# Patient Record
Sex: MALE | Race: WHITE | NOT HISPANIC OR LATINO | URBAN - METROPOLITAN AREA
[De-identification: names, ages, dates, MRNs, and addresses within clinical notes are randomized per-mention and may not be internally consistent; named-entity substitution may affect disease eponyms.]

---

## 2017-03-28 ENCOUNTER — OFFICE (AMBULATORY)
Dept: URBAN - METROPOLITAN AREA CLINIC 64 | Facility: CLINIC | Age: 82
End: 2017-03-28

## 2017-03-28 VITALS
WEIGHT: 146 LBS | DIASTOLIC BLOOD PRESSURE: 71 MMHG | HEART RATE: 80 BPM | HEIGHT: 72 IN | SYSTOLIC BLOOD PRESSURE: 138 MMHG

## 2017-03-28 DIAGNOSIS — K21.9 GASTRO-ESOPHAGEAL REFLUX DISEASE WITHOUT ESOPHAGITIS: ICD-10-CM

## 2017-03-28 DIAGNOSIS — R63.4 ABNORMAL WEIGHT LOSS: ICD-10-CM

## 2017-03-28 DIAGNOSIS — R19.7 DIARRHEA, UNSPECIFIED: ICD-10-CM

## 2017-03-28 PROCEDURE — 99214 OFFICE O/P EST MOD 30 MIN: CPT

## 2017-03-31 ENCOUNTER — HOSPITAL ENCOUNTER (OUTPATIENT)
Dept: CT IMAGING | Facility: HOSPITAL | Age: 82
Discharge: HOME | End: 2017-03-31
Attending: INTERNAL MEDICINE | Admitting: INTERNAL MEDICINE

## 2017-03-31 LAB — CREAT BLDA-MCNC: 1.3 MG/DL (ref 0.6–1.3)

## 2017-04-26 ENCOUNTER — ON CAMPUS - OUTPATIENT (AMBULATORY)
Dept: URBAN - METROPOLITAN AREA HOSPITAL 2 | Facility: HOSPITAL | Age: 82
End: 2017-04-26
Payer: MEDICARE

## 2017-04-26 ENCOUNTER — OFFICE (AMBULATORY)
Dept: URBAN - METROPOLITAN AREA CLINIC 64 | Facility: CLINIC | Age: 82
End: 2017-04-26
Payer: MEDICARE

## 2017-04-26 ENCOUNTER — HOSPITAL ENCOUNTER (OUTPATIENT)
Dept: OTHER | Facility: HOSPITAL | Age: 82
Setting detail: SPECIMEN
Discharge: HOME | End: 2017-04-26
Attending: INTERNAL MEDICINE | Admitting: INTERNAL MEDICINE

## 2017-04-26 VITALS
TEMPERATURE: 97.6 F | RESPIRATION RATE: 16 BRPM | SYSTOLIC BLOOD PRESSURE: 81 MMHG | DIASTOLIC BLOOD PRESSURE: 49 MMHG | DIASTOLIC BLOOD PRESSURE: 45 MMHG | HEART RATE: 60 BPM | SYSTOLIC BLOOD PRESSURE: 86 MMHG | DIASTOLIC BLOOD PRESSURE: 70 MMHG | WEIGHT: 139 LBS | DIASTOLIC BLOOD PRESSURE: 103 MMHG | DIASTOLIC BLOOD PRESSURE: 48 MMHG | HEART RATE: 65 BPM | SYSTOLIC BLOOD PRESSURE: 112 MMHG | OXYGEN SATURATION: 100 % | HEART RATE: 62 BPM | HEIGHT: 72 IN | HEART RATE: 61 BPM | OXYGEN SATURATION: 98 % | DIASTOLIC BLOOD PRESSURE: 64 MMHG | HEART RATE: 63 BPM | SYSTOLIC BLOOD PRESSURE: 138 MMHG | OXYGEN SATURATION: 97 % | OXYGEN SATURATION: 99 % | SYSTOLIC BLOOD PRESSURE: 107 MMHG | DIASTOLIC BLOOD PRESSURE: 63 MMHG | SYSTOLIC BLOOD PRESSURE: 121 MMHG

## 2017-04-26 DIAGNOSIS — R19.7 DIARRHEA, UNSPECIFIED: ICD-10-CM

## 2017-04-26 DIAGNOSIS — K63.5 POLYP OF COLON: ICD-10-CM

## 2017-04-26 DIAGNOSIS — D12.5 BENIGN NEOPLASM OF SIGMOID COLON: ICD-10-CM

## 2017-04-26 DIAGNOSIS — K31.819 ANGIODYSPLASIA OF STOMACH AND DUODENUM WITHOUT BLEEDING: ICD-10-CM

## 2017-04-26 DIAGNOSIS — K21.9 GASTRO-ESOPHAGEAL REFLUX DISEASE WITHOUT ESOPHAGITIS: ICD-10-CM

## 2017-04-26 LAB
GI HISTOLOGY: A. UNSPECIFIED: (no result)
GI HISTOLOGY: PDF REPORT: (no result)

## 2017-04-26 PROCEDURE — 43235 EGD DIAGNOSTIC BRUSH WASH: CPT

## 2017-04-26 PROCEDURE — 88305 TISSUE EXAM BY PATHOLOGIST: CPT | Mod: 26

## 2017-04-26 PROCEDURE — 45385 COLONOSCOPY W/LESION REMOVAL: CPT

## 2017-04-26 RX ORDER — CYPROHEPTADINE HYDROCHLORIDE 4 MG/1
4 TABLET ORAL
Qty: 30 | Refills: 1 | Status: COMPLETED
Start: 2017-04-26 | End: 2020-07-22

## 2017-04-26 RX ADMIN — PROPOFOL: 10 INJECTION, EMULSION INTRAVENOUS at 14:44

## 2017-12-15 ENCOUNTER — HOSPITAL ENCOUNTER (OUTPATIENT)
Dept: ORTHOPEDIC SURGERY | Facility: CLINIC | Age: 82
Discharge: HOME | End: 2017-12-15
Attending: ORTHOPAEDIC SURGERY | Admitting: ORTHOPAEDIC SURGERY

## 2018-04-19 ENCOUNTER — HOSPITAL ENCOUNTER (OUTPATIENT)
Dept: ORTHOPEDIC SURGERY | Facility: CLINIC | Age: 83
Discharge: HOME | End: 2018-04-19
Attending: PHYSICIAN ASSISTANT | Admitting: PHYSICIAN ASSISTANT

## 2018-04-20 ENCOUNTER — HOSPITAL ENCOUNTER (OUTPATIENT)
Dept: HOME HEALTH SERVICES | Facility: HOME HEALTHCARE | Age: 83
Setting detail: SPECIMEN
Discharge: HOME | End: 2018-04-20
Attending: INTERNAL MEDICINE | Admitting: INTERNAL MEDICINE

## 2018-04-20 LAB
ALBUMIN SERPL-MCNC: 3.3 G/DL (ref 3.5–4.8)
ALBUMIN/GLOB SERPL: 1.2 {RATIO} (ref 1–1.7)
ALP SERPL-CCNC: 124 IU/L (ref 32–91)
ALT SERPL-CCNC: 31 IU/L (ref 17–63)
ANION GAP SERPL CALC-SCNC: 11.5 MMOL/L (ref 10–20)
AST SERPL-CCNC: 26 IU/L (ref 15–41)
BASOPHILS # BLD AUTO: 0 10*3/UL (ref 0–0.2)
BASOPHILS NFR BLD AUTO: 0 % (ref 0–2)
BILIRUB SERPL-MCNC: 0.5 MG/DL (ref 0.3–1.2)
BUN SERPL-MCNC: 29 MG/DL (ref 8–20)
BUN/CREAT SERPL: 22.3 (ref 6.2–20.3)
CALCIUM SERPL-MCNC: 9.3 MG/DL (ref 8.9–10.3)
CHLORIDE SERPL-SCNC: 103 MMOL/L (ref 101–111)
CONV CO2: 25 MMOL/L (ref 22–32)
CONV TOTAL PROTEIN: 6 G/DL (ref 6.1–7.9)
CREAT UR-MCNC: 1.3 MG/DL (ref 0.7–1.2)
DIFFERENTIAL METHOD BLD: (no result)
EOSINOPHIL # BLD AUTO: 0.2 10*3/UL (ref 0–0.3)
EOSINOPHIL # BLD AUTO: 2 % (ref 0–3)
ERYTHROCYTE [DISTWIDTH] IN BLOOD BY AUTOMATED COUNT: 17.4 % (ref 11.5–14.5)
GLOBULIN UR ELPH-MCNC: 2.7 G/DL (ref 2.5–3.8)
GLUCOSE SERPL-MCNC: 124 MG/DL (ref 65–99)
HCT VFR BLD AUTO: 30.7 % (ref 40–54)
HGB BLD-MCNC: 10.3 G/DL (ref 14–18)
LYMPHOCYTES # BLD AUTO: 1.2 10*3/UL (ref 0.8–4.8)
LYMPHOCYTES NFR BLD AUTO: 11 % (ref 18–42)
MCH RBC QN AUTO: 32.7 PG (ref 26–32)
MCHC RBC AUTO-ENTMCNC: 33.6 G/DL (ref 32–36)
MCV RBC AUTO: 97.4 FL (ref 80–94)
MONOCYTES # BLD AUTO: 0.9 10*3/UL (ref 0.1–1.3)
MONOCYTES NFR BLD AUTO: 9 % (ref 2–11)
NEUTROPHILS # BLD AUTO: 8.5 10*3/UL (ref 2.3–8.6)
NEUTROPHILS NFR BLD AUTO: 78 % (ref 50–75)
NRBC BLD AUTO-RTO: 0 /100{WBCS}
NRBC/RBC NFR BLD MANUAL: 0 10*3/UL
PLATELET # BLD AUTO: 570 10*3/UL (ref 150–450)
PMV BLD AUTO: 7.8 FL (ref 7.4–10.4)
POTASSIUM SERPL-SCNC: 4.5 MMOL/L (ref 3.6–5.1)
RBC # BLD AUTO: 3.15 10*6/UL (ref 4.6–6)
SODIUM SERPL-SCNC: 135 MMOL/L (ref 136–144)
WBC # BLD AUTO: 10.8 10*3/UL (ref 4.5–11.5)

## 2018-07-31 ENCOUNTER — HOSPITAL ENCOUNTER (OUTPATIENT)
Dept: LAB | Facility: HOSPITAL | Age: 83
Discharge: HOME | End: 2018-07-31
Attending: INTERNAL MEDICINE | Admitting: INTERNAL MEDICINE

## 2018-07-31 LAB
ALBUMIN SERPL-MCNC: 3.6 G/DL (ref 3.5–4.8)
ALBUMIN/GLOB SERPL: 1.4 {RATIO} (ref 1–1.7)
ALP SERPL-CCNC: 73 IU/L (ref 32–91)
ALT SERPL-CCNC: 18 IU/L (ref 17–63)
ANION GAP SERPL CALC-SCNC: 10.4 MMOL/L (ref 10–20)
APTT BLD: 28.8 SEC (ref 24–31)
AST SERPL-CCNC: 20 IU/L (ref 15–41)
BASOPHILS # BLD AUTO: 0 10*3/UL (ref 0–0.2)
BASOPHILS NFR BLD AUTO: 0 % (ref 0–2)
BILIRUB SERPL-MCNC: 0.5 MG/DL (ref 0.3–1.2)
BUN SERPL-MCNC: 16 MG/DL (ref 8–20)
BUN/CREAT SERPL: 12.3 (ref 6.2–20.3)
CALCIUM SERPL-MCNC: 9.3 MG/DL (ref 8.9–10.3)
CHLORIDE SERPL-SCNC: 107 MMOL/L (ref 101–111)
CONV CO2: 29 MMOL/L (ref 22–32)
CONV TOTAL PROTEIN: 6.2 G/DL (ref 6.1–7.9)
CREAT UR-MCNC: 1.3 MG/DL (ref 0.7–1.2)
DIFFERENTIAL METHOD BLD: (no result)
EOSINOPHIL # BLD AUTO: 0.2 10*3/UL (ref 0–0.3)
EOSINOPHIL # BLD AUTO: 3 % (ref 0–3)
ERYTHROCYTE [DISTWIDTH] IN BLOOD BY AUTOMATED COUNT: 15.1 % (ref 11.5–14.5)
GLOBULIN UR ELPH-MCNC: 2.6 G/DL (ref 2.5–3.8)
GLUCOSE SERPL-MCNC: 119 MG/DL (ref 65–99)
HCT VFR BLD AUTO: 32.5 % (ref 40–54)
HGB BLD-MCNC: 10.8 G/DL (ref 14–18)
INR PPP: 1.6 (ref 2–3)
LYMPHOCYTES # BLD AUTO: 1.4 10*3/UL (ref 0.8–4.8)
LYMPHOCYTES NFR BLD AUTO: 20 % (ref 18–42)
MAGNESIUM SERPL-MCNC: 1.9 MG/DL (ref 1.8–2.5)
MCH RBC QN AUTO: 34.1 PG (ref 26–32)
MCHC RBC AUTO-ENTMCNC: 33.1 G/DL (ref 32–36)
MCV RBC AUTO: 102.8 FL (ref 80–94)
MONOCYTES # BLD AUTO: 0.7 10*3/UL (ref 0.1–1.3)
MONOCYTES NFR BLD AUTO: 10 % (ref 2–11)
NEUTROPHILS # BLD AUTO: 4.7 10*3/UL (ref 2.3–8.6)
NEUTROPHILS NFR BLD AUTO: 67 % (ref 50–75)
NRBC BLD AUTO-RTO: 0 /100{WBCS}
NRBC/RBC NFR BLD MANUAL: 0 10*3/UL
PLATELET # BLD AUTO: 232 10*3/UL (ref 150–450)
PMV BLD AUTO: 7.8 FL (ref 7.4–10.4)
POTASSIUM SERPL-SCNC: 4.4 MMOL/L (ref 3.6–5.1)
PROTHROMBIN TIME: 16.7 SEC (ref 19.4–28.5)
RBC # BLD AUTO: 3.16 10*6/UL (ref 4.6–6)
SODIUM SERPL-SCNC: 142 MMOL/L (ref 136–144)
WBC # BLD AUTO: 7 10*3/UL (ref 4.5–11.5)

## 2019-07-19 ENCOUNTER — ANTICOAGULATION VISIT (OUTPATIENT)
Dept: CARDIOLOGY | Facility: CLINIC | Age: 84
End: 2019-07-19

## 2019-07-19 DIAGNOSIS — I48.91 ATRIAL FIBRILLATION, UNSPECIFIED TYPE (HCC): Primary | ICD-10-CM

## 2019-07-19 LAB — INR PPP: 2.2 (ref 0.9–1.1)

## 2019-07-19 PROCEDURE — 36416 COLLJ CAPILLARY BLOOD SPEC: CPT | Performed by: INTERNAL MEDICINE

## 2019-07-19 PROCEDURE — 85610 PROTHROMBIN TIME: CPT | Performed by: INTERNAL MEDICINE

## 2019-07-19 RX ORDER — CLOPIDOGREL BISULFATE 75 MG/1
TABLET ORAL
Qty: 90 TABLET | Refills: 3 | Status: SHIPPED | OUTPATIENT
Start: 2019-07-19 | End: 2022-04-20 | Stop reason: SDUPTHER

## 2019-08-16 ENCOUNTER — ANTICOAGULATION VISIT (OUTPATIENT)
Dept: CARDIOLOGY | Facility: CLINIC | Age: 84
End: 2019-08-16

## 2019-08-16 DIAGNOSIS — I48.91 ATRIAL FIBRILLATION, UNSPECIFIED TYPE (HCC): Primary | ICD-10-CM

## 2019-08-16 LAB — INR PPP: 3 (ref 0.9–1.1)

## 2019-08-16 PROCEDURE — 36416 COLLJ CAPILLARY BLOOD SPEC: CPT | Performed by: INTERNAL MEDICINE

## 2019-08-16 PROCEDURE — 85610 PROTHROMBIN TIME: CPT | Performed by: INTERNAL MEDICINE

## 2019-08-27 ENCOUNTER — CLINICAL SUPPORT NO REQUIREMENTS (OUTPATIENT)
Dept: CARDIOLOGY | Facility: CLINIC | Age: 84
End: 2019-08-27

## 2019-08-27 DIAGNOSIS — I49.5 SICK SINUS SYNDROME (HCC): Primary | ICD-10-CM

## 2019-08-27 DIAGNOSIS — Z95.0 PRESENCE OF CARDIAC PACEMAKER: ICD-10-CM

## 2019-08-27 PROBLEM — I48.92 ATRIAL FLUTTER: Status: ACTIVE | Noted: 2019-08-27

## 2019-08-27 PROCEDURE — 93294 REM INTERROG EVL PM/LDLS PM: CPT | Performed by: INTERNAL MEDICINE

## 2019-08-27 PROCEDURE — 93296 REM INTERROG EVL PM/IDS: CPT | Performed by: INTERNAL MEDICINE

## 2019-09-20 ENCOUNTER — ANTICOAGULATION VISIT (OUTPATIENT)
Dept: CARDIOLOGY | Facility: CLINIC | Age: 84
End: 2019-09-20

## 2019-09-20 DIAGNOSIS — I48.91 ATRIAL FIBRILLATION, UNSPECIFIED TYPE (HCC): Primary | ICD-10-CM

## 2019-09-20 LAB — INR PPP: 1.4 (ref 0.9–1.1)

## 2019-09-20 PROCEDURE — 36416 COLLJ CAPILLARY BLOOD SPEC: CPT | Performed by: INTERNAL MEDICINE

## 2019-09-20 PROCEDURE — 85610 PROTHROMBIN TIME: CPT | Performed by: INTERNAL MEDICINE

## 2019-10-11 ENCOUNTER — ANTICOAGULATION VISIT (OUTPATIENT)
Dept: CARDIOLOGY | Facility: CLINIC | Age: 84
End: 2019-10-11

## 2019-10-11 DIAGNOSIS — I48.91 ATRIAL FIBRILLATION, UNSPECIFIED TYPE (HCC): Primary | ICD-10-CM

## 2019-10-11 LAB — INR PPP: 2.9 (ref 0.9–1.1)

## 2019-10-11 PROCEDURE — 85610 PROTHROMBIN TIME: CPT | Performed by: INTERNAL MEDICINE

## 2019-10-11 PROCEDURE — 36416 COLLJ CAPILLARY BLOOD SPEC: CPT | Performed by: INTERNAL MEDICINE

## 2019-11-19 ENCOUNTER — OFFICE VISIT (OUTPATIENT)
Dept: CARDIOLOGY | Facility: CLINIC | Age: 84
End: 2019-11-19

## 2019-11-19 ENCOUNTER — ANTICOAGULATION VISIT (OUTPATIENT)
Dept: CARDIOLOGY | Facility: CLINIC | Age: 84
End: 2019-11-19

## 2019-11-19 VITALS
BODY MASS INDEX: 20.02 KG/M2 | WEIGHT: 147.8 LBS | DIASTOLIC BLOOD PRESSURE: 77 MMHG | HEART RATE: 83 BPM | OXYGEN SATURATION: 98 % | SYSTOLIC BLOOD PRESSURE: 169 MMHG | HEIGHT: 72 IN

## 2019-11-19 DIAGNOSIS — I48.91 ATRIAL FIBRILLATION, UNSPECIFIED TYPE (HCC): Primary | ICD-10-CM

## 2019-11-19 DIAGNOSIS — I25.810 CORONARY ARTERY DISEASE INVOLVING CORONARY BYPASS GRAFT OF NATIVE HEART WITHOUT ANGINA PECTORIS: Primary | ICD-10-CM

## 2019-11-19 DIAGNOSIS — I49.5 SICK SINUS SYNDROME (HCC): ICD-10-CM

## 2019-11-19 DIAGNOSIS — R00.2 PALPITATIONS: ICD-10-CM

## 2019-11-19 DIAGNOSIS — Z95.0 PRESENCE OF CARDIAC PACEMAKER: ICD-10-CM

## 2019-11-19 DIAGNOSIS — I48.0 PAROXYSMAL ATRIAL FIBRILLATION (HCC): ICD-10-CM

## 2019-11-19 DIAGNOSIS — I10 ESSENTIAL HYPERTENSION: ICD-10-CM

## 2019-11-19 LAB — INR PPP: 2.5 (ref 0.9–1.1)

## 2019-11-19 PROCEDURE — 36416 COLLJ CAPILLARY BLOOD SPEC: CPT | Performed by: INTERNAL MEDICINE

## 2019-11-19 PROCEDURE — 93000 ELECTROCARDIOGRAM COMPLETE: CPT | Performed by: INTERNAL MEDICINE

## 2019-11-19 PROCEDURE — 85610 PROTHROMBIN TIME: CPT | Performed by: INTERNAL MEDICINE

## 2019-11-19 RX ORDER — WARFARIN SODIUM 5 MG/1
5 TABLET ORAL
COMMUNITY
End: 2019-12-03 | Stop reason: SDUPTHER

## 2019-11-19 RX ORDER — NITROGLYCERIN 0.4 MG/1
TABLET SUBLINGUAL
Status: ON HOLD | COMMUNITY
Start: 2015-10-01 | End: 2021-11-30

## 2019-11-19 RX ORDER — ROSUVASTATIN CALCIUM 20 MG/1
20 TABLET, COATED ORAL NIGHTLY
COMMUNITY

## 2019-11-19 RX ORDER — ASCORBIC ACID 500 MG
TABLET ORAL
Status: ON HOLD | COMMUNITY
Start: 2012-05-14 | End: 2021-11-30

## 2019-11-19 RX ORDER — FAMOTIDINE 20 MG/1
TABLET, FILM COATED ORAL EVERY 24 HOURS
COMMUNITY
Start: 2018-06-29 | End: 2020-04-20

## 2019-11-19 RX ORDER — FLUTICASONE PROPIONATE 50 MCG
SPRAY, SUSPENSION (ML) NASAL
COMMUNITY
Start: 2018-03-02 | End: 2020-04-20

## 2019-11-19 RX ORDER — PANTOPRAZOLE SODIUM 40 MG/1
40 TABLET, DELAYED RELEASE ORAL NIGHTLY
COMMUNITY
Start: 2013-09-06

## 2019-11-19 NOTE — PROGRESS NOTES
"Visit Type:  Follow-up Visit  Primary Care Provider:  Dr. Cano     Chief Complaint:  Last OV /Dr. Islas Dec. 2017: .     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        Mr. Junior Ganga mccartney is a very pleasant 84 years old anxious gentleman with history of chronic ischemic heart disease status post previous CABG and subsequent PCI stenting.  He has had  history of atrial fibrillation  and  is on long-term anticoagulation   therapy with warfarin.       Patient underwent AF ablation and  has been feeling much better without any significant symptoms-he does have easy bruisability and takes clopidogrel and warfarin.   Patient had prior AV roc reentrant tachycardia ablation.  He is intolerant to numerous medications including beta-blocker therapy.  He is currently on warfarin to prevent cardioembolic episodes.   chads Vasc score--4.  He is status post permanent pacemaker implant and will get his pacemaker checked in a couple of weeks.    He offers no complaints of chest pain dyspnea or palpitations..  /77   Pulse 83 Comment: Sinus  Ht 182.9 cm (72\")   Wt 67 kg (147 lb 12.8 oz)   SpO2 98%   BMI 20.05 kg/m²        Blood pressure recheck showed that it did come down very nicely and was four 142/72.    Indication for EKG: Paroxysmal atrial fibrillation, sick sinus syndrome, history of PSVT    Normal sinus rhythm with a rate of 83 bpm.  Early transition.  LVH.  Poor R wave progression V2 to V3 probably due to lead misplacement.  EKG is essentially unchanged as compared to previous EKG of 3/24/2019.  IL interval 162 ms QRS duration 93 ms  ms and QRS axis 56    Assessment/plan:  1- CAD,   Status post CABG.  Status post PCI stenting.  No anginal symptoms.      2- recurrent " and  paroxysmal atrial fibrillation -- post ablation doing well very rare palpitations short-lived.      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     Thanks very much for allowing us to participate in the care of your patients          Problems: Active problems were reviewed with the patient during this visit.  Medications: Medications were reviewed with the patient during this visit.  Allergies: Allergies were reviewed with the patient during this visit.           Past Medical History:     Reviewed history from 01/26/2018 and no changes required:        Coronary artery disease: S/P CABG and PCI         Hyperlipidemia        Myocardial infarction, anterior        Hypertension        Gastroesophageal reflux disease        Dubose's esophagus        Diverticulitis        Atrial Fibrillation: new onset 12-4-15        SSS, Toby, Tachy : Pacemaker implantation / [employer]         Atrial Fibrillation, paroxysmal         pneumonia        AVNRT ablation--1/2018     Past Surgical History:     Reviewed history from 04/19/2018 and no changes required:        CABG x 4, 8-15-01        PCI/stent, LCX,  2-25-07        Hernia repair        Left rotator cuff repair        9 inches of colon removed        Colonoscopy        Cardiac Cath: 2001:2003;  2007: 2010:         [facility] 12-15-15 / unsuccessful attempt PCI of obtuse marginal branch of circumflex. Represents failed 2nd attempt of PCI of this vessel.         Herniorrhaphy Left Side        [facility] : 8-5-16- [employer] Pacemaker Implantation        SVT ablation 1/2018 [facility]        Left ATHR 4/4/18 Dr. Sarah BOCANEGRA: prostate 3-31-19     Active Medications (reviewed today):  EQ ACID REDUCER MAX ST 20 MG ORAL TABLET  "(FAMOTIDINE) 1 tab daily.  FLOMAX 0.4 MG ORAL CAPSULE (TAMSULOSIN HCL) 1 tablet daily as needed.  FLUTICASONE PROPIONATE 50 MCG/ACT NASAL SUSPENSION (FLUTICASONE PROPIONATE)   WARFARIN SODIUM 5 MG TABLET (WARFARIN SODIUM) Take one tablet daily or as directed  NITROSTAT 0.4 MG SUBLINGUAL TABLET SUBLINGUAL (NITROGLYCERIN) use as directed PRN  VITAMIN D-3 5000 UNIT ORAL TABS (CHOLECALCIFEROL) Take 1 tablet by mouth daily  CLOPIDOGREL 75 MG TABLET (CLOPIDOGREL BISULFATE) TAKE 1 TABLET EVERY DAY  VITAMIN C 500 MG ORAL TABLET (ASCORBIC ACID) Take one by mouth daily  PANTOPRAZOLE SODIUM 40 MG ORAL TABLET DELAYED RELEASE (PANTOPRAZOLE SODIUM) p.o.  qd  CRESTOR 20 MG ORAL TABLET (ROSUVASTATIN CALCIUM) Take 1/2  tablet by mouth daily at bedtime     Current Allergies (reviewed today):  * VALSARTAN (Critical)  CODEINE (Moderate)  LIPITOR (Moderate)     Family History Summary:      Reviewed history Last on 01/26/2018 and no changes required:05/10/2019        General Comments - FH:  Noncontributory for heart disease  Father had \"leaky heart valve\" , but lived to be age 81.   FH Other Cancer: Mother had stomach cancer         Social History:     Reviewed history from 06/17/2014 and no changes required:        Retired                Children 2 boys           Risk Factors:      Smoked Tobacco Use:  Current every day smoker     Pipes:  Yes  Passive smoke exposure:  no  Drug use:  no  HIV high-risk behavior:  no  Caffeine use:  2 drinks per day  Alcohol use:  no  Exercise:  no  Seatbelt use:  100 %  Sun Exposure:  occasionally     Family History Risk Factors:     Family History of MI in females < 65 years old:  no     Family History of MI in males < 55 years old:  no           Review of Systems   General: denies fevers, chills, sweats, anorexia, fatigue, malaise, weight loss  Eyes: denies blurring, diplopia, irritation, discharge, vision loss, eye pain, photophobia  Cardiovascular:  history of coronary artery disease previous " CABG status post PCI stenting.  Hypertension.  Cardiac dysrhythmias atrial fibrillation and supraventricular tachycardia status post ablation for both arrhythmia is.  Permanent pacemaker implant  Respiratory: Denies cough, dyspnea, excessive sputum, hemoptysis, wheezing  Genitourinary:  history of prostate problems  Musculoskeletal: denies back pain, joint pain, joint swelling, muscle cramps, muscle weakness, stiffness, arthritis  Skin: denies rash, itching, dryness, suspicious lesions  Neurologic: denies transient paralysis, weakness, paresthesias, seizures, syncope, tremors, vertigo  Psychiatric: denies depression, anxiety, memory loss, mental disturbance, suicidal ideation, hallucinations, paranoia        Physical Exam     General:      well developed, well nourished, in no acute distress.    Neck:      no masses, thyromegaly, or abnormal cervical nodes.   no JVD. No carotid bruit  Lungs:      clear bilaterally to auscultation.    Heart:      non-displaced PMI, chest non-tender; regular rate and rhythm, S1, S2 without murmurs, rubs, or gallops  Pulses:      pulses normal in all 4 extremities.    Extremities:       no edema              Home

## 2019-12-02 ENCOUNTER — OFFICE VISIT (OUTPATIENT)
Dept: CARDIOLOGY | Facility: CLINIC | Age: 84
End: 2019-12-02

## 2019-12-02 ENCOUNTER — CLINICAL SUPPORT NO REQUIREMENTS (OUTPATIENT)
Dept: CARDIOLOGY | Facility: CLINIC | Age: 84
End: 2019-12-02

## 2019-12-02 VITALS
SYSTOLIC BLOOD PRESSURE: 150 MMHG | BODY MASS INDEX: 20.07 KG/M2 | WEIGHT: 148 LBS | DIASTOLIC BLOOD PRESSURE: 69 MMHG | HEART RATE: 76 BPM

## 2019-12-02 DIAGNOSIS — I49.5 SICK SINUS SYNDROME (HCC): Primary | ICD-10-CM

## 2019-12-02 DIAGNOSIS — I48.0 PAROXYSMAL ATRIAL FIBRILLATION (HCC): ICD-10-CM

## 2019-12-02 DIAGNOSIS — I49.5 SICK SINUS SYNDROME (HCC): ICD-10-CM

## 2019-12-02 DIAGNOSIS — I10 ESSENTIAL HYPERTENSION: ICD-10-CM

## 2019-12-02 DIAGNOSIS — Z95.0 PRESENCE OF CARDIAC PACEMAKER: Primary | ICD-10-CM

## 2019-12-02 PROCEDURE — 99213 OFFICE O/P EST LOW 20 MIN: CPT | Performed by: INTERNAL MEDICINE

## 2019-12-02 NOTE — PROGRESS NOTES
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 Junior Schuler  is a very pleasant 82 years old anxious gentleman with history of chronic ischemic heart disease status post previous CABG and subsequent PCI stenting.  He has had  history of atrial fibrillation .  He is on long-term anticoagulation therapy as well.  He is currently taking warfarin to prevent cardioembolic episodes.   patient continues to have rapid intermittent atrial arrhythmias associated with fatigue and tiredness and patient is intolerant to sotalol and started on Amiodarone which is unable to tolerate--Patient underwent AF ablation few months ago and comes in for follow-up and  is feeling much better without any significant symptoms--Patient has noticed diffuse subcutaneous bleeding with ongoing intake of warfarin    patient had prior AV roc reentrant tachycardia ablation   chads Vasc score--4  HASBLED--3     assessment plan    recurrent and  paroxysmal atrial fibrillation -- post ablation doing well  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-- post ablation with complete resolution of symptoms   dual-chamber pacemaker in situ with normal function--no atrial fibrillation on interrogation of his pacemaker   coronary artery disease    hypertension  Medications reviewed and follow-up after few months and appropriate pacemaker function noted        Vital Signs: Blood pressure 150/69 pulse rate is 76 patient is afebrile respiration 12 times a minute    Current Allergies (reviewed today):  * VALSARTAN (Critical)  CODEINE (Moderate)  LIPITOR (Moderate)      Past Medical History:     Reviewed history from 01/26/2018 and no changes required:        Coronary artery disease: S/P CABG and PCI         Hyperlipidemia        Myocardial infarction, anterior         Hypertension        Gastroesophageal reflux disease        Dubose's esophagus        Diverticulitis        Atrial Fibrillation: new onset 12-4-15        SSS, Toby, Tachy : Pacemaker implantation / [employer]         Atrial Fibrillation, paroxysmal         pneumonia        AVNRT ablation--1/2018    Past Surgical History:     Reviewed history from 04/19/2018 and no changes required:        CABG x 4, 8-15-01        PCI/stent, LCX,  2-25-07        Hernia repair        Left rotator cuff repair        9 inches of colon removed        Colonoscopy        Cardiac Cath: 2001:2003;  2007: 2010:         [facility] 12-15-15 / unsuccessful attempt PCI of obtuse marginal branch of circumflex. Represents failed 2nd attempt of PCI of this vessel.         Herniorrhaphy Left Side        [facility] : 8-5-16- [employer] Pacemaker Implantation        SVT ablation 1/2018 [facility]        Left ATHR 4/4/18 Dr. Smith        Review of Systems   General: No fatigue or tiredness, No change in weight   Eyes: No redness  Cardiovascular: No chest pain, no palpitations  Respiratory: No shortness of breath  Gastrointestinal: No nausea or vomiting, bleeding  Genitourinary: no hematuria or dysuria  Musculoskeletal: No arthralgia or myalgia  Skin: No rash  Neurologic: No numbness, tingling, syncope  Hematologic/Lymphatic:  positive for subcutaneous bleeding      Physical Exam    General:      well developed, well nourished, in no acute distress.    Head:      normocephalic and atraumatic.    Eyes:      PERRL/EOM intact, conjunctiva and sclera clear with out nystagmus.    Neck:      no masses, thyromegaly   trachea central with normal respiratory effort and PMI is nondisplaced  Lungs:      clear bilaterally to auscultation.    Heart:      non-displaced PMI, chest non-tender; regular rate and rhythm, S1, S2 without murmurs, rubs, or gallops  Skin:       positive for diffuse subcutaneous bleeding  Psych:      alert and cooperative; normal mood and affect;  normal attention span and concentration.

## 2019-12-03 PROCEDURE — 93280 PM DEVICE PROGR EVAL DUAL: CPT | Performed by: INTERNAL MEDICINE

## 2019-12-03 RX ORDER — WARFARIN SODIUM 5 MG/1
TABLET ORAL
Qty: 90 TABLET | Refills: 0 | Status: SHIPPED | OUTPATIENT
Start: 2019-12-03 | End: 2020-01-31 | Stop reason: SDUPTHER

## 2019-12-17 ENCOUNTER — ANTICOAGULATION VISIT (OUTPATIENT)
Dept: CARDIOLOGY | Facility: CLINIC | Age: 84
End: 2019-12-17

## 2019-12-17 DIAGNOSIS — I48.91 ATRIAL FIBRILLATION, UNSPECIFIED TYPE (HCC): Primary | ICD-10-CM

## 2019-12-17 LAB — INR PPP: 3.2 (ref 0.9–1.1)

## 2019-12-17 PROCEDURE — 36416 COLLJ CAPILLARY BLOOD SPEC: CPT | Performed by: INTERNAL MEDICINE

## 2019-12-17 PROCEDURE — 85610 PROTHROMBIN TIME: CPT | Performed by: INTERNAL MEDICINE

## 2020-01-07 ENCOUNTER — ANTICOAGULATION VISIT (OUTPATIENT)
Dept: CARDIOLOGY | Facility: CLINIC | Age: 85
End: 2020-01-07

## 2020-01-07 DIAGNOSIS — I48.91 ATRIAL FIBRILLATION, UNSPECIFIED TYPE (HCC): Primary | ICD-10-CM

## 2020-01-07 LAB — INR PPP: 2.9 (ref 0.9–1.1)

## 2020-01-07 PROCEDURE — 36416 COLLJ CAPILLARY BLOOD SPEC: CPT | Performed by: INTERNAL MEDICINE

## 2020-01-07 PROCEDURE — 85610 PROTHROMBIN TIME: CPT | Performed by: INTERNAL MEDICINE

## 2020-01-28 ENCOUNTER — ANTICOAGULATION VISIT (OUTPATIENT)
Dept: CARDIOLOGY | Facility: CLINIC | Age: 85
End: 2020-01-28

## 2020-01-28 DIAGNOSIS — I48.0 PAROXYSMAL ATRIAL FIBRILLATION (HCC): Primary | ICD-10-CM

## 2020-01-28 LAB — INR PPP: 1.6 (ref 0.9–1.1)

## 2020-01-28 PROCEDURE — 36416 COLLJ CAPILLARY BLOOD SPEC: CPT | Performed by: INTERNAL MEDICINE

## 2020-01-28 PROCEDURE — 85610 PROTHROMBIN TIME: CPT | Performed by: INTERNAL MEDICINE

## 2020-01-31 RX ORDER — WARFARIN SODIUM 5 MG/1
5 TABLET ORAL DAILY
Qty: 90 TABLET | Refills: 1 | Status: SHIPPED | OUTPATIENT
Start: 2020-01-31 | End: 2020-02-11 | Stop reason: SDUPTHER

## 2020-02-11 RX ORDER — WARFARIN SODIUM 5 MG/1
TABLET ORAL
Qty: 90 TABLET | Refills: 1 | Status: SHIPPED | OUTPATIENT
Start: 2020-02-11 | End: 2020-10-19

## 2020-02-25 ENCOUNTER — ANTICOAGULATION VISIT (OUTPATIENT)
Dept: CARDIOLOGY | Facility: CLINIC | Age: 85
End: 2020-02-25

## 2020-02-25 DIAGNOSIS — I48.0 PAROXYSMAL ATRIAL FIBRILLATION (HCC): Primary | ICD-10-CM

## 2020-02-25 LAB — INR PPP: 2.1 (ref 0.9–1.1)

## 2020-02-25 PROCEDURE — 36416 COLLJ CAPILLARY BLOOD SPEC: CPT | Performed by: INTERNAL MEDICINE

## 2020-02-25 PROCEDURE — 85610 PROTHROMBIN TIME: CPT | Performed by: INTERNAL MEDICINE

## 2020-03-06 ENCOUNTER — CLINICAL SUPPORT NO REQUIREMENTS (OUTPATIENT)
Dept: CARDIOLOGY | Facility: CLINIC | Age: 85
End: 2020-03-06

## 2020-03-06 DIAGNOSIS — I49.5 SICK SINUS SYNDROME (HCC): Primary | ICD-10-CM

## 2020-03-06 PROCEDURE — 93294 REM INTERROG EVL PM/LDLS PM: CPT | Performed by: INTERNAL MEDICINE

## 2020-03-06 PROCEDURE — 93296 REM INTERROG EVL PM/IDS: CPT | Performed by: INTERNAL MEDICINE

## 2020-03-17 ENCOUNTER — ANTICOAGULATION VISIT (OUTPATIENT)
Dept: CARDIOLOGY | Facility: CLINIC | Age: 85
End: 2020-03-17

## 2020-03-17 DIAGNOSIS — I48.0 PAROXYSMAL ATRIAL FIBRILLATION (HCC): Primary | ICD-10-CM

## 2020-03-17 LAB — INR PPP: 1.8 (ref 0.9–1.1)

## 2020-03-17 PROCEDURE — 36416 COLLJ CAPILLARY BLOOD SPEC: CPT | Performed by: INTERNAL MEDICINE

## 2020-03-17 PROCEDURE — 85610 PROTHROMBIN TIME: CPT | Performed by: INTERNAL MEDICINE

## 2020-04-07 ENCOUNTER — ANTICOAGULATION VISIT (OUTPATIENT)
Dept: CARDIOLOGY | Facility: CLINIC | Age: 85
End: 2020-04-07

## 2020-04-07 DIAGNOSIS — I48.0 PAROXYSMAL ATRIAL FIBRILLATION (HCC): Primary | ICD-10-CM

## 2020-04-07 LAB — INR PPP: 2 (ref 0.9–1.1)

## 2020-04-07 PROCEDURE — 36416 COLLJ CAPILLARY BLOOD SPEC: CPT | Performed by: INTERNAL MEDICINE

## 2020-04-07 PROCEDURE — 85610 PROTHROMBIN TIME: CPT | Performed by: INTERNAL MEDICINE

## 2020-04-07 NOTE — PATIENT INSTRUCTIONS
Increase to schedule listed.  Patient thinks he only increased the first week & then continued 5 mg daily.

## 2020-04-20 ENCOUNTER — OFFICE VISIT (OUTPATIENT)
Dept: CARDIOLOGY | Facility: CLINIC | Age: 85
End: 2020-04-20

## 2020-04-20 VITALS
HEART RATE: 76 BPM | WEIGHT: 148 LBS | SYSTOLIC BLOOD PRESSURE: 146 MMHG | DIASTOLIC BLOOD PRESSURE: 63 MMHG | BODY MASS INDEX: 20.05 KG/M2 | RESPIRATION RATE: 18 BRPM | HEIGHT: 72 IN | OXYGEN SATURATION: 100 %

## 2020-04-20 DIAGNOSIS — I48.92 ATRIAL FLUTTER, UNSPECIFIED TYPE (HCC): ICD-10-CM

## 2020-04-20 DIAGNOSIS — I10 ESSENTIAL HYPERTENSION: ICD-10-CM

## 2020-04-20 DIAGNOSIS — Z95.0 PRESENCE OF CARDIAC PACEMAKER: Primary | ICD-10-CM

## 2020-04-20 DIAGNOSIS — E78.2 MIXED HYPERLIPIDEMIA: ICD-10-CM

## 2020-04-20 DIAGNOSIS — I49.5 SICK SINUS SYNDROME (HCC): ICD-10-CM

## 2020-04-20 PROCEDURE — 99214 OFFICE O/P EST MOD 30 MIN: CPT | Performed by: INTERNAL MEDICINE

## 2020-04-28 ENCOUNTER — ANTICOAGULATION VISIT (OUTPATIENT)
Dept: CARDIOLOGY | Facility: CLINIC | Age: 85
End: 2020-04-28

## 2020-04-28 DIAGNOSIS — I48.0 PAROXYSMAL ATRIAL FIBRILLATION (HCC): Primary | ICD-10-CM

## 2020-04-28 LAB — INR PPP: 2.5 (ref 0.9–1.1)

## 2020-04-28 PROCEDURE — 36416 COLLJ CAPILLARY BLOOD SPEC: CPT | Performed by: INTERNAL MEDICINE

## 2020-04-28 PROCEDURE — 85610 PROTHROMBIN TIME: CPT | Performed by: INTERNAL MEDICINE

## 2020-05-29 ENCOUNTER — ANTICOAGULATION VISIT (OUTPATIENT)
Dept: CARDIOLOGY | Facility: CLINIC | Age: 85
End: 2020-05-29

## 2020-05-29 DIAGNOSIS — I48.0 PAROXYSMAL ATRIAL FIBRILLATION (HCC): Primary | ICD-10-CM

## 2020-05-29 LAB — INR PPP: 1.9 (ref 0.9–1.1)

## 2020-05-29 PROCEDURE — 36416 COLLJ CAPILLARY BLOOD SPEC: CPT | Performed by: INTERNAL MEDICINE

## 2020-05-29 PROCEDURE — 85610 PROTHROMBIN TIME: CPT | Performed by: INTERNAL MEDICINE

## 2020-06-05 ENCOUNTER — CLINICAL SUPPORT NO REQUIREMENTS (OUTPATIENT)
Dept: CARDIOLOGY | Facility: CLINIC | Age: 85
End: 2020-06-05

## 2020-06-05 ENCOUNTER — ANTICOAGULATION VISIT (OUTPATIENT)
Dept: CARDIOLOGY | Facility: CLINIC | Age: 85
End: 2020-06-05

## 2020-06-05 ENCOUNTER — OFFICE VISIT (OUTPATIENT)
Dept: CARDIOLOGY | Facility: CLINIC | Age: 85
End: 2020-06-05

## 2020-06-05 VITALS
SYSTOLIC BLOOD PRESSURE: 141 MMHG | BODY MASS INDEX: 19.96 KG/M2 | HEART RATE: 75 BPM | OXYGEN SATURATION: 93 % | DIASTOLIC BLOOD PRESSURE: 73 MMHG | WEIGHT: 147.2 LBS

## 2020-06-05 DIAGNOSIS — I48.0 PAROXYSMAL ATRIAL FIBRILLATION (HCC): Primary | ICD-10-CM

## 2020-06-05 DIAGNOSIS — I48.92 ATRIAL FLUTTER, UNSPECIFIED TYPE (HCC): ICD-10-CM

## 2020-06-05 DIAGNOSIS — I49.5 SICK SINUS SYNDROME (HCC): Primary | ICD-10-CM

## 2020-06-05 DIAGNOSIS — I49.5 SICK SINUS SYNDROME (HCC): ICD-10-CM

## 2020-06-05 DIAGNOSIS — Z95.0 PRESENCE OF CARDIAC PACEMAKER: ICD-10-CM

## 2020-06-05 DIAGNOSIS — I10 ESSENTIAL HYPERTENSION: ICD-10-CM

## 2020-06-05 LAB — INR PPP: 2.1 (ref 0.9–1.1)

## 2020-06-05 PROCEDURE — 99214 OFFICE O/P EST MOD 30 MIN: CPT | Performed by: INTERNAL MEDICINE

## 2020-06-05 PROCEDURE — 93000 ELECTROCARDIOGRAM COMPLETE: CPT | Performed by: INTERNAL MEDICINE

## 2020-06-05 PROCEDURE — 36416 COLLJ CAPILLARY BLOOD SPEC: CPT | Performed by: INTERNAL MEDICINE

## 2020-06-05 PROCEDURE — 93294 REM INTERROG EVL PM/LDLS PM: CPT | Performed by: INTERNAL MEDICINE

## 2020-06-05 PROCEDURE — 93280 PM DEVICE PROGR EVAL DUAL: CPT | Performed by: INTERNAL MEDICINE

## 2020-06-05 PROCEDURE — 93296 REM INTERROG EVL PM/IDS: CPT | Performed by: INTERNAL MEDICINE

## 2020-06-05 PROCEDURE — 85610 PROTHROMBIN TIME: CPT | Performed by: INTERNAL MEDICINE

## 2020-06-05 NOTE — PROGRESS NOTES
In clinic device interrogation with Dr. Luque. See scanned report. No new episodes. No changes made.

## 2020-06-05 NOTE — PROGRESS NOTES
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 Junior Schuler  is a very pleasant 84 years old anxious gentleman with history of chronic ischemic heart disease status post previous CABG and subsequent PCI stenting.  He has had  history of atrial fibrillation .  He is on long-term anticoagulation therapy as well.  He is currently taking warfarin to prevent cardioembolic episodes.   patient continues to have rapid intermittent atrial arrhythmias associated with fatigue and tiredness and patient is intolerant to sotalol and started on Amiodarone which is unable to tolerate--Patient underwent AF ablation  months ago and comes in for follow-up and  is feeling much better without any significant symptoms--Patient has noticed diffuse subcutaneous bleeding with ongoing intake of warfarin    patient had prior AV roc reentrant tachycardia ablation   chads Vasc score--4  HASBLED--3  Patient is doing remarkably well and denies any chest pain or dyspnea or syncope  Occasionally notices that his blood pressure systolic is elevated in the evening time and most of the time the diastolic pressures are around 60  No syncope or palpitations since AF ablation     assessment plan    recurrent and  paroxysmal atrial fibrillation -- post ablation doing well--- no significant A. 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-- post ablation with complete resolution of symptoms   dual-chamber pacemaker in situ with normal function   coronary artery disease    hypertension  Medications reviewed and follow-up after 6 months and appropriate pacemaker function noted        Vital Signs: Blood pressure 140/73 pulse rate is 75 patient is afebrile respiration  12 times a minute  EKG shows sinus rhythm with PACs and nonspecific ST-T wave changes early transition in V2 heart rate of 77 QRS of 91 QT of 406 and no significant EKG changes compared to prior EKG and EKG indication includes atrial arrhythmias and sick sinus syndrome      Past Medical History:     Reviewed history from 01/26/2018 and no changes required:        Coronary artery disease: S/P CABG and PCI         Hyperlipidemia        Myocardial infarction, anterior        Hypertension        Gastroesophageal reflux disease        Dubose's esophagus        Diverticulitis        Atrial Fibrillation: new onset 12-4-15        SSS, Toby, Tachy : Pacemaker implantation / [employer]         Atrial Fibrillation, paroxysmal         pneumonia        AVNRT ablation--1/2018    Past Surgical History:     Reviewed history from 04/19/2018 and no changes required:        CABG x 4, 8-15-01        PCI/stent, LCX,  2-25-07        Hernia repair        Left rotator cuff repair        9 inches of colon removed        Colonoscopy        Cardiac Cath: 2001:2003;  2007: 2010:         [facility] 12-15-15 / unsuccessful attempt PCI of obtuse marginal branch of circumflex. Represents failed 2nd attempt of PCI of this vessel.         Herniorrhaphy Left Side        [facility] : 8-5-16- [employer] Pacemaker Implantation        SVT ablation 1/2018 [facility]        Left ATHR 4/4/18 Dr. Smith        Review of Systems   General: No fatigue or tiredness, No change in weight   Eyes: No redness  Cardiovascular: No chest pain, no palpitations  Respiratory: No shortness of breath  Gastrointestinal: No nausea or vomiting, bleeding  Genitourinary: no hematuria or dysuria  Musculoskeletal: No arthralgia or myalgia  Skin: No rash  Neurologic: No numbness, tingling, syncope  Hematologic/Lymphatic:  positive for subcutaneous bleeding      Physical Exam    General:      well developed, well nourished, in no acute distress.    Head:      normocephalic and atraumatic.     Eyes:      PERRL/EOM intact, conjunctiva and sclera clear with out nystagmus.    Neck:      no masses, thyromegaly   trachea central with normal respiratory effort and PMI is nondisplaced  Lungs:      clear bilaterally to auscultation.    Heart:      non-displaced PMI, chest non-tender; regular rate and rhythm, S1, S2 without murmurs, rubs, or gallops  Skin:       positive for diffuse subcutaneous bleeding  Psych:      alert and cooperative; normal mood and affect; normal attention span and concentration.      Electronically signed by Froilan Luque MD, 06/05/20, 9:25 AM.

## 2020-06-30 ENCOUNTER — ANTICOAGULATION VISIT (OUTPATIENT)
Dept: CARDIOLOGY | Facility: CLINIC | Age: 85
End: 2020-06-30

## 2020-06-30 DIAGNOSIS — I48.0 PAROXYSMAL ATRIAL FIBRILLATION (HCC): Primary | ICD-10-CM

## 2020-06-30 LAB — INR PPP: 2.5 (ref 0.9–1.1)

## 2020-06-30 PROCEDURE — 36416 COLLJ CAPILLARY BLOOD SPEC: CPT | Performed by: INTERNAL MEDICINE

## 2020-06-30 PROCEDURE — 85610 PROTHROMBIN TIME: CPT | Performed by: INTERNAL MEDICINE

## 2020-07-22 ENCOUNTER — OFFICE (AMBULATORY)
Dept: URBAN - METROPOLITAN AREA CLINIC 64 | Facility: CLINIC | Age: 85
End: 2020-07-22

## 2020-07-22 VITALS
HEIGHT: 72 IN | WEIGHT: 144 LBS | DIASTOLIC BLOOD PRESSURE: 57 MMHG | SYSTOLIC BLOOD PRESSURE: 122 MMHG | HEART RATE: 74 BPM

## 2020-07-22 DIAGNOSIS — K30 FUNCTIONAL DYSPEPSIA: ICD-10-CM

## 2020-07-22 DIAGNOSIS — R11.0 NAUSEA: ICD-10-CM

## 2020-07-22 DIAGNOSIS — R63.0 ANOREXIA: ICD-10-CM

## 2020-07-22 DIAGNOSIS — K21.9 GASTRO-ESOPHAGEAL REFLUX DISEASE WITHOUT ESOPHAGITIS: ICD-10-CM

## 2020-07-22 DIAGNOSIS — R63.4 ABNORMAL WEIGHT LOSS: ICD-10-CM

## 2020-07-22 PROCEDURE — 99204 OFFICE O/P NEW MOD 45 MIN: CPT | Performed by: INTERNAL MEDICINE

## 2020-07-22 RX ORDER — FAMOTIDINE 40 MG/1
40 TABLET, FILM COATED ORAL
Qty: 30 | Refills: 11 | Status: ACTIVE
Start: 2020-07-22

## 2020-07-28 ENCOUNTER — ANTICOAGULATION VISIT (OUTPATIENT)
Dept: CARDIOLOGY | Facility: CLINIC | Age: 85
End: 2020-07-28

## 2020-07-28 DIAGNOSIS — I48.0 PAROXYSMAL ATRIAL FIBRILLATION (HCC): Primary | ICD-10-CM

## 2020-07-28 LAB — INR PPP: 2 (ref 0.9–1.1)

## 2020-07-28 PROCEDURE — 85610 PROTHROMBIN TIME: CPT | Performed by: INTERNAL MEDICINE

## 2020-07-28 PROCEDURE — 36416 COLLJ CAPILLARY BLOOD SPEC: CPT | Performed by: INTERNAL MEDICINE

## 2020-08-13 ENCOUNTER — OFFICE (AMBULATORY)
Dept: URBAN - METROPOLITAN AREA PATHOLOGY 4 | Facility: PATHOLOGY | Age: 85
End: 2020-08-13
Payer: COMMERCIAL

## 2020-08-13 ENCOUNTER — ON CAMPUS - OUTPATIENT (AMBULATORY)
Dept: URBAN - METROPOLITAN AREA HOSPITAL 2 | Facility: HOSPITAL | Age: 85
End: 2020-08-13
Payer: MEDICARE

## 2020-08-13 ENCOUNTER — OFFICE (AMBULATORY)
Dept: URBAN - METROPOLITAN AREA PATHOLOGY 4 | Facility: PATHOLOGY | Age: 85
End: 2020-08-13
Payer: MEDICARE

## 2020-08-13 VITALS
RESPIRATION RATE: 19 BRPM | OXYGEN SATURATION: 100 % | DIASTOLIC BLOOD PRESSURE: 81 MMHG | TEMPERATURE: 97.8 F | DIASTOLIC BLOOD PRESSURE: 71 MMHG | HEIGHT: 72 IN | OXYGEN SATURATION: 98 % | RESPIRATION RATE: 20 BRPM | SYSTOLIC BLOOD PRESSURE: 156 MMHG | HEART RATE: 82 BPM | WEIGHT: 144 LBS | OXYGEN SATURATION: 99 % | DIASTOLIC BLOOD PRESSURE: 54 MMHG | SYSTOLIC BLOOD PRESSURE: 136 MMHG | RESPIRATION RATE: 16 BRPM | DIASTOLIC BLOOD PRESSURE: 64 MMHG | HEART RATE: 65 BPM | HEART RATE: 62 BPM | HEART RATE: 61 BPM | DIASTOLIC BLOOD PRESSURE: 62 MMHG | RESPIRATION RATE: 17 BRPM | HEART RATE: 67 BPM | DIASTOLIC BLOOD PRESSURE: 77 MMHG | SYSTOLIC BLOOD PRESSURE: 154 MMHG | SYSTOLIC BLOOD PRESSURE: 110 MMHG | SYSTOLIC BLOOD PRESSURE: 119 MMHG

## 2020-08-13 DIAGNOSIS — K29.60 OTHER GASTRITIS WITHOUT BLEEDING: ICD-10-CM

## 2020-08-13 DIAGNOSIS — K29.70 GASTRITIS, UNSPECIFIED, WITHOUT BLEEDING: ICD-10-CM

## 2020-08-13 DIAGNOSIS — K21.9 GASTRO-ESOPHAGEAL REFLUX DISEASE WITHOUT ESOPHAGITIS: ICD-10-CM

## 2020-08-13 DIAGNOSIS — R19.7 DIARRHEA, UNSPECIFIED: ICD-10-CM

## 2020-08-13 DIAGNOSIS — R63.4 ABNORMAL WEIGHT LOSS: ICD-10-CM

## 2020-08-13 DIAGNOSIS — K44.9 DIAPHRAGMATIC HERNIA WITHOUT OBSTRUCTION OR GANGRENE: ICD-10-CM

## 2020-08-13 PROBLEM — K31.89 OTHER DISEASES OF STOMACH AND DUODENUM: Status: ACTIVE | Noted: 2020-08-13

## 2020-08-13 PROBLEM — K22.8 OTHER SPECIFIED DISEASES OF ESOPHAGUS: Status: ACTIVE | Noted: 2020-08-13

## 2020-08-13 LAB
GI HISTOLOGY: A. UNSPECIFIED: (no result)
GI HISTOLOGY: B. SELECT: (no result)
GI HISTOLOGY: C. UNSPECIFIED: (no result)
GI HISTOLOGY: PDF REPORT: (no result)

## 2020-08-13 PROCEDURE — 88305 TISSUE EXAM BY PATHOLOGIST: CPT | Mod: 26 | Performed by: INTERNAL MEDICINE

## 2020-08-13 PROCEDURE — 43239 EGD BIOPSY SINGLE/MULTIPLE: CPT | Performed by: INTERNAL MEDICINE

## 2020-08-20 ENCOUNTER — OFFICE (AMBULATORY)
Dept: URBAN - METROPOLITAN AREA CLINIC 64 | Facility: CLINIC | Age: 85
End: 2020-08-20

## 2020-08-20 VITALS
HEIGHT: 72 IN | SYSTOLIC BLOOD PRESSURE: 134 MMHG | DIASTOLIC BLOOD PRESSURE: 53 MMHG | HEART RATE: 87 BPM | WEIGHT: 139 LBS

## 2020-08-20 DIAGNOSIS — R14.0 ABDOMINAL DISTENSION (GASEOUS): ICD-10-CM

## 2020-08-20 DIAGNOSIS — K59.00 CONSTIPATION, UNSPECIFIED: ICD-10-CM

## 2020-08-20 DIAGNOSIS — K30 FUNCTIONAL DYSPEPSIA: ICD-10-CM

## 2020-08-20 DIAGNOSIS — G47.00 INSOMNIA, UNSPECIFIED: ICD-10-CM

## 2020-08-20 PROCEDURE — 99213 OFFICE O/P EST LOW 20 MIN: CPT | Performed by: NURSE PRACTITIONER

## 2020-08-21 ENCOUNTER — ANTICOAGULATION VISIT (OUTPATIENT)
Dept: CARDIOLOGY | Facility: CLINIC | Age: 85
End: 2020-08-21

## 2020-08-21 DIAGNOSIS — I48.0 PAROXYSMAL ATRIAL FIBRILLATION (HCC): Primary | ICD-10-CM

## 2020-08-21 LAB
INR PPP: 1.8 (ref 0.9–1.1)
INR PPP: 1.8 (ref 0.9–1.1)

## 2020-08-21 PROCEDURE — 85610 PROTHROMBIN TIME: CPT | Performed by: INTERNAL MEDICINE

## 2020-08-21 PROCEDURE — 36416 COLLJ CAPILLARY BLOOD SPEC: CPT | Performed by: INTERNAL MEDICINE

## 2020-09-11 ENCOUNTER — ANTICOAGULATION VISIT (OUTPATIENT)
Dept: CARDIOLOGY | Facility: CLINIC | Age: 85
End: 2020-09-11

## 2020-09-11 DIAGNOSIS — I48.0 PAROXYSMAL ATRIAL FIBRILLATION (HCC): Primary | ICD-10-CM

## 2020-09-11 LAB — INR PPP: 2.4 (ref 0.9–1.1)

## 2020-09-11 PROCEDURE — 85610 PROTHROMBIN TIME: CPT | Performed by: INTERNAL MEDICINE

## 2020-10-09 ENCOUNTER — ANTICOAGULATION VISIT (OUTPATIENT)
Dept: CARDIOLOGY | Facility: CLINIC | Age: 85
End: 2020-10-09

## 2020-10-09 DIAGNOSIS — I48.0 PAROXYSMAL ATRIAL FIBRILLATION (HCC): Primary | ICD-10-CM

## 2020-10-09 LAB — INR PPP: 2.2 (ref 0.9–1.1)

## 2020-10-09 PROCEDURE — 85610 PROTHROMBIN TIME: CPT | Performed by: INTERNAL MEDICINE

## 2020-10-09 PROCEDURE — 36416 COLLJ CAPILLARY BLOOD SPEC: CPT | Performed by: INTERNAL MEDICINE

## 2020-10-19 RX ORDER — WARFARIN SODIUM 5 MG/1
TABLET ORAL
Qty: 90 TABLET | Refills: 1 | Status: SHIPPED | OUTPATIENT
Start: 2020-10-19 | End: 2020-12-28

## 2020-10-20 ENCOUNTER — OFFICE VISIT (OUTPATIENT)
Dept: CARDIOLOGY | Facility: CLINIC | Age: 85
End: 2020-10-20

## 2020-10-20 VITALS
BODY MASS INDEX: 19.37 KG/M2 | HEART RATE: 73 BPM | WEIGHT: 143 LBS | SYSTOLIC BLOOD PRESSURE: 153 MMHG | DIASTOLIC BLOOD PRESSURE: 73 MMHG | OXYGEN SATURATION: 99 % | RESPIRATION RATE: 18 BRPM | HEIGHT: 72 IN

## 2020-10-20 DIAGNOSIS — Z95.0 PRESENCE OF CARDIAC PACEMAKER: ICD-10-CM

## 2020-10-20 DIAGNOSIS — I49.5 SICK SINUS SYNDROME (HCC): Primary | ICD-10-CM

## 2020-10-20 DIAGNOSIS — I48.0 PAROXYSMAL ATRIAL FIBRILLATION (HCC): ICD-10-CM

## 2020-10-20 DIAGNOSIS — I25.10 CORONARY ARTERY DISEASE INVOLVING NATIVE CORONARY ARTERY OF NATIVE HEART WITHOUT ANGINA PECTORIS: ICD-10-CM

## 2020-10-20 DIAGNOSIS — I48.92 ATRIAL FLUTTER, UNSPECIFIED TYPE (HCC): ICD-10-CM

## 2020-10-20 DIAGNOSIS — I21.9 MYOCARDIAL INFARCTION, UNSPECIFIED MI TYPE, UNSPECIFIED ARTERY (HCC): ICD-10-CM

## 2020-10-20 PROCEDURE — 93000 ELECTROCARDIOGRAM COMPLETE: CPT | Performed by: INTERNAL MEDICINE

## 2020-10-20 PROCEDURE — 99214 OFFICE O/P EST MOD 30 MIN: CPT | Performed by: INTERNAL MEDICINE

## 2020-10-20 RX ORDER — AMLODIPINE BESYLATE 2.5 MG/1
2.5 TABLET ORAL DAILY
COMMUNITY

## 2020-10-20 NOTE — PROGRESS NOTES
Cardiology Office Visit      Encounter Date:  10/20/2020    Patient ID:   Junior MONI Robles is a 84 y.o. male.    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 As you are well aware, this is a 84 y.o. male with history of chronic ischemic heart disease status post previous CABG and subsequent PCI stenting.  He has had  history of atrial fibrillation  and  is on long-term anticoagulation   therapy with warfarin.       Patient underwent AF ablation and  has been feeling much better without any significant symptoms-he does have easy bruisability and takes clopidogrel and warfarin.   Patient had prior AV roc reentrant tachycardia ablation.  He is intolerant to numerous medications including beta-blocker therapy.  He is currently on warfarin to prevent cardioembolic episodes. chads Vasc score--4.  He is status post permanent pacemaker implant       Interval History:  Patient denies any new complaints denies any symptoms of chest pain shortness of breath dizziness or syncope    Assessment & Plan    Impressions:  1- CAD,   Status post CABG.  Status post PCI stenting.  No anginal symptoms.      2- recurrent and  paroxysmal atrial fibrillation -- post ablation doing well very rare palpitations short-lived.    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-- post ablation with complete resolution of symptoms     4- dual-chamber pacemaker in situ with normal function      5-  hypertension  Under excellent control      Recommendations:  Continue current medical therapy  Continued aggressive risk factor modification  Follow-up on blood pressure and heart rate at home  PT/INR check in  "the office once a month  Recent device interrogation with normal function  Patient is tolerating anticoagulation therapy without any GI bleeding issues  Last with primary care physician office patient is advised to send a copy  we will see him in the office in 6 months for follow-up.  Thanks very much for allowing us to participate in the care of your patients    Objective:    Vitals:  Vitals:    10/20/20 0850   BP: 153/73   BP Location: Left arm   Pulse: 73   Resp: 18   SpO2: 99%   Weight: 64.9 kg (143 lb)   Height: 182.9 cm (72\")       Physical Exam:    General: Alert, cooperative, no distress, appears stated age  Head:  Normocephalic, atraumatic, mucous membranes moist  Eyes:  Conjunctiva/corneas clear, EOM's intact     Neck:  Supple,  no adenopathy;      Lungs: Clear to auscultation bilaterally, no wheezes rhonchi rales are noted  Chest wall: No tenderness  Heart::  Regular rate and rhythm, S1 and S2 normal, no murmur, rub or gallop  Abdomen: Soft, non-tender, nondistended bowel sounds active  Extremities: No cyanosis, clubbing, or edema  Pulses: 2+ and symmetric all extremities  Skin:  No rashes or lesions  Neuro/psych: A&O x3. CN II through XII are grossly intact with appropriate affect      Allergies:  Allergies   Allergen Reactions   • Valsartan Other (See Comments)     Weakness, loss of appetite    • Atorvastatin Calcium Myalgia   • Codeine Other (See Comments)     Lightheaded        Medication Review:     Current Outpatient Medications:   •  amLODIPine (NORVASC) 2.5 MG tablet, Take 2.5 mg by mouth Daily., Disp: , Rfl:   •  Cholecalciferol (VITAMIN D-1000 MAX ST) 25 MCG (1000 UT) tablet, Take 1,000 Units by mouth Daily., Disp: , Rfl:   •  clopidogrel (PLAVIX) 75 MG tablet, TAKE 1 TABLET EVERY DAY, Disp: 90 tablet, Rfl: 3  •  nitroglycerin (NITROSTAT) 0.4 MG SL tablet, NITROSTAT 0.4 MG SUBL, Disp: , Rfl:   •  pantoprazole (PROTONIX) 40 MG EC tablet, Daily., Disp: , Rfl:   •  rosuvastatin (CRESTOR) 20 MG " tablet, Take one half tablet daily, Disp: , Rfl:   •  vitamin C (ASCORBIC ACID) 500 MG tablet, VITAMIN C 500 MG TABS, Disp: , Rfl:   •  warfarin (COUMADIN) 5 MG tablet, TAKE 1 TABLET EVERY DAY, Disp: 90 tablet, Rfl: 1    Family History:  Family History   Problem Relation Age of Onset   • Stomach cancer Mother    • Heart disease Father         leaky valve       Past Medical History:  Past Medical History:   Diagnosis Date   • Atrial fibrillation (CMS/HCC) 12/04/2015    new onset   • AVNRT (AV roc re-entry tachycardia) (CMS/[facility]) 01/2018    AVNRT Ablation   • Dubose esophagus    • CAD (coronary artery disease)     S/P CABG and PCI   • Diverticulitis    • Gastroesophageal reflux disease    • Hyperlipidemia    • Hypertension    • Myocardial infarction (CMS/[facility])     anterior   • Paroxysmal atrial fibrillation (CMS/[facility])    • Pneumonia    • SSS (sick sinus syndrome) (CMS/[facility])     SSS, Toby, tachy; pacemaker implantation/ [employer]       Past surgical History:  Past Surgical History:   Procedure Laterality Date   • CARDIAC CATHETERIZATION      2001:2003; 2007:2010:[facility] 12-15-15/ unsuccessful attempt PCI of obtuse marginal branch of circumflex. represents failed 2nd attempt of PCI of this vessel.   • COLON SURGERY      9 inches on colon removed   • COLONOSCOPY     • CORONARY ANGIOPLASTY     • CORONARY ARTERY BYPASS GRAFT  08/15/2001    x4   • CORONARY STENT PLACEMENT  02/25/2007    LCX    • HERNIA REPAIR     • HERNIA REPAIR Left    • INSERT / REPLACE / REMOVE PACEMAKER  08/05/2016    [facility] [employer] pacemaker implantation   • OTHER SURGICAL HISTORY  02/25/2007    PCI/stent, LCX   • PROSTATE SURGERY  03/31/2019    REZUM: prostate   • ROTATOR CUFF REPAIR Left    • TOTAL HIP ARTHROPLASTY Left 04/04/2018    [facility] Dr. Smith   • VENTRICULAR ABLATION SURGERY  01/2018    SVT ablation [facility]       Social History:  Social History     Socioeconomic History   • Marital status:      Spouse name: Not on file   • Number  of children: Not on file   • Years of education: Not on file   • Highest education level: Not on file   Tobacco Use   • Smoking status: Current Every Day Smoker     Types: Pipe   • Smokeless tobacco: Never Used   Substance and Sexual Activity   • Alcohol use: No     Frequency: Never   • Drug use: No       Review of Systems:  The following systems were reviewed as they relate to the cardiovascular system: Constitutional, Eyes, ENT, Cardiovascular, Respiratory, Gastrointestinal, Integumentary, Neurological, Psychiatric, Hematologic, Endocrine, Musculoskeletal, and Genitourinary. The pertinent cardiovascular findings are reported above with all other cardiovascular points within those systems being negative.    Diagnostic Study Review:     Current Electrocardiogram:    ECG 12 Lead    Date/Time: 10/21/2020 12:33 PM  Performed by: Holly Spivey MD  Authorized by: Holly Spivey MD   Comparison: compared with previous ECG   Similar to previous ECG  Rhythm: sinus rhythm  Rate: normal  BPM: 74  Conduction: conduction normal  QRS axis: normal  Other findings: non-specific ST-T wave changes and poor R wave progression    Clinical impression: abnormal EKG              NOTE: The following portions of the patient's history were reviewed and updated this visit as appropriate: allergies, current medications, past family history, past medical history, past social history, past surgical history and problem list.

## 2020-11-13 ENCOUNTER — ANTICOAGULATION VISIT (OUTPATIENT)
Dept: CARDIOLOGY | Facility: CLINIC | Age: 85
End: 2020-11-13

## 2020-11-13 DIAGNOSIS — I48.0 PAROXYSMAL ATRIAL FIBRILLATION (HCC): Primary | ICD-10-CM

## 2020-11-13 LAB — INR PPP: 2.1 (ref 0.9–1.1)

## 2020-11-13 PROCEDURE — 36416 COLLJ CAPILLARY BLOOD SPEC: CPT | Performed by: INTERNAL MEDICINE

## 2020-11-13 PROCEDURE — 85610 PROTHROMBIN TIME: CPT | Performed by: INTERNAL MEDICINE

## 2020-12-11 ENCOUNTER — ANTICOAGULATION VISIT (OUTPATIENT)
Dept: CARDIOLOGY | Facility: CLINIC | Age: 85
End: 2020-12-11

## 2020-12-11 DIAGNOSIS — I48.0 PAROXYSMAL ATRIAL FIBRILLATION (HCC): Primary | ICD-10-CM

## 2020-12-11 LAB — INR PPP: 1.9 (ref 0.9–1.1)

## 2020-12-11 PROCEDURE — 36416 COLLJ CAPILLARY BLOOD SPEC: CPT | Performed by: INTERNAL MEDICINE

## 2020-12-11 PROCEDURE — 85610 PROTHROMBIN TIME: CPT | Performed by: INTERNAL MEDICINE

## 2020-12-14 ENCOUNTER — OFFICE VISIT (OUTPATIENT)
Dept: CARDIOLOGY | Facility: CLINIC | Age: 85
End: 2020-12-14

## 2020-12-14 VITALS
HEART RATE: 80 BPM | BODY MASS INDEX: 19.67 KG/M2 | WEIGHT: 145 LBS | DIASTOLIC BLOOD PRESSURE: 71 MMHG | OXYGEN SATURATION: 96 % | SYSTOLIC BLOOD PRESSURE: 144 MMHG

## 2020-12-14 DIAGNOSIS — I49.5 SICK SINUS SYNDROME (HCC): ICD-10-CM

## 2020-12-14 DIAGNOSIS — I10 ESSENTIAL HYPERTENSION: ICD-10-CM

## 2020-12-14 DIAGNOSIS — I48.0 PAROXYSMAL ATRIAL FIBRILLATION (HCC): Primary | ICD-10-CM

## 2020-12-14 DIAGNOSIS — E78.2 MIXED HYPERLIPIDEMIA: ICD-10-CM

## 2020-12-14 DIAGNOSIS — Z95.0 PRESENCE OF CARDIAC PACEMAKER: ICD-10-CM

## 2020-12-14 PROCEDURE — 99214 OFFICE O/P EST MOD 30 MIN: CPT | Performed by: INTERNAL MEDICINE

## 2020-12-14 PROCEDURE — 93000 ELECTROCARDIOGRAM COMPLETE: CPT | Performed by: INTERNAL MEDICINE

## 2020-12-14 PROCEDURE — 93280 PM DEVICE PROGR EVAL DUAL: CPT | Performed by: INTERNAL MEDICINE

## 2020-12-14 NOTE — PROGRESS NOTES
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 Junior Schuler  is a very pleasant 85 years old anxious gentleman with history of chronic ischemic heart disease status post previous CABG and subsequent PCI stenting.  He has had  history of atrial fibrillation .  He is on long-term anticoagulation therapy as well.  He is currently taking warfarin to prevent cardioembolic episodes.   patient continues to have rapid intermittent atrial arrhythmias associated with fatigue and tiredness and patient is intolerant to sotalol and started on Amiodarone which is unable to tolerate--Patient underwent AF ablation  months ago and comes in for follow-up and  is feeling much better without any significant symptoms--Patient has noticed diffuse subcutaneous bleeding with ongoing intake of warfarin    patient had prior AV roc reentrant tachycardia ablation   chads Vasc score--4  HASBLED--3  Patient is doing remarkably well and denies any chest pain or dyspnea or syncope  No syncope or palpitations since AF ablation  Remains in functional class I for his age     assessment plan    recurrent and  paroxysmal atrial fibrillation -- post ablation doing well--- no significant A. 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-- post ablation with complete resolution of symptoms   dual-chamber pacemaker in situ with normal function   coronary artery disease    hypertension  Medications reviewed and follow-up after 6 months and appropriate pacemaker function noted        Vital Signs: Blood pressure 144/71 pulse rate is 80 patient is afebrile respiration 12 times a minute    Past Medical History:     Reviewed history from 01/26/2018 and no changes  required:        Coronary artery disease: S/P CABG and PCI         Hyperlipidemia        Myocardial infarction, anterior        Hypertension        Gastroesophageal reflux disease        Dubose's esophagus        Diverticulitis        Atrial Fibrillation: new onset 12-4-15        SSS, Toby, Tachy : Pacemaker implantation / [employer]         Atrial Fibrillation, paroxysmal         pneumonia        AVNRT ablation--1/2018    Past Surgical History:     Reviewed history from 04/19/2018 and no changes required:        CABG x 4, 8-15-01        PCI/stent, LCX,  2-25-07        Hernia repair        Left rotator cuff repair        9 inches of colon removed        Colonoscopy        Cardiac Cath: 2001:2003;  2007: 2010:         [facility] 12-15-15 / unsuccessful attempt PCI of obtuse marginal branch of circumflex. Represents failed 2nd attempt of PCI of this vessel.         Herniorrhaphy Left Side        [facility] : 8-5-16- [employer] Pacemaker Implantation        SVT ablation 1/2018 [facility]        Left ATHR 4/4/18 Dr. Smith        Review of Systems   General: No fatigue or tiredness, No change in weight   Eyes: No redness  Cardiovascular: No chest pain, no palpitations  Respiratory: No shortness of breath  Gastrointestinal: No nausea or vomiting, bleeding  Genitourinary: no hematuria or dysuria  Musculoskeletal: No arthralgia or myalgia  Skin: No rash  Neurologic: No numbness, tingling, syncope  Hematologic/Lymphatic:  positive for subcutaneous bleeding      Physical Exam    General:      well developed, well nourished, in no acute distress.    Head:      normocephalic and atraumatic.    Eyes:      PERRL/EOM intact, conjunctiva and sclera clear with out nystagmus.    Neck:      no masses, thyromegaly   trachea central with normal respiratory effort and PMI is nondisplaced  Lungs:      clear bilaterally to auscultation.    Heart:       regular rate and rhythm, S1, S2 without murmurs, rubs, or gallops  Skin:       positive for  diffuse subcutaneous bleeding  Psych:      alert and cooperative; normal mood and affect; normal attention span and concentration.      Neurological exam shows no focal deficits and patient is alert and oriented x3 with normal gait with mild kyphosis          ECG 12 Lead    Date/Time: 12/14/2020 8:50 AM  Performed by: Froilan Luque MD  Authorized by: Froilan Luque MD   Comparison: compared with previous ECG   Similar to previous ECG  Rhythm: sinus rhythm  Ectopy: infrequent PVCs  Rate: normal  Conduction: conduction normal  QRS axis: normal  Other findings: non-specific ST-T wave changes and left atrial abnormality          Electronically signed by Froilan Luque MD, 12/14/20, 8:50 AM EST.

## 2020-12-28 RX ORDER — WARFARIN SODIUM 5 MG/1
TABLET ORAL
Qty: 90 TABLET | Refills: 1 | Status: SHIPPED | OUTPATIENT
Start: 2020-12-28 | End: 2021-07-12

## 2021-01-08 ENCOUNTER — ANTICOAGULATION VISIT (OUTPATIENT)
Dept: CARDIOLOGY | Facility: CLINIC | Age: 86
End: 2021-01-08

## 2021-01-08 DIAGNOSIS — I48.0 PAROXYSMAL ATRIAL FIBRILLATION (HCC): Primary | ICD-10-CM

## 2021-01-08 LAB — INR PPP: 2.1 (ref 0.9–1.1)

## 2021-01-08 PROCEDURE — 36416 COLLJ CAPILLARY BLOOD SPEC: CPT | Performed by: INTERNAL MEDICINE

## 2021-01-08 PROCEDURE — 85610 PROTHROMBIN TIME: CPT | Performed by: INTERNAL MEDICINE

## 2021-02-05 ENCOUNTER — ANTICOAGULATION VISIT (OUTPATIENT)
Dept: CARDIOLOGY | Facility: CLINIC | Age: 86
End: 2021-02-05

## 2021-02-05 DIAGNOSIS — I48.0 PAROXYSMAL ATRIAL FIBRILLATION (HCC): Primary | ICD-10-CM

## 2021-02-05 LAB — INR PPP: 1.6 (ref 0.9–1.1)

## 2021-02-05 PROCEDURE — 36416 COLLJ CAPILLARY BLOOD SPEC: CPT | Performed by: INTERNAL MEDICINE

## 2021-02-05 PROCEDURE — 85610 PROTHROMBIN TIME: CPT | Performed by: INTERNAL MEDICINE

## 2021-03-04 PROCEDURE — 93296 REM INTERROG EVL PM/IDS: CPT | Performed by: INTERNAL MEDICINE

## 2021-03-04 PROCEDURE — 93294 REM INTERROG EVL PM/LDLS PM: CPT | Performed by: INTERNAL MEDICINE

## 2021-03-05 ENCOUNTER — ANTICOAGULATION VISIT (OUTPATIENT)
Dept: CARDIOLOGY | Facility: CLINIC | Age: 86
End: 2021-03-05

## 2021-03-05 DIAGNOSIS — I48.0 PAROXYSMAL ATRIAL FIBRILLATION (HCC): Primary | ICD-10-CM

## 2021-03-05 LAB — INR PPP: 2.7 (ref 0.9–1.1)

## 2021-03-05 PROCEDURE — 36416 COLLJ CAPILLARY BLOOD SPEC: CPT | Performed by: INTERNAL MEDICINE

## 2021-03-05 PROCEDURE — 85610 PROTHROMBIN TIME: CPT | Performed by: INTERNAL MEDICINE

## 2021-04-09 ENCOUNTER — ANTICOAGULATION VISIT (OUTPATIENT)
Dept: CARDIOLOGY | Facility: CLINIC | Age: 86
End: 2021-04-09

## 2021-04-09 DIAGNOSIS — I48.0 PAROXYSMAL ATRIAL FIBRILLATION (HCC): Primary | ICD-10-CM

## 2021-04-09 LAB — INR PPP: 1.4 (ref 0.9–1.1)

## 2021-04-09 PROCEDURE — 36416 COLLJ CAPILLARY BLOOD SPEC: CPT | Performed by: INTERNAL MEDICINE

## 2021-04-09 PROCEDURE — 85610 PROTHROMBIN TIME: CPT | Performed by: INTERNAL MEDICINE

## 2021-04-20 ENCOUNTER — OFFICE VISIT (OUTPATIENT)
Dept: CARDIOLOGY | Facility: CLINIC | Age: 86
End: 2021-04-20

## 2021-04-20 VITALS
OXYGEN SATURATION: 98 % | HEART RATE: 76 BPM | RESPIRATION RATE: 18 BRPM | SYSTOLIC BLOOD PRESSURE: 130 MMHG | BODY MASS INDEX: 20.18 KG/M2 | WEIGHT: 149 LBS | DIASTOLIC BLOOD PRESSURE: 66 MMHG | HEIGHT: 72 IN

## 2021-04-20 DIAGNOSIS — I48.92 ATRIAL FLUTTER, UNSPECIFIED TYPE (HCC): ICD-10-CM

## 2021-04-20 DIAGNOSIS — I25.810 CORONARY ARTERY DISEASE INVOLVING CORONARY BYPASS GRAFT OF NATIVE HEART WITHOUT ANGINA PECTORIS: ICD-10-CM

## 2021-04-20 DIAGNOSIS — R00.2 PALPITATIONS: ICD-10-CM

## 2021-04-20 DIAGNOSIS — I48.0 PAROXYSMAL ATRIAL FIBRILLATION (HCC): Primary | ICD-10-CM

## 2021-04-20 DIAGNOSIS — I21.9 MYOCARDIAL INFARCTION, UNSPECIFIED MI TYPE, UNSPECIFIED ARTERY (HCC): ICD-10-CM

## 2021-04-20 DIAGNOSIS — I10 ESSENTIAL HYPERTENSION: ICD-10-CM

## 2021-04-20 DIAGNOSIS — I49.5 SICK SINUS SYNDROME (HCC): ICD-10-CM

## 2021-04-20 DIAGNOSIS — R94.31 ABNORMAL ELECTROCARDIOGRAM (ECG) (EKG): ICD-10-CM

## 2021-04-20 DIAGNOSIS — Z95.0 PRESENCE OF CARDIAC PACEMAKER: ICD-10-CM

## 2021-04-20 PROCEDURE — 99214 OFFICE O/P EST MOD 30 MIN: CPT | Performed by: INTERNAL MEDICINE

## 2021-04-20 PROCEDURE — 93000 ELECTROCARDIOGRAM COMPLETE: CPT | Performed by: INTERNAL MEDICINE

## 2021-04-20 NOTE — PROGRESS NOTES
Cardiology Office Visit      Encounter Date:  04/20/2021    Patient ID:   Junior MONI Robles is a 85 y.o. male.    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 As you are well aware, this is a 85 y.o. male with history of chronic ischemic heart disease status post previous CABG and subsequent PCI stenting.  He has had  history of atrial fibrillation  and  is on long-term anticoagulation   therapy with warfarin.       Patient underwent AF ablation and  has been feeling much better without any significant symptoms-he does have easy bruisability and takes clopidogrel and warfarin.   Patient had prior AV roc reentrant tachycardia ablation.  He is intolerant to numerous medications including beta-blocker therapy.  He is currently on warfarin to prevent cardioembolic episodes. chads Vasc score--4.  He is status post permanent pacemaker implant       Interval History:  Patient denies any new complaints denies any symptoms of chest pain  dizziness or syncope  Denies any new complaints except for some shortness of breath with exertion and activity  Dizziness or syncope  Assessment & Plan    Impressions:  1- CAD,   Status post CABG.  Status post PCI stenting.  No anginal symptoms.      2- recurrent and  paroxysmal atrial fibrillation -- post ablation doing well very rare palpitations short-lived.    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-- post ablation with complete resolution of symptoms     4- dual-chamber pacemaker in situ with normal function      5-  hypertension  Under excellent control  Exertional shortness of breath and dyspnea    Recommendations:  Continue current  "medical therapy  Continued aggressive risk factor modification  Schedule patient for an echocardiogram to assess the LV systolic function secondary to patient's symptoms of shortness of breath and systolic heart murmur  Schedule patient for stress Cardiolite for further stratification with a prior history of known coronary artery disease with symptoms of shortness of breath and dyspnea on exertion  Follow-up on blood pressure and heart rate at home  PT/INR check in the office once a month  Recent device interrogation with normal function  Patient is tolerating anticoagulation therapy without any GI bleeding issues  Last with primary care physician office patient is advised to send a copy  we will see him in the office in 6 months for follow-up.  Check labs including CBC CMP and lipids  Thanks very much for allowing us to participate in the care of your patients    Objective:    Vitals:  Vitals:    04/20/21 0919   BP: 130/66   BP Location: Left arm   Pulse: 76   Resp: 18   SpO2: 98%   Weight: 67.6 kg (149 lb)   Height: 182.9 cm (72\")       Physical Exam:    General: Alert, cooperative, no distress, appears stated age  Head:  Normocephalic, atraumatic, mucous membranes moist  Eyes:  Conjunctiva/corneas clear, EOM's intact     Neck:  Supple,  no adenopathy;      Lungs: Clear to auscultation bilaterally, no wheezes rhonchi rales are noted  Chest wall: No tenderness  Heart::  Regular rate and rhythm, S1 and S2 normal, 2 x 6 is assistant murmur right sternal border  Abdomen: Soft, non-tender, nondistended bowel sounds active  Extremities: No cyanosis, clubbing, or edema  Pulses: 2+ and symmetric all extremities  Skin:  No rashes or lesions  Neuro/psych: A&O x3. CN II through XII are grossly intact with appropriate affect      Allergies:  Allergies   Allergen Reactions   • Valsartan Other (See Comments)     Weakness, loss of appetite    • Atorvastatin Calcium Myalgia   • Codeine Other (See Comments)     Lightheaded  "       Medication Review:     Current Outpatient Medications:   •  amLODIPine (NORVASC) 2.5 MG tablet, Take 2.5 mg by mouth Daily., Disp: , Rfl:   •  Cholecalciferol (VITAMIN D-1000 MAX ST) 25 MCG (1000 UT) tablet, Take 1,000 Units by mouth Daily., Disp: , Rfl:   •  clopidogrel (PLAVIX) 75 MG tablet, TAKE 1 TABLET EVERY DAY, Disp: 90 tablet, Rfl: 3  •  nitroglycerin (NITROSTAT) 0.4 MG SL tablet, NITROSTAT 0.4 MG SUBL, Disp: , Rfl:   •  pantoprazole (PROTONIX) 40 MG EC tablet, Daily., Disp: , Rfl:   •  rosuvastatin (CRESTOR) 20 MG tablet, Take one half tablet daily, Disp: , Rfl:   •  vitamin C (ASCORBIC ACID) 500 MG tablet, VITAMIN C 500 MG TABS, Disp: , Rfl:   •  warfarin (COUMADIN) 5 MG tablet, TAKE ONE TABLET DAILY OR AS DIRECTED., Disp: 90 tablet, Rfl: 1    Family History:  Family History   Problem Relation Age of Onset   • Stomach cancer Mother    • Heart disease Father         leaky valve       Past Medical History:  Past Medical History:   Diagnosis Date   • Atrial fibrillation (CMS/HCC) 12/04/2015    new onset   • AVNRT (AV roc re-entry tachycardia) (CMS/HCC) 01/2018    AVNRT Ablation   • Dubose esophagus    • CAD (coronary artery disease)     S/P CABG and PCI   • Diverticulitis    • Gastroesophageal reflux disease    • Hyperlipidemia    • Hypertension    • Myocardial infarction (CMS/HCC)     anterior   • Paroxysmal atrial fibrillation (CMS/HCC)    • Pneumonia    • SSS (sick sinus syndrome) (CMS/[facility])     SSS, Toby, tachy; pacemaker implantation/ [employer]       Past surgical History:  Past Surgical History:   Procedure Laterality Date   • CARDIAC CATHETERIZATION      2001:2003; 2007:2010:[facility] 12-15-15/ unsuccessful attempt PCI of obtuse marginal branch of circumflex. represents failed 2nd attempt of PCI of this vessel.   • COLON SURGERY      9 inches on colon removed   • COLONOSCOPY     • CORONARY ANGIOPLASTY     • CORONARY ARTERY BYPASS GRAFT  08/15/2001    x4   • CORONARY STENT PLACEMENT   02/25/2007    LCX    • HERNIA REPAIR     • HERNIA REPAIR Left    • INSERT / REPLACE / REMOVE PACEMAKER  08/05/2016    [facility] [employer] pacemaker implantation   • OTHER SURGICAL HISTORY  02/25/2007    PCI/stent, LCX   • PROSTATE SURGERY  03/31/2019    REZUM: prostate   • ROTATOR CUFF REPAIR Left    • TOTAL HIP ARTHROPLASTY Left 04/04/2018    [facility] Dr. Smith   • VENTRICULAR ABLATION SURGERY  01/2018    SVT ablation F       Social History:  Social History     Socioeconomic History   • Marital status:      Spouse name: Not on file   • Number of children: Not on file   • Years of education: Not on file   • Highest education level: Not on file   Tobacco Use   • Smoking status: Current Every Day Smoker     Types: Pipe   • Smokeless tobacco: Never Used   Vaping Use   • Vaping Use: Never used   Substance and Sexual Activity   • Alcohol use: No   • Drug use: No   • Sexual activity: Defer       Review of Systems:  The following systems were reviewed as they relate to the cardiovascular system: Constitutional, Eyes, ENT, Cardiovascular, Respiratory, Gastrointestinal, Integumentary, Neurological, Psychiatric, Hematologic, Endocrine, Musculoskeletal, and Genitourinary. The pertinent cardiovascular findings are reported above with all other cardiovascular points within those systems being negative.    Diagnostic Study Review:     Current Electrocardiogram:    ECG 12 Lead    Date/Time: 4/20/2021 9:52 AM  Performed by: Holly Spivey MD  Authorized by: Holly Spivey MD   Comparison: compared with previous ECG   Similar to previous ECG  Rhythm: sinus rhythm  Rate: normal  BPM: 76  Conduction: conduction normal  QRS axis: normal  Other findings: non-specific ST-T wave changes, left ventricular hypertrophy and poor R wave progression    Clinical impression: abnormal EKG              NOTE: The following portions of the patient's history were reviewed and updated this visit as appropriate: allergies,  current medications, past family history, past medical history, past social history, past surgical history and problem list.

## 2021-05-07 ENCOUNTER — ANTICOAGULATION VISIT (OUTPATIENT)
Dept: CARDIOLOGY | Facility: CLINIC | Age: 86
End: 2021-05-07

## 2021-05-07 DIAGNOSIS — I48.0 PAROXYSMAL ATRIAL FIBRILLATION (HCC): Primary | ICD-10-CM

## 2021-05-07 LAB — INR PPP: 2.4 (ref 0.9–1.1)

## 2021-05-07 PROCEDURE — 85610 PROTHROMBIN TIME: CPT | Performed by: INTERNAL MEDICINE

## 2021-05-07 PROCEDURE — 36416 COLLJ CAPILLARY BLOOD SPEC: CPT | Performed by: INTERNAL MEDICINE

## 2021-05-26 ENCOUNTER — HOSPITAL ENCOUNTER (OUTPATIENT)
Dept: CARDIOLOGY | Facility: HOSPITAL | Age: 86
Discharge: HOME | End: 2021-05-26

## 2021-05-26 ENCOUNTER — HOSPITAL ENCOUNTER (OUTPATIENT)
Dept: CARDIOLOGY | Facility: HOSPITAL | Age: 86
End: 2021-05-26

## 2021-05-26 ENCOUNTER — HOSPITAL ENCOUNTER (OUTPATIENT)
Dept: CARDIOLOGY | Facility: HOSPITAL | Age: 86
Discharge: HOME | End: 2021-05-26
Admitting: INTERNAL MEDICINE

## 2021-05-26 VITALS
DIASTOLIC BLOOD PRESSURE: 69 MMHG | WEIGHT: 149 LBS | SYSTOLIC BLOOD PRESSURE: 155 MMHG | HEART RATE: 66 BPM | BODY MASS INDEX: 20.18 KG/M2 | HEIGHT: 72 IN

## 2021-05-26 DIAGNOSIS — R94.31 ABNORMAL ELECTROCARDIOGRAM (ECG) (EKG): ICD-10-CM

## 2021-05-26 DIAGNOSIS — I49.5 SICK SINUS SYNDROME (HCC): ICD-10-CM

## 2021-05-26 DIAGNOSIS — I10 ESSENTIAL HYPERTENSION: ICD-10-CM

## 2021-05-26 DIAGNOSIS — Z95.0 PRESENCE OF CARDIAC PACEMAKER: ICD-10-CM

## 2021-05-26 DIAGNOSIS — R00.2 PALPITATIONS: ICD-10-CM

## 2021-05-26 DIAGNOSIS — I48.0 PAROXYSMAL ATRIAL FIBRILLATION (HCC): ICD-10-CM

## 2021-05-26 DIAGNOSIS — I25.810 CORONARY ARTERY DISEASE INVOLVING CORONARY BYPASS GRAFT OF NATIVE HEART WITHOUT ANGINA PECTORIS: ICD-10-CM

## 2021-05-26 PROCEDURE — 93306 TTE W/DOPPLER COMPLETE: CPT

## 2021-05-26 PROCEDURE — 93306 TTE W/DOPPLER COMPLETE: CPT | Performed by: INTERNAL MEDICINE

## 2021-05-28 ENCOUNTER — HOSPITAL ENCOUNTER (OUTPATIENT)
Dept: CARDIOLOGY | Facility: HOSPITAL | Age: 86
Discharge: HOME | End: 2021-05-28

## 2021-05-28 PROCEDURE — 93017 CV STRESS TEST TRACING ONLY: CPT

## 2021-05-28 PROCEDURE — A9500 TC99M SESTAMIBI: HCPCS | Performed by: INTERNAL MEDICINE

## 2021-05-28 PROCEDURE — 0 TECHNETIUM SESTAMIBI: Performed by: INTERNAL MEDICINE

## 2021-05-28 PROCEDURE — 25010000002 REGADENOSON 0.4 MG/5ML SOLUTION: Performed by: INTERNAL MEDICINE

## 2021-05-28 PROCEDURE — 93016 CV STRESS TEST SUPVJ ONLY: CPT | Performed by: INTERNAL MEDICINE

## 2021-05-28 PROCEDURE — 78452 HT MUSCLE IMAGE SPECT MULT: CPT

## 2021-05-28 PROCEDURE — 93018 CV STRESS TEST I&R ONLY: CPT | Performed by: INTERNAL MEDICINE

## 2021-05-28 PROCEDURE — 78452 HT MUSCLE IMAGE SPECT MULT: CPT | Performed by: INTERNAL MEDICINE

## 2021-05-28 RX ADMIN — TECHNETIUM TC 99M SESTAMIBI 1 DOSE: 1 INJECTION INTRAVENOUS at 09:02

## 2021-05-28 RX ADMIN — TECHNETIUM TC 99M SESTAMIBI 1 DOSE: 1 INJECTION INTRAVENOUS at 09:55

## 2021-05-28 RX ADMIN — REGADENOSON 0.4 MG: 0.08 INJECTION, SOLUTION INTRAVENOUS at 09:55

## 2021-06-02 LAB
ASCENDING AORTA: 3.3 CM
BH CV ECHO MEAS - ACS: 2.4 CM
BH CV ECHO MEAS - AI DEC SLOPE: 238.6 CM/SEC^2
BH CV ECHO MEAS - AI DEC TIME: 1.7 SEC
BH CV ECHO MEAS - AI MAX PG: 63.1 MMHG
BH CV ECHO MEAS - AI MAX VEL: 397.1 CM/SEC
BH CV ECHO MEAS - AI P1/2T: 487.4 MSEC
BH CV ECHO MEAS - AO MAX PG (FULL): 1.7 MMHG
BH CV ECHO MEAS - AO MAX PG: 8.5 MMHG
BH CV ECHO MEAS - AO MEAN PG (FULL): 1.1 MMHG
BH CV ECHO MEAS - AO MEAN PG: 4 MMHG
BH CV ECHO MEAS - AO ROOT AREA (BSA CORRECTED): 2
BH CV ECHO MEAS - AO ROOT AREA: 11.1 CM^2
BH CV ECHO MEAS - AO ROOT DIAM: 3.8 CM
BH CV ECHO MEAS - AO V2 MAX: 145.7 CM/SEC
BH CV ECHO MEAS - AO V2 MEAN: 92.7 CM/SEC
BH CV ECHO MEAS - AO V2 VTI: 31.7 CM
BH CV ECHO MEAS - ASC AORTA: 3.3 CM
BH CV ECHO MEAS - AVA(I,A): 3.5 CM^2
BH CV ECHO MEAS - AVA(I,D): 3.5 CM^2
BH CV ECHO MEAS - AVA(V,A): 3.5 CM^2
BH CV ECHO MEAS - AVA(V,D): 3.5 CM^2
BH CV ECHO MEAS - BSA(HAYCOCK): 1.8 M^2
BH CV ECHO MEAS - BSA: 1.9 M^2
BH CV ECHO MEAS - BZI_BMI: 20.2 KILOGRAMS/M^2
BH CV ECHO MEAS - BZI_METRIC_HEIGHT: 182.9 CM
BH CV ECHO MEAS - BZI_METRIC_WEIGHT: 67.6 KG
BH CV ECHO MEAS - EDV(CUBED): 125.9 ML
BH CV ECHO MEAS - EDV(MOD-SP2): 137.1 ML
BH CV ECHO MEAS - EDV(MOD-SP4): 117.6 ML
BH CV ECHO MEAS - EDV(TEICH): 118.9 ML
BH CV ECHO MEAS - EF(CUBED): 57.4 %
BH CV ECHO MEAS - EF(MOD-BP): 48 %
BH CV ECHO MEAS - EF(MOD-SP2): 46.9 %
BH CV ECHO MEAS - EF(MOD-SP4): 47.7 %
BH CV ECHO MEAS - EF(TEICH): 48.8 %
BH CV ECHO MEAS - ESV(CUBED): 53.7 ML
BH CV ECHO MEAS - ESV(MOD-SP2): 72.8 ML
BH CV ECHO MEAS - ESV(MOD-SP4): 61.5 ML
BH CV ECHO MEAS - ESV(TEICH): 60.9 ML
BH CV ECHO MEAS - FS: 24.7 %
BH CV ECHO MEAS - IVS/LVPW: 1.3
BH CV ECHO MEAS - IVSD: 1.1 CM
BH CV ECHO MEAS - LA DIMENSION: 3.6 CM
BH CV ECHO MEAS - LA/AO: 0.96
BH CV ECHO MEAS - LAT PEAK E' VEL: 12 CM/SEC
BH CV ECHO MEAS - LV DIASTOLIC VOL/BSA (35-75): 62.5 ML/M^2
BH CV ECHO MEAS - LV IVRT: 0.07 SEC
BH CV ECHO MEAS - LV MASS(C)D: 177 GRAMS
BH CV ECHO MEAS - LV MASS(C)DI: 94.1 GRAMS/M^2
BH CV ECHO MEAS - LV MAX PG: 6.8 MMHG
BH CV ECHO MEAS - LV MEAN PG: 2.8 MMHG
BH CV ECHO MEAS - LV SYSTOLIC VOL/BSA (12-30): 32.7 ML/M^2
BH CV ECHO MEAS - LV V1 MAX: 130.7 CM/SEC
BH CV ECHO MEAS - LV V1 MEAN: 77.7 CM/SEC
BH CV ECHO MEAS - LV V1 VTI: 28 CM
BH CV ECHO MEAS - LVIDD: 5 CM
BH CV ECHO MEAS - LVIDS: 3.8 CM
BH CV ECHO MEAS - LVOT AREA: 3.9 CM^2
BH CV ECHO MEAS - LVOT DIAM: 2.2 CM
BH CV ECHO MEAS - LVPWD: 0.84 CM
BH CV ECHO MEAS - MED PEAK E' VEL: 7 CM/SEC
BH CV ECHO MEAS - MV A MAX VEL: 55.3 CM/SEC
BH CV ECHO MEAS - MV DEC SLOPE: 287.5 CM/SEC^2
BH CV ECHO MEAS - MV DEC TIME: 0.27 SEC
BH CV ECHO MEAS - MV E MAX VEL: 77.5 CM/SEC
BH CV ECHO MEAS - MV E/A: 1.4
BH CV ECHO MEAS - MV MAX PG: 4.5 MMHG
BH CV ECHO MEAS - MV MEAN PG: 1.1 MMHG
BH CV ECHO MEAS - MV P1/2T: 65 MSEC
BH CV ECHO MEAS - MV V2 MAX: 105.7 CM/SEC
BH CV ECHO MEAS - MV V2 MEAN: 47.4 CM/SEC
BH CV ECHO MEAS - MV V2 VTI: 31.2 CM
BH CV ECHO MEAS - MVA(P1/2T): 3.4 CM2
BH CV ECHO MEAS - MVA(VTI): 3.5 CM^2
BH CV ECHO MEAS - PA ACC SLOPE: 604 CM/SEC2
BH CV ECHO MEAS - PA ACC TIME: 0.13 SEC
BH CV ECHO MEAS - PA MAX PG (FULL): 6.7 MMHG
BH CV ECHO MEAS - PA MAX PG: 9.5 MMHG
BH CV ECHO MEAS - PA MEAN PG (FULL): 2.8 MMHG
BH CV ECHO MEAS - PA MEAN PG: 4.2 MMHG
BH CV ECHO MEAS - PA PR(ACCEL): 21.9 MMHG
BH CV ECHO MEAS - PA V2 MAX: 154.3 CM/SEC
BH CV ECHO MEAS - PA V2 MEAN: 93.3 CM/SEC
BH CV ECHO MEAS - PA V2 VTI: 28.9 CM
BH CV ECHO MEAS - PAPD(PI EDV): 5 MMHG
BH CV ECHO MEAS - PI END-D VEL: 70.5 CM/SEC
BH CV ECHO MEAS - PULM A REVS DUR: 0.11 SEC
BH CV ECHO MEAS - PULM A REVS VEL: 18.7 CM/SEC
BH CV ECHO MEAS - PULM DIAS VEL: 77.3 CM/SEC
BH CV ECHO MEAS - PULM S/D: 0.64
BH CV ECHO MEAS - PULM SYS VEL: 49.6 CM/SEC
BH CV ECHO MEAS - RAP SYSTOLE: 3 MMHG
BH CV ECHO MEAS - RV MAX PG: 2.9 MMHG
BH CV ECHO MEAS - RV MEAN PG: 1.5 MMHG
BH CV ECHO MEAS - RV V1 MAX: 84.7 CM/SEC
BH CV ECHO MEAS - RV V1 MEAN: 56.7 CM/SEC
BH CV ECHO MEAS - RV V1 VTI: 18.4 CM
BH CV ECHO MEAS - RVSP: 41.4 MMHG
BH CV ECHO MEAS - SI(AO): 187.1 ML/M^2
BH CV ECHO MEAS - SI(CUBED): 38.4 ML/M^2
BH CV ECHO MEAS - SI(LVOT): 58.5 ML/M^2
BH CV ECHO MEAS - SI(MOD-SP2): 34.2 ML/M^2
BH CV ECHO MEAS - SI(MOD-SP4): 29.8 ML/M^2
BH CV ECHO MEAS - SI(TEICH): 30.9 ML/M^2
BH CV ECHO MEAS - SUP REN AO DIAM: 2.2 CM
BH CV ECHO MEAS - SV(AO): 351.7 ML
BH CV ECHO MEAS - SV(CUBED): 72.2 ML
BH CV ECHO MEAS - SV(LVOT): 109.9 ML
BH CV ECHO MEAS - SV(MOD-SP2): 64.3 ML
BH CV ECHO MEAS - SV(MOD-SP4): 56.1 ML
BH CV ECHO MEAS - SV(TEICH): 58 ML
BH CV ECHO MEAS - TAPSE (>1.6): 1.6 CM
BH CV ECHO MEAS - TR MAX PG: 38 MMHG
BH CV ECHO MEAS - TR MAX VEL: 309.6 CM/SEC
BH CV ECHO MEASUREMENTS AVERAGE E/E' RATIO: 8.16
BH CV REST NUCLEAR ISOTOPE DOSE: 8.6 MCI
BH CV STRESS BP STAGE 1: NORMAL
BH CV STRESS COMMENTS STAGE 1: NORMAL
BH CV STRESS DOSE REGADENOSON STAGE 1: 0.4
BH CV STRESS DURATION MIN STAGE 1: 0
BH CV STRESS DURATION SEC STAGE 1: 10
BH CV STRESS HR STAGE 1: 99
BH CV STRESS NUCLEAR ISOTOPE DOSE: 34.3 MCI
BH CV STRESS PROTOCOL 1: NORMAL
BH CV STRESS RECOVERY BP: NORMAL MMHG
BH CV STRESS RECOVERY HR: 91 BPM
BH CV STRESS STAGE 1: 1
BH CV VAS BP LEFT ARM: NORMAL MMHG
BH CV XLRA - RV BASE: 4 CM
BH CV XLRA - RV MID: 3.1 CM
BH CV XLRA - TDI S': 9 CM/SEC
IVRT: 71 MSEC
LEFT ATRIUM VOLUME INDEX: 32 ML/M2
LEFT ATRIUM VOLUME: 60 CM3
LV EF 2D ECHO EST: 45 %
LV EF NUC BP: 50 %
MAXIMAL PREDICTED HEART RATE: 135 BPM
PERCENT MAX PREDICTED HR: 73.33 %
STJ: 2.8 CM
STRESS BASELINE BP: NORMAL MMHG
STRESS BASELINE HR: 60 BPM
STRESS PERCENT HR: 86 %
STRESS POST PEAK BP: NORMAL MMHG
STRESS POST PEAK HR: 99 BPM
STRESS TARGET HR: 115 BPM

## 2021-06-03 PROCEDURE — 93294 REM INTERROG EVL PM/LDLS PM: CPT | Performed by: INTERNAL MEDICINE

## 2021-06-03 PROCEDURE — 93296 REM INTERROG EVL PM/IDS: CPT | Performed by: INTERNAL MEDICINE

## 2021-06-04 ENCOUNTER — ANTICOAGULATION VISIT (OUTPATIENT)
Dept: CARDIOLOGY | Facility: CLINIC | Age: 86
End: 2021-06-04

## 2021-06-04 DIAGNOSIS — I48.0 PAROXYSMAL ATRIAL FIBRILLATION (HCC): Primary | ICD-10-CM

## 2021-06-04 LAB — INR PPP: 3.3 (ref 0.9–1.1)

## 2021-06-04 PROCEDURE — 85610 PROTHROMBIN TIME: CPT | Performed by: INTERNAL MEDICINE

## 2021-06-04 PROCEDURE — 36416 COLLJ CAPILLARY BLOOD SPEC: CPT | Performed by: INTERNAL MEDICINE

## 2021-06-25 ENCOUNTER — ANTICOAGULATION VISIT (OUTPATIENT)
Dept: CARDIOLOGY | Facility: CLINIC | Age: 86
End: 2021-06-25

## 2021-06-25 DIAGNOSIS — I48.0 PAROXYSMAL ATRIAL FIBRILLATION (HCC): Primary | ICD-10-CM

## 2021-06-25 LAB — INR PPP: 2.5 (ref 0.9–1.1)

## 2021-06-25 PROCEDURE — 85610 PROTHROMBIN TIME: CPT | Performed by: INTERNAL MEDICINE

## 2021-06-25 PROCEDURE — 36416 COLLJ CAPILLARY BLOOD SPEC: CPT | Performed by: INTERNAL MEDICINE

## 2021-07-02 ENCOUNTER — OFFICE VISIT (OUTPATIENT)
Dept: CARDIOLOGY | Facility: CLINIC | Age: 86
End: 2021-07-02

## 2021-07-02 VITALS
WEIGHT: 139 LBS | SYSTOLIC BLOOD PRESSURE: 121 MMHG | OXYGEN SATURATION: 96 % | HEART RATE: 80 BPM | DIASTOLIC BLOOD PRESSURE: 57 MMHG | BODY MASS INDEX: 18.85 KG/M2

## 2021-07-02 DIAGNOSIS — I25.810 CORONARY ARTERY DISEASE INVOLVING CORONARY BYPASS GRAFT OF NATIVE HEART WITHOUT ANGINA PECTORIS: ICD-10-CM

## 2021-07-02 DIAGNOSIS — I49.5 SICK SINUS SYNDROME (HCC): ICD-10-CM

## 2021-07-02 DIAGNOSIS — I48.0 PAROXYSMAL ATRIAL FIBRILLATION (HCC): Primary | ICD-10-CM

## 2021-07-02 DIAGNOSIS — Z95.0 PRESENCE OF CARDIAC PACEMAKER: ICD-10-CM

## 2021-07-02 DIAGNOSIS — I10 ESSENTIAL HYPERTENSION: ICD-10-CM

## 2021-07-02 PROCEDURE — 99213 OFFICE O/P EST LOW 20 MIN: CPT | Performed by: INTERNAL MEDICINE

## 2021-07-02 PROCEDURE — 93000 ELECTROCARDIOGRAM COMPLETE: CPT | Performed by: INTERNAL MEDICINE

## 2021-07-02 RX ORDER — WARFARIN SODIUM 7.5 MG/1
7.5 TABLET ORAL WEEKLY
COMMUNITY

## 2021-07-02 NOTE — PROGRESS NOTES
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 Junior Schuler  is a very pleasant 85 years old anxious gentleman with history of chronic ischemic heart disease status post previous CABG and subsequent PCI stenting.  He has had  history of atrial fibrillation .  He is on long-term anticoagulation therapy as well.  He is currently taking warfarin to prevent cardioembolic episodes.   patient continues to have rapid intermittent atrial arrhythmias associated with fatigue and tiredness and patient is intolerant to sotalol and started on Amiodarone which is unable to tolerate--Patient underwent AF ablation  months ago and comes in for follow-up and  is feeling much better without any significant symptoms--Patient has noticed diffuse subcutaneous bleeding with ongoing intake of warfarin    patient had prior AV roc reentrant tachycardia ablation   chads Vasc score--4  HASBLED--3  Patient is doing remarkably well and denies any chest pain or dyspnea or syncope  No syncope or palpitations since AF ablation  Remains in functional class I for his age  Recent noninvasive testing was done by interventional cardiology which was reviewed with the patient  No angina       Past Medical History:     Reviewed history from 01/26/2018 and no changes required:        Coronary artery disease: S/P CABG and PCI         Hyperlipidemia        Myocardial infarction, anterior        Hypertension        Gastroesophageal reflux disease        Dubose's esophagus        Diverticulitis        Atrial Fibrillation: new onset 12-4-15        SSS, Toby, Tachy : Pacemaker implantation / [employer]         Atrial Fibrillation, paroxysmal         pneumonia        AVNRT ablation--1/2018    Past Surgical History:     Reviewed history from 04/19/2018 and no changes required:        CABG x 4,  8-15-01        PCI/stent, LCX,  2-25-07        Hernia repair        Left rotator cuff repair        9 inches of colon removed        Colonoscopy        Cardiac Cath: 2001:2003;  2007: 2010:         [facility] 12-15-15 / unsuccessful attempt PCI of obtuse marginal branch of circumflex. Represents failed 2nd attempt of PCI of this vessel.         Herniorrhaphy Left Side        [facility] : 8-5-16- [employer] Pacemaker Implantation        SVT ablation 1/2018 [facility]        Left ATHR 4/4/18 Dr. Smith        Review of Systems   General: No fatigue or tiredness, No change in weight   Eyes: No redness  Cardiovascular: No chest pain, no palpitations  Respiratory: No shortness of breath          Physical Exam    General:      well developed, well nourished, in no acute distress.    Head:      normocephalic and atraumatic.    Eyes:      PERRL/EOM intact, conjunctiva and sclera clear with out nystagmus.    Neck:      no masses, thyromegaly   trachea central with normal respiratory effort and PMI is nondisplaced  Lungs:      clear bilaterally to auscultation.    Heart:       regular rate and rhythm, S1, S2 without murmurs, rubs, or gallops      ssessment plan    recurrent and  paroxysmal atrial fibrillation -- post ablation doing well--- no significant A. 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-- post ablation with complete resolution of symptoms   dual-chamber pacemaker in situ with normal function on home monitoring   coronary artery disease--stable without angina and recent stress test done by interventional cardiology reviewed with the patient    Hypertension--- well-controlled  Medications reviewed and follow-up after 6 months and appropriate pacemaker function noted        ECG 12 Lead    Date/Time: 7/2/2021 9:22 AM  Performed by: Froilan Luque MD  Authorized by: Froilan Luque MD    Comparison: compared with previous ECG   Similar to previous ECG  Rhythm: sinus rhythm  Rate: normal  Conduction: conduction normal  QRS axis: normal            Electronically signed by Froilan Luque MD, 07/02/21, 9:22 AM EDT.

## 2021-07-12 RX ORDER — WARFARIN SODIUM 5 MG/1
5 TABLET ORAL NIGHTLY
Qty: 90 TABLET | Refills: 1 | Status: SHIPPED | OUTPATIENT
Start: 2021-07-12 | End: 2021-11-11

## 2021-07-23 ENCOUNTER — ANTICOAGULATION VISIT (OUTPATIENT)
Dept: CARDIOLOGY | Facility: CLINIC | Age: 86
End: 2021-07-23

## 2021-07-23 DIAGNOSIS — I48.0 PAROXYSMAL ATRIAL FIBRILLATION (HCC): Primary | ICD-10-CM

## 2021-07-23 LAB — INR PPP: 2.8 (ref 0.9–1.1)

## 2021-07-23 PROCEDURE — 36416 COLLJ CAPILLARY BLOOD SPEC: CPT | Performed by: INTERNAL MEDICINE

## 2021-07-23 PROCEDURE — 85610 PROTHROMBIN TIME: CPT | Performed by: INTERNAL MEDICINE

## 2021-08-20 ENCOUNTER — ANTICOAGULATION VISIT (OUTPATIENT)
Dept: CARDIOLOGY | Facility: CLINIC | Age: 86
End: 2021-08-20

## 2021-08-20 DIAGNOSIS — I48.0 PAROXYSMAL ATRIAL FIBRILLATION (HCC): Primary | ICD-10-CM

## 2021-08-20 LAB — INR PPP: 4.7 (ref 0.9–1.1)

## 2021-08-20 PROCEDURE — 85610 PROTHROMBIN TIME: CPT | Performed by: INTERNAL MEDICINE

## 2021-08-20 PROCEDURE — 36416 COLLJ CAPILLARY BLOOD SPEC: CPT | Performed by: INTERNAL MEDICINE

## 2021-09-02 PROCEDURE — 93294 REM INTERROG EVL PM/LDLS PM: CPT | Performed by: INTERNAL MEDICINE

## 2021-09-02 PROCEDURE — 93296 REM INTERROG EVL PM/IDS: CPT | Performed by: INTERNAL MEDICINE

## 2021-09-03 ENCOUNTER — ANTICOAGULATION VISIT (OUTPATIENT)
Dept: CARDIOLOGY | Facility: CLINIC | Age: 86
End: 2021-09-03

## 2021-09-03 DIAGNOSIS — I48.0 PAROXYSMAL ATRIAL FIBRILLATION (HCC): Primary | ICD-10-CM

## 2021-09-03 LAB — INR PPP: 2 (ref 0.9–1.1)

## 2021-09-03 PROCEDURE — 85610 PROTHROMBIN TIME: CPT | Performed by: INTERNAL MEDICINE

## 2021-09-03 PROCEDURE — 36416 COLLJ CAPILLARY BLOOD SPEC: CPT | Performed by: INTERNAL MEDICINE

## 2021-09-20 ENCOUNTER — OFFICE VISIT (OUTPATIENT)
Dept: ORTHOPEDIC SURGERY | Facility: CLINIC | Age: 86
End: 2021-09-20

## 2021-09-20 VITALS
BODY MASS INDEX: 18.96 KG/M2 | WEIGHT: 140 LBS | SYSTOLIC BLOOD PRESSURE: 138 MMHG | DIASTOLIC BLOOD PRESSURE: 65 MMHG | HEART RATE: 76 BPM | HEIGHT: 72 IN

## 2021-09-20 DIAGNOSIS — M16.11 PRIMARY OSTEOARTHRITIS OF RIGHT HIP: Primary | ICD-10-CM

## 2021-09-20 PROCEDURE — 99203 OFFICE O/P NEW LOW 30 MIN: CPT | Performed by: FAMILY MEDICINE

## 2021-09-20 NOTE — PROGRESS NOTES
Primary Care Sports Medicine Office Visit Note     Patient ID: Junior MONI Robles is a 85 y.o. male.    Chief Complaint:  Chief Complaint   Patient presents with   • Right Hip - Pain     HPI:    Mr. Junior MONI Robles is a 85 y.o. male who presents to the clinic today for evaluation of chronic right hip pain.  The patient states he has had issues with the right hip for many years, but unfortunately over the last 6 months it has continually and severely worsened.  He has pain with every step, near every day.  He rings XR interpretation from previous outside hospital evaluation.  Please see below.  Otherwise, he has had contralateral FRANKLIN in 2018, and did very well postoperatively.  He is interested in having arthroplasty of this currently affected right hip as well.    Past Medical History:   Diagnosis Date   • Atrial fibrillation (CMS/[facility]) 12/04/2015    new onset   • AVNRT (AV roc re-entry tachycardia) (CMS/[facility]) 01/2018    AVNRT Ablation   • Dubose esophagus    • CAD (coronary artery disease)     S/P CABG and PCI   • Diverticulitis    • Gastroesophageal reflux disease    • Hyperlipidemia    • Hypertension    • Myocardial infarction (CMS/[facility])     anterior   • Paroxysmal atrial fibrillation (CMS/[facility])    • Pneumonia    • SSS (sick sinus syndrome) (CMS/[facility])     SSS, Toby, tachy; pacemaker implantation/ [employer]       Past Surgical History:   Procedure Laterality Date   • CARDIAC CATHETERIZATION      2001:2003; 2007:2010:[facility] 12-15-15/ unsuccessful attempt PCI of obtuse marginal branch of circumflex. represents failed 2nd attempt of PCI of this vessel.   • COLON SURGERY      9 inches on colon removed   • COLONOSCOPY     • CORONARY ANGIOPLASTY     • CORONARY ARTERY BYPASS GRAFT  08/15/2001    x4   • CORONARY STENT PLACEMENT  02/25/2007    LCX    • HERNIA REPAIR     • HERNIA REPAIR Left    • INSERT / REPLACE / REMOVE PACEMAKER  08/05/2016    [facility] [employer] pacemaker implantation   • OTHER SURGICAL HISTORY   "02/25/2007    PCI/stent, LCX   • PROSTATE SURGERY  03/31/2019    REZUM: prostate   • ROTATOR CUFF REPAIR Left    • TOTAL HIP ARTHROPLASTY Left 04/04/2018    ATHR Dr. Smith   • VENTRICULAR ABLATION SURGERY  01/2018    SVT ablation [facility]       Family History   Problem Relation Age of Onset   • Stomach cancer Mother    • Heart disease Father         leaky valve     Social History     Occupational History   • Not on file   Tobacco Use   • Smoking status: Current Every Day Smoker     Types: Cigars   • Smokeless tobacco: Never Used   Vaping Use   • Vaping Use: Never used   Substance and Sexual Activity   • Alcohol use: No   • Drug use: No   • Sexual activity: Defer      Review of Systems   Constitutional: Negative for activity change and fever.   Respiratory: Negative for cough and shortness of breath.    Cardiovascular: Negative for chest pain.   Gastrointestinal: Negative for constipation, diarrhea, nausea and vomiting.   Musculoskeletal: Positive for arthralgias.   Skin: Negative for color change and rash.   Neurological: Negative for weakness.   Hematological: Does not bruise/bleed easily.     Objective:    /65   Pulse 76   Ht 182.9 cm (72\")   Wt 63.5 kg (140 lb)   BMI 18.99 kg/m²     Physical Examination:  Physical Exam  Vitals and nursing note reviewed.   Constitutional:       General: He is not in acute distress.     Appearance: He is well-developed. He is not diaphoretic.   HENT:      Head: Normocephalic and atraumatic.   Eyes:      Conjunctiva/sclera: Conjunctivae normal.   Pulmonary:      Effort: Pulmonary effort is normal. No respiratory distress.   Skin:     General: Skin is warm.      Capillary Refill: Capillary refill takes less than 2 seconds.   Neurological:      Mental Status: He is alert.       Right Hip Exam     Tenderness   The patient is experiencing tenderness in the anterior and posterior.    Muscle Strength   Abduction: 5/5   Adduction: 5/5   Flexion: 5/5     Other   Erythema: " "absent  Sensation: normal  Pulse: present    Comments:  Mildly decreased global range of motion to the right hip, patient defers the rest of examination to surgical evaluation.      Back Exam     Tenderness   The patient is experiencing tenderness in the sacroiliac.        Imaging and other tests:  2-3 view XR of the right hip dated 8/16/2021 from outside facility yields the following IMPRESSION:  1.  Severe degenerative joint disease of the right hip with bone-on-bone articulation, sclerosis, and osteophytes.  Chronic appearing flattening of the lateral aspect of the femoral head.  2.  Prior left total hip arthroplasty, incompletely evaluated.  The visualized proximal prosthesis as expected position and alignment.  3.  Degenerative spondylosis at L4-L5 and L5-S1, incompletely evaluated.  4.  Calcific atherosclerosis.    Most recent imaging from our facility is out of date, dated 4/19/2018.  Per my read, there is severe osteoarthritic change, that seems to have worsened per the above OSH report.    Assessment and Plan:    1. Primary osteoarthritis of right hip    I discussed pathology and treatment options with the patient today.  The patient was very pleasant, but was no longer interested in my evaluation when he learned I was not a surgeon.  He is here for surgical options today.  With the severity of his image findings (I do not have actual images, impression only today), he likely is worthy of total hip arthroplasty. He does not wish to pursue corticosteroid or other with [employer] injections.  We will get him scheduled with our joint replacement surgeon Dr. Hickman, for further evaluation and treatment.    Lino GLASS \"Chance\" Luis GILL DO, CAQSM  09/20/21  13:14 EDT    Disclaimer: Please note that areas of this note were completed with computer voice recognition software.  Quite often unanticipated grammatical, syntax, homophones, and other interpretive errors are inadvertently transcribed by the computer " software. Please excuse any errors that have escaped final proofreading.

## 2021-10-01 ENCOUNTER — ANTICOAGULATION VISIT (OUTPATIENT)
Dept: CARDIOLOGY | Facility: CLINIC | Age: 86
End: 2021-10-01

## 2021-10-01 DIAGNOSIS — I48.0 PAROXYSMAL ATRIAL FIBRILLATION (HCC): Primary | ICD-10-CM

## 2021-10-01 LAB — INR PPP: 4.8 (ref 0.9–1.1)

## 2021-10-01 PROCEDURE — 85610 PROTHROMBIN TIME: CPT | Performed by: INTERNAL MEDICINE

## 2021-10-01 PROCEDURE — 36416 COLLJ CAPILLARY BLOOD SPEC: CPT | Performed by: INTERNAL MEDICINE

## 2021-10-05 ENCOUNTER — ANTICOAGULATION VISIT (OUTPATIENT)
Dept: CARDIOLOGY | Facility: CLINIC | Age: 86
End: 2021-10-05

## 2021-10-05 DIAGNOSIS — I48.0 PAROXYSMAL ATRIAL FIBRILLATION (HCC): Primary | ICD-10-CM

## 2021-10-05 LAB — INR PPP: 2.5 (ref 0.9–1.1)

## 2021-10-05 PROCEDURE — 85610 PROTHROMBIN TIME: CPT | Performed by: INTERNAL MEDICINE

## 2021-10-05 PROCEDURE — 36416 COLLJ CAPILLARY BLOOD SPEC: CPT | Performed by: INTERNAL MEDICINE

## 2021-10-06 ENCOUNTER — OFFICE VISIT (OUTPATIENT)
Dept: ORTHOPEDIC SURGERY | Facility: CLINIC | Age: 86
End: 2021-10-06

## 2021-10-06 VITALS
HEIGHT: 72 IN | BODY MASS INDEX: 18.96 KG/M2 | HEART RATE: 83 BPM | WEIGHT: 140 LBS | SYSTOLIC BLOOD PRESSURE: 157 MMHG | DIASTOLIC BLOOD PRESSURE: 65 MMHG

## 2021-10-06 DIAGNOSIS — M16.11 PRIMARY OSTEOARTHRITIS OF RIGHT HIP: ICD-10-CM

## 2021-10-06 DIAGNOSIS — Z96.642 STATUS POST TOTAL HIP REPLACEMENT, LEFT: Primary | ICD-10-CM

## 2021-10-06 PROCEDURE — 99204 OFFICE O/P NEW MOD 45 MIN: CPT | Performed by: ORTHOPAEDIC SURGERY

## 2021-10-06 NOTE — PROGRESS NOTES
"Chief Complaint  Initial Evaluation of the Right Hip    Subjective    History of Present Illness      Junior MONI Robles is a 85 y.o. male who presents to [facility] for follow-up on left total hip arthroplasty from 5 years ago and persistent right hip pain and discomfort.  History of Present Illness     The patient is a very pleasant 85-year-old gentleman who is quite active. He states that he loves to walk and continues to want to do so. He has had left hip arthroplasty performed through this office approximately 5 years ago; however, does not remember the exact date. He states that he did very well with his left hip arthroplasty surgery. He also states that his right hip was bothering him at that particular time, but he developed some cardiac issues. He had to defer the right hip surgery. Now his right hip \"is killing me.\" The patient states that he has difficulty with his day-to-day tasks. He does not want to end up in a wheelchair. He states that he has to use a cane on occasion due to feeling as if he might stumble and fall. He has been treated by Dr. Froilan Luque for his atrial fibrillation. He is on a combination of Plavix and Coumadin at this point. He states that he was out using a  and feels that he might have twisted his right lower extremity at that point and that seems to have caused increased pain and discomfort and a significant limp on the right side. He states that he would like to proceed with hip arthroplasty as soon as possible.    Pain Location: Right groin and hip  Radiation: from the hip to the proximal femur  Quality: sharp, stabbing  Intensity/Severity: moderate to severe  Duration:  5 years, worse over the past 3 months  Progression of symptoms: yes, progressive worsening  Onset quality: gradual   Timing: intermittent  Aggravating Factors: deep flexion of the hip  Alleviating Factors: anti-inflammatory medication and use a cane  Previous " "Episodes: yes  Associated Symptoms: pain, catching, clicking, and locking  ADLs Affected: Yes  Previous Treatment: left total hip arthroplasty performed 5 years ago       Objective   Vital Signs:   /65 (BP Location: Left arm, Patient Position: Sitting, Cuff Size: Adult)   Pulse 83   Ht 182.9 cm (72\")   Wt 63.5 kg (140 lb)   BMI 18.99 kg/m²     Physical Exam  Physical Exam  Vitals signs and nursing note reviewed.   Constitutional:       Appearance: Normal appearance.   Pulmonary:      Effort: Pulmonary effort is normal.   Skin:     General: Skin is warm and dry.      Capillary Refill: Capillary refill takes less than 2 seconds.   Neurological:      General: No focal deficit present.      Mental Status: He is alert and oriented to person, place, and time. Mental status is at baseline.   Psychiatric:         Mood and Affect: Mood normal.         Behavior: Behavior normal.         Thought Content: Thought content normal.         Judgment: Judgment normal.     Ortho Exam   Right hip (djd): Neurovascular status is intact. Patient does have a limp on the affected side. Internal and external rotations are associated with pain and discomfort. Anterior joint line pain and tenderness is significant. Stinchfield sign is positive. Figure of 4 sign is positive. Patient is unable to perform an active straight leg raise exam. Greater trochanter is tender. Crossover adduction test is positive. Cross body adduction is limited and painful for the patient. Patient has very significant limp and joint line tenderness anteriorly, posteriorly and medially. Dorsalis pedis and posterior tibial artery pulses are palpable. Common peroneal nerve function is well preserved.       Left hip. Patient is post op 5 year(s) from total hip arthoplasty, direct anterior. Incision is clean and healing well. Calf is soft and nontender. Homans sign is negative. Skin and soft tissues are appropriate for postoperative status of the patient. Hip " flexion is 0-80 degrees, hip abduction is 0-30 degrees degrees. No limb lenth discrepancy. Neurovascular status is intact. Patients gait is significantly improved. The pain relief is extremely dramatic for the patient. Dorsalis pedis and posterior tibial artery pulses palpable. Common peroneal function is well preserved. There is no evidence of cellulitis or erythema or deep seated joint infection.          Result Review :   The following data was reviewed by: Frantz Hickman MD on 10/06/2021:                Procedures           Assessment   Assessment and Plan    Diagnoses and all orders for this visit:    1. Status post total hip replacement, left (Primary)    2. Primary osteoarthritis of right hip          Follow Up     · Rest, ice, compression, and elevation (RICE) therapy  · Stretching and strengthening exercises  · OTC Tylenol 500-1000mg by mouth every 6 hours as needed for pain   · Follow up in 3 month(s)  The patient was seen today for preoperative discussion.  The patient has been tried on over-the-counter and prescription NSAID's despite the risks of anti-inflammatory bleeding, peptic ulcers and erosive gastritis with short term benefit only.  Braces have been prescribed for mechanical support.  Patient has been participating in an exercise program specifically targeting joint pain relief with limited benefit. Intraarticular injections have been used periodically with some but not complete relief of pain.  Ambulation aids have also been utilized.    · The details of the surgical procedure were explained including the location of probable incisions and a description of the likely hardware/grafts to be used. The patient understands the likely convalescence after surgery as well as the rehabilitation required.  Also, we have thoroughly discussed with the patient the risks, benefits and alternatives to surgery.  Risks include but are not limited to the risk of infection, joint stiffness, limited range of motion,  wound healing problems, scar tissue build up, myocardial infarction, stroke, blood clots (including DVT and/or pulmonary embolus along with the risk of death) neurologic and/or vascular injury, limb length discrepancy, fracture, dislocation, nonunion, malunion, continued pain and need for further surgery including hardware failure requiring revision.   • Patient was given instructions and counseling regarding his condition or for health maintenance advice. Please see specific information pulled into the AVS if appropriate.   • I had an extensive discussion with the patient about different treatment options and he would like to proceed with a right hip arthroplasty surgery as soon as possible.  • The risks are high at his age and especially with his cardiac function. The patient understands that and will get the clearance from his primary care physician as well as his cardiologist.  • The time spent in the office with the patient today is 45 minutes going over treatment options, including coordinating his care with his primary care provider and his cardiologist. All risks and benefits of hip replacement surgery discussed with the patient at length. We will go ahead and schedule the surgery for the patient as soon as possible.      Frantz Hickman MD   Date of Encounter: 10/6/2021   Electronically signed by Frantz Hickman MD, 10/06/21, 11:28 AM EDT.     EMR Dragon/Transcription disclaimer:  Much of this encounter note is an electronic transcription/translation of spoken language to printed text. The electronic translation of spoken language may permit erroneous, or at times, nonsensical words or phrases to be inadvertently transcribed; Although I have reviewed the note for such errors, some may still exist.     Transcribed from ambient dictation for Frantz Hickman MD by Ras Zaldivar.  10/06/21   13:10 EDT    I have personally performed the services described in this document as transcribed by the above individual, and it is  both accurate and complete.  Frantz Hickman MD  10/6/2021  13:59 EDT

## 2021-10-15 ENCOUNTER — ANTICOAGULATION VISIT (OUTPATIENT)
Dept: CARDIOLOGY | Facility: CLINIC | Age: 86
End: 2021-10-15

## 2021-10-15 DIAGNOSIS — I48.0 PAROXYSMAL ATRIAL FIBRILLATION (HCC): Primary | ICD-10-CM

## 2021-10-15 LAB — INR PPP: 2.3 (ref 0.9–1.1)

## 2021-10-15 PROCEDURE — 85610 PROTHROMBIN TIME: CPT | Performed by: INTERNAL MEDICINE

## 2021-10-15 PROCEDURE — 36416 COLLJ CAPILLARY BLOOD SPEC: CPT | Performed by: INTERNAL MEDICINE

## 2021-10-20 ENCOUNTER — OFFICE VISIT (OUTPATIENT)
Dept: CARDIOLOGY | Facility: CLINIC | Age: 86
End: 2021-10-20

## 2021-10-20 VITALS
SYSTOLIC BLOOD PRESSURE: 153 MMHG | RESPIRATION RATE: 18 BRPM | DIASTOLIC BLOOD PRESSURE: 65 MMHG | HEART RATE: 68 BPM | WEIGHT: 141 LBS | BODY MASS INDEX: 19.1 KG/M2 | HEIGHT: 72 IN | OXYGEN SATURATION: 100 %

## 2021-10-20 DIAGNOSIS — I48.0 PAROXYSMAL ATRIAL FIBRILLATION (HCC): ICD-10-CM

## 2021-10-20 DIAGNOSIS — I25.10 CORONARY ARTERY DISEASE INVOLVING NATIVE CORONARY ARTERY OF NATIVE HEART WITHOUT ANGINA PECTORIS: ICD-10-CM

## 2021-10-20 DIAGNOSIS — Z95.0 PRESENCE OF CARDIAC PACEMAKER: ICD-10-CM

## 2021-10-20 DIAGNOSIS — I49.5 SICK SINUS SYNDROME (HCC): Primary | ICD-10-CM

## 2021-10-20 DIAGNOSIS — I48.92 ATRIAL FLUTTER, UNSPECIFIED TYPE (HCC): ICD-10-CM

## 2021-10-20 DIAGNOSIS — I21.9 MYOCARDIAL INFARCTION, UNSPECIFIED MI TYPE, UNSPECIFIED ARTERY (HCC): ICD-10-CM

## 2021-10-20 PROCEDURE — 93000 ELECTROCARDIOGRAM COMPLETE: CPT | Performed by: INTERNAL MEDICINE

## 2021-10-20 PROCEDURE — 99214 OFFICE O/P EST MOD 30 MIN: CPT | Performed by: INTERNAL MEDICINE

## 2021-10-20 NOTE — PROGRESS NOTES
Cardiology Office Visit      Encounter Date:  10/20/2021    Patient ID:   Junior MONI Robles is a 85 y.o. male.      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 As you are well aware, this is a 85 y.o. male with history of chronic ischemic heart disease status post previous CABG and subsequent PCI stenting.  He has had  history of atrial fibrillation  and  is on long-term anticoagulation   therapy with warfarin.       Patient underwent AF ablation and  has been feeling much better without any significant symptoms-he does have easy bruisability and takes clopidogrel and warfarin.   Patient had prior AV roc reentrant tachycardia ablation.  He is intolerant to numerous medications including beta-blocker therapy.  He is currently on warfarin to prevent cardioembolic episodes. chads Vasc score--4.  He is status post permanent pacemaker implant       Interval History:  Patient denies any new complaints denies any symptoms of chest pain  dizziness or syncope  Denies any new complaints except for some shortness of breath with exertion and activity  Dizziness or syncope    Interpretation Summary    · Abnormal myocardial perfusion study  · Large severe intensity myocardial perfusion defect involving the anterior wall anteroseptal wall and anteroapical wall suggestive of prior myocardial infarction in this territory with small brigette-infarct ischemia  · Impressions are consistent with an intermediate risk study.  · Left ventricular ejection fraction is normal. (Calculated EF = 50%).  · Clinical correlation is recommended    Interpretation Summary    · The left  ventricular cavity is mildly dilated.  · Left ventricular wall thickness is consistent with mild concentric hypertrophy.  · Estimated left ventricular EF = 45% Left ventricular systolic function is mildly decreased.  · The following left ventricular wall segments are hypokinetic: mid anterior, apical anterior, apical septal, mid inferoseptal, apex hypokinetic and mid anteroseptal.  · Estimated right ventricular systolic pressure from tricuspid regurgitation is mildly elevated (35-45 mmHg).  · Mild pulmonary hypertension is present.  · Left ventricular diastolic function is consistent with (grade II w/high LAP) pseudonormalization.        Assessment & Plan    Impressions:  1- CAD,   Status post CABG.  Status post PCI stenting.  No anginal symptoms.      2- recurrent and  paroxysmal atrial fibrillation -- post ablation doing well very rare palpitations short-lived.    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-- post ablation with complete resolution of symptoms     4- dual-chamber pacemaker in situ with normal function      5-  hypertension  Under excellent control  Exertional shortness of breath and dyspnea  Ischemic cardiomyopathy with LV ejection fraction of 45%  Abnormal stress test with prior myocardial infarction with no significant reversible ischemia    Recommendations:  Continue current medical therapy  Continued aggressive risk factor modification  Patient is currently being considered for possible hip surgery for arthritis  Patient is tolerating anticoagulation therapy without any GI bleeding issues  Patient denies any exertional chest pain does not see any reason to proceed with any further invasive work-up after discussion the results of the stress test and echocardiogram  Continue current conservative medical management  Patient needs a hip replacement surgery from cardiac standpoint patient is most likely moderate risk but is not prohibitive secondary to underlying known  "coronary artery disease cardiomyopathy and atrial arrhythmias  Patient is currently on statin anticoagulation therapy with Coumadin and Plavix  Follow-up in office in 6 months  Thanks very much for allowing us to participate in the care of your patients    Objective:    Vitals:  Vitals:    10/20/21 0822   BP: 153/65   BP Location: Left arm   Patient Position: Sitting   Cuff Size: Large Adult   Pulse: 68   Resp: 18   SpO2: 100%   Weight: 64 kg (141 lb)   Height: 182.9 cm (72\")       Physical Exam:    General: Alert, cooperative, no distress, appears stated age  Head:  Normocephalic, atraumatic, mucous membranes moist  Eyes:  Conjunctiva/corneas clear, EOM's intact     Neck:  Supple,  no adenopathy;      Lungs: Clear to auscultation bilaterally, no wheezes rhonchi rales are noted  Chest wall: No tenderness  Heart::  Regular rate and rhythm, S1 and S2 normal, no murmur, rub or gallop  Abdomen: Soft, non-tender, nondistended bowel sounds active  Extremities: No cyanosis, clubbing, or edema  Pulses: 2+ and symmetric all extremities  Skin:  No rashes or lesions  Neuro/psych: A&O x3. CN II through XII are grossly intact with appropriate affect      Allergies:  Allergies   Allergen Reactions   • Valsartan Other (See Comments)     Weakness, loss of appetite    • Atorvastatin Calcium Myalgia   • Codeine Other (See Comments)     Lightheaded        Medication Review:     Current Outpatient Medications:   •  amLODIPine (NORVASC) 2.5 MG tablet, Take 2.5 mg by mouth Daily., Disp: , Rfl:   •  Cholecalciferol (VITAMIN D-1000 MAX ST) 25 MCG (1000 UT) tablet, Take 1,000 Units by mouth Daily., Disp: , Rfl:   •  clopidogrel (PLAVIX) 75 MG tablet, TAKE 1 TABLET EVERY DAY, Disp: 90 tablet, Rfl: 3  •  mupirocin (BACTROBAN) 2 % ointment, Apply a pea-sized amount to each nostril twice daily for 5 days prior to surgery., Disp: 22 g, Rfl: 0  •  nitroglycerin (NITROSTAT) 0.4 MG SL tablet, NITROSTAT 0.4 MG SUBL, Disp: , Rfl:   •  pantoprazole " (PROTONIX) 40 MG EC tablet, Daily., Disp: , Rfl:   •  rosuvastatin (CRESTOR) 20 MG tablet, Take one half tablet daily, Disp: , Rfl:   •  vitamin C (ASCORBIC ACID) 500 MG tablet, VITAMIN C 500 MG TABS, Disp: , Rfl:   •  warfarin (COUMADIN) 5 MG tablet, Take 1 tablet by mouth Every Night. Everyday except Tuesday and Friday or as directed., Disp: 90 tablet, Rfl: 1  •  warfarin (COUMADIN) 7.5 MG tablet, Take 7.5 mg by mouth Daily. Tuesday and friday, Disp: , Rfl:     Family History:  Family History   Problem Relation Age of Onset   • Stomach cancer Mother    • Heart disease Father         leaky valve       Past Medical History:  Past Medical History:   Diagnosis Date   • Atrial fibrillation (HCC) 12/04/2015    new onset   • AVNRT (AV roc re-entry tachycardia) (Formerly [facility]) 01/2018    AVNRT Ablation   • Dubose esophagus    • CAD (coronary artery disease)     S/P CABG and PCI   • Diverticulitis    • Gastroesophageal reflux disease    • Hyperlipidemia    • Hypertension    • Myocardial infarction ([facility])     anterior   • Paroxysmal atrial fibrillation ([facility])    • Pneumonia    • SSS (sick sinus syndrome) (Former[facility])     SSS, Toby, tachy; pacemaker implantation/ [employer]       Past surgical History:  Past Surgical History:   Procedure Laterality Date   • CARDIAC CATHETERIZATION      2001:2003; 2007:2010:[facility] 12-15-15/ unsuccessful attempt PCI of obtuse marginal branch of circumflex. represents failed 2nd attempt of PCI of this vessel.   • COLON SURGERY      9 inches on colon removed   • COLONOSCOPY     • CORONARY ANGIOPLASTY     • CORONARY ARTERY BYPASS GRAFT  08/15/2001    x4   • CORONARY STENT PLACEMENT  02/25/2007    LCX    • HERNIA REPAIR     • HERNIA REPAIR Left    • INSERT / REPLACE / REMOVE PACEMAKER  08/05/2016    [facility] [employer] pacemaker implantation   • OTHER SURGICAL HISTORY  02/25/2007    PCI/stent, LCX   • PROSTATE SURGERY  03/31/2019    REZUM: prostate   • ROTATOR CUFF REPAIR Left    • TOTAL HIP ARTHROPLASTY  Left 04/04/2018    [facility] Dr. Smith   • VENTRICULAR ABLATION SURGERY  01/2018    SVT ablation F       Social History:  Social History     Socioeconomic History   • Marital status:    Tobacco Use   • Smoking status: Current Every Day Smoker     Types: Cigars   • Smokeless tobacco: Never Used   Vaping Use   • Vaping Use: Never used   Substance and Sexual Activity   • Alcohol use: No   • Drug use: No   • Sexual activity: Defer       Review of Systems:  The following systems were reviewed as they relate to the cardiovascular system: Constitutional, Eyes, ENT, Cardiovascular, Respiratory, Gastrointestinal, Integumentary, Neurological, Psychiatric, Hematologic, Endocrine, Musculoskeletal, and Genitourinary. The pertinent cardiovascular findings are reported above with all other cardiovascular points within those systems being negative.    Diagnostic Study Review:     Current Electrocardiogram:    ECG 12 Lead    Date/Time: 10/20/2021 8:47 AM  Performed by: Holly Spivey MD  Authorized by: Holly Spivey MD   Comparison: compared with previous ECG   Similar to previous ECG  Rhythm: sinus rhythm  Rate: normal  BPM: 68  Conduction: conduction normal  QRS axis: normal  Other findings: non-specific ST-T wave changes    Clinical impression: abnormal EKG              NOTE: The following portions of the patient's history were reviewed and updated this visit as appropriate: allergies, current medications, past family history, past medical history, past social history, past surgical history and problem list.

## 2021-11-11 RX ORDER — WARFARIN SODIUM 5 MG/1
TABLET ORAL
Qty: 90 TABLET | Refills: 1 | Status: ON HOLD | OUTPATIENT
Start: 2021-11-11 | End: 2021-12-01

## 2021-11-12 ENCOUNTER — ANTICOAGULATION VISIT (OUTPATIENT)
Dept: CARDIOLOGY | Facility: CLINIC | Age: 86
End: 2021-11-12

## 2021-11-12 DIAGNOSIS — I48.0 PAROXYSMAL ATRIAL FIBRILLATION (HCC): Primary | ICD-10-CM

## 2021-11-12 LAB — INR PPP: 2.9 (ref 0.9–1.1)

## 2021-11-12 PROCEDURE — 36416 COLLJ CAPILLARY BLOOD SPEC: CPT | Performed by: INTERNAL MEDICINE

## 2021-11-12 PROCEDURE — 85610 PROTHROMBIN TIME: CPT | Performed by: INTERNAL MEDICINE

## 2021-11-16 DIAGNOSIS — M16.11 PRIMARY OSTEOARTHRITIS OF RIGHT HIP: Primary | ICD-10-CM

## 2021-11-17 ENCOUNTER — LAB (OUTPATIENT)
Dept: LAB | Facility: HOSPITAL | Age: 86
End: 2021-11-17

## 2021-11-17 ENCOUNTER — HOSPITAL ENCOUNTER (OUTPATIENT)
Dept: GENERAL RADIOLOGY | Facility: HOSPITAL | Age: 86
Discharge: HOME | End: 2021-11-17

## 2021-11-17 DIAGNOSIS — M16.11 PRIMARY OSTEOARTHRITIS OF RIGHT HIP: ICD-10-CM

## 2021-11-17 LAB
ABO GROUP BLD: NORMAL
ANION GAP SERPL CALCULATED.3IONS-SCNC: 8.5 MMOL/L (ref 5–15)
BLD GP AB SCN SERPL QL: NEGATIVE
BUN SERPL-MCNC: 21 MG/DL (ref 8–23)
BUN/CREAT SERPL: 17.4 (ref 7–25)
CALCIUM SPEC-SCNC: 9.3 MG/DL (ref 8.6–10.5)
CHLORIDE SERPL-SCNC: 106 MMOL/L (ref 98–107)
CO2 SERPL-SCNC: 26.5 MMOL/L (ref 22–29)
CREAT SERPL-MCNC: 1.21 MG/DL (ref 0.76–1.27)
DEPRECATED RDW RBC AUTO: 49.8 FL (ref 37–54)
ERYTHROCYTE [DISTWIDTH] IN BLOOD BY AUTOMATED COUNT: 12.7 % (ref 12.3–15.4)
GFR SERPL CREATININE-BSD FRML MDRD: 57 ML/MIN/1.73
GLUCOSE SERPL-MCNC: 103 MG/DL (ref 65–99)
HCT VFR BLD AUTO: 32.9 % (ref 37.5–51)
HGB BLD-MCNC: 10.2 G/DL (ref 13–17.7)
MCH RBC QN AUTO: 32.8 PG (ref 26.6–33)
MCHC RBC AUTO-ENTMCNC: 31 G/DL (ref 31.5–35.7)
MCV RBC AUTO: 105.8 FL (ref 79–97)
MRSA DNA SPEC QL NAA+PROBE: NORMAL
PLATELET # BLD AUTO: 240 10*3/MM3 (ref 140–450)
PMV BLD AUTO: 9.8 FL (ref 6–12)
POTASSIUM SERPL-SCNC: 4.1 MMOL/L (ref 3.5–5.2)
RBC # BLD AUTO: 3.11 10*6/MM3 (ref 4.14–5.8)
RH BLD: POSITIVE
SODIUM SERPL-SCNC: 141 MMOL/L (ref 136–145)
T&S EXPIRATION DATE: NORMAL
WBC NRBC COR # BLD: 5.64 10*3/MM3 (ref 3.4–10.8)

## 2021-11-17 PROCEDURE — 86901 BLOOD TYPING SEROLOGIC RH(D): CPT

## 2021-11-17 PROCEDURE — 36415 COLL VENOUS BLD VENIPUNCTURE: CPT

## 2021-11-17 PROCEDURE — 86900 BLOOD TYPING SEROLOGIC ABO: CPT

## 2021-11-17 PROCEDURE — 86923 COMPATIBILITY TEST ELECTRIC: CPT

## 2021-11-17 PROCEDURE — 86850 RBC ANTIBODY SCREEN: CPT

## 2021-11-17 PROCEDURE — 80048 BASIC METABOLIC PNL TOTAL CA: CPT

## 2021-11-17 PROCEDURE — 85027 COMPLETE CBC AUTOMATED: CPT

## 2021-11-17 PROCEDURE — 71046 X-RAY EXAM CHEST 2 VIEWS: CPT

## 2021-11-17 PROCEDURE — 87641 MR-STAPH DNA AMP PROBE: CPT

## 2021-11-18 ENCOUNTER — TELEPHONE (OUTPATIENT)
Dept: ORTHOPEDIC SURGERY | Facility: CLINIC | Age: 86
End: 2021-11-18

## 2021-11-18 NOTE — TELEPHONE ENCOUNTER
Cardiac surgery clearance for Dr. Hickman surgery on 11/30 right [place]    ----- Message from Froilan PHILLIP MD sent at 11/16/2021  4:48 PM EST -----  Patient cleared for surgery and needs warfarin to be stopped 3 days before surgery    Dr Luque  ----- Message -----  From: Cathy Youssef MA  Sent: 11/16/2021   9:29 AM EST  To: Froilan PHILLIP MD

## 2021-11-22 RX ORDER — CALCIUM POLYCARBOPHIL 625 MG
1250 TABLET ORAL DAILY
COMMUNITY
End: 2022-12-30

## 2021-11-22 RX ORDER — DOCUSATE SODIUM 100 MG/1
200 CAPSULE, LIQUID FILLED ORAL DAILY
COMMUNITY
End: 2022-12-30

## 2021-11-27 ENCOUNTER — LAB (OUTPATIENT)
Dept: LAB | Facility: HOSPITAL | Age: 86
End: 2021-11-27

## 2021-11-27 LAB — SARS-COV-2 ORF1AB RESP QL NAA+PROBE: NOT DETECTED

## 2021-11-27 PROCEDURE — C9803 HOPD COVID-19 SPEC COLLECT: HCPCS

## 2021-11-27 PROCEDURE — U0004 COV-19 TEST NON-CDC HGH THRU: HCPCS

## 2021-11-29 ENCOUNTER — ANESTHESIA EVENT (OUTPATIENT)
Dept: PERIOP | Facility: HOSPITAL | Age: 86
End: 2021-11-29

## 2021-11-30 ENCOUNTER — APPOINTMENT (OUTPATIENT)
Dept: GENERAL RADIOLOGY | Facility: HOSPITAL | Age: 86
End: 2021-11-30

## 2021-11-30 ENCOUNTER — ANESTHESIA (OUTPATIENT)
Dept: PERIOP | Facility: HOSPITAL | Age: 86
End: 2021-11-30

## 2021-11-30 ENCOUNTER — HOSPITAL ENCOUNTER (INPATIENT)
Facility: HOSPITAL | Age: 86
LOS: 4 days | Discharge: NURSING, REHAB OR LONG-TERM CARE | End: 2021-12-04
Attending: ORTHOPAEDIC SURGERY | Admitting: ORTHOPAEDIC SURGERY

## 2021-11-30 DIAGNOSIS — M16.11 PRIMARY OSTEOARTHRITIS OF RIGHT HIP: ICD-10-CM

## 2021-11-30 LAB
APTT PPP: 28.4 SECONDS (ref 24–31)
BACTERIA UR QL AUTO: NORMAL /HPF
HBA1C MFR BLD: 5.5 % (ref 3.5–5.6)
HYALINE CASTS UR QL AUTO: NORMAL /LPF
INR PPP: 1.08 (ref 2–3)
INR PPP: 1.13 (ref 2–3)
PROTHROMBIN TIME: 11.9 SECONDS (ref 19.4–28.5)
PROTHROMBIN TIME: 12.4 SECONDS (ref 19.4–28.5)
RBC # UR STRIP: NORMAL /HPF
REF LAB TEST METHOD: NORMAL
SQUAMOUS #/AREA URNS HPF: NORMAL /HPF
WBC # UR STRIP: NORMAL /HPF

## 2021-11-30 PROCEDURE — 0SR90JZ REPLACEMENT OF RIGHT HIP JOINT WITH SYNTHETIC SUBSTITUTE, OPEN APPROACH: ICD-10-PCS | Performed by: ORTHOPAEDIC SURGERY

## 2021-11-30 PROCEDURE — C1713 ANCHOR/SCREW BN/BN,TIS/BN: HCPCS | Performed by: ORTHOPAEDIC SURGERY

## 2021-11-30 PROCEDURE — 99222 1ST HOSP IP/OBS MODERATE 55: CPT | Performed by: INTERNAL MEDICINE

## 2021-11-30 PROCEDURE — 97162 PT EVAL MOD COMPLEX 30 MIN: CPT

## 2021-11-30 PROCEDURE — 73501 X-RAY EXAM HIP UNI 1 VIEW: CPT

## 2021-11-30 PROCEDURE — 85610 PROTHROMBIN TIME: CPT | Performed by: PHYSICIAN ASSISTANT

## 2021-11-30 PROCEDURE — 25010000002 ONDANSETRON PER 1 MG

## 2021-11-30 PROCEDURE — G0378 HOSPITAL OBSERVATION PER HR: HCPCS

## 2021-11-30 PROCEDURE — 81015 MICROSCOPIC EXAM OF URINE: CPT | Performed by: ORTHOPAEDIC SURGERY

## 2021-11-30 PROCEDURE — C1889 IMPLANT/INSERT DEVICE, NOC: HCPCS | Performed by: ORTHOPAEDIC SURGERY

## 2021-11-30 PROCEDURE — C1776 JOINT DEVICE (IMPLANTABLE): HCPCS | Performed by: ORTHOPAEDIC SURGERY

## 2021-11-30 PROCEDURE — 25010000002 CEFAZOLIN PER 500 MG: Performed by: PHYSICIAN ASSISTANT

## 2021-11-30 PROCEDURE — 27130 TOTAL HIP ARTHROPLASTY: CPT | Performed by: ORTHOPAEDIC SURGERY

## 2021-11-30 PROCEDURE — 25010000002 HYDROMORPHONE PER 4 MG

## 2021-11-30 PROCEDURE — 76000 FLUOROSCOPY <1 HR PHYS/QHP: CPT

## 2021-11-30 PROCEDURE — 83036 HEMOGLOBIN GLYCOSYLATED A1C: CPT | Performed by: ORTHOPAEDIC SURGERY

## 2021-11-30 PROCEDURE — 25010000002 PROPOFOL 10 MG/ML EMULSION

## 2021-11-30 PROCEDURE — 85730 THROMBOPLASTIN TIME PARTIAL: CPT | Performed by: ORTHOPAEDIC SURGERY

## 2021-11-30 PROCEDURE — 0 BUPIVACAINE LIPOSOME 1.3 % SUSPENSION 10 ML VIAL: Performed by: ORTHOPAEDIC SURGERY

## 2021-11-30 PROCEDURE — 25010000002 ROPIVACAINE PER 1 MG: Performed by: ORTHOPAEDIC SURGERY

## 2021-11-30 PROCEDURE — 25010000002 FENTANYL CITRATE (PF) 100 MCG/2ML SOLUTION

## 2021-11-30 PROCEDURE — 25010000002 NEOSTIGMINE 5 MG/5ML SOLUTION

## 2021-11-30 PROCEDURE — 0 CEFAZOLIN PER 500 MG: Performed by: ORTHOPAEDIC SURGERY

## 2021-11-30 PROCEDURE — 72170 X-RAY EXAM OF PELVIS: CPT

## 2021-11-30 PROCEDURE — C9290 INJ, BUPIVACAINE LIPOSOME: HCPCS | Performed by: ORTHOPAEDIC SURGERY

## 2021-11-30 PROCEDURE — S0260 H&P FOR SURGERY: HCPCS | Performed by: ORTHOPAEDIC SURGERY

## 2021-11-30 PROCEDURE — 85610 PROTHROMBIN TIME: CPT | Performed by: ORTHOPAEDIC SURGERY

## 2021-11-30 PROCEDURE — 25010000002 PROPOFOL 200 MG/20ML EMULSION

## 2021-11-30 DEVICE — DEV CONTRL TISS STRATAFIX SPIRAL MNCRYL UD 3/0 PLS 30CM: Type: IMPLANTABLE DEVICE | Site: HIP | Status: FUNCTIONAL

## 2021-11-30 DEVICE — SCRW ACET CORT TRILOGY S/TAP 6.5X20: Type: IMPLANTABLE DEVICE | Site: HIP | Status: FUNCTIONAL

## 2021-11-30 DEVICE — SEAL HEMO SURG ARISTA/AH ABS/PWDR 3GM: Type: IMPLANTABLE DEVICE | Site: HIP | Status: FUNCTIONAL

## 2021-11-30 DEVICE — STEM FEM/HIP AVENIR/COMPLETE HI/OFFST HA SZ7.5: Type: IMPLANTABLE DEVICE | Site: HIP | Status: FUNCTIONAL

## 2021-11-30 DEVICE — CP HIP UPCHRG OSSEOTI LTD HL CUPS: Type: IMPLANTABLE DEVICE | Site: HIP | Status: FUNCTIONAL

## 2021-11-30 DEVICE — SUT NONABS MAXBRAID/PE NMBR2 C7 38IN BLU 900335: Type: IMPLANTABLE DEVICE | Site: HIP | Status: FUNCTIONAL

## 2021-11-30 DEVICE — LINER ACET G7 VIVACIT/E NTRL HXPE SZF 40MM: Type: IMPLANTABLE DEVICE | Site: HIP | Status: FUNCTIONAL

## 2021-11-30 DEVICE — BIOLOX® DELTA, CERAMIC FEMORAL HEAD, S, Ø 40/-3.5, TAPER 12/14
Type: IMPLANTABLE DEVICE | Site: HIP | Status: FUNCTIONAL
Brand: BIOLOX® DELTA

## 2021-11-30 DEVICE — TOTAL HIP PRIMARY: Type: IMPLANTABLE DEVICE | Site: HIP | Status: FUNCTIONAL

## 2021-11-30 DEVICE — SHLL ACET OSSEOTI G7 4H SZF 56MM: Type: IMPLANTABLE DEVICE | Site: HIP | Status: FUNCTIONAL

## 2021-11-30 RX ORDER — AMLODIPINE BESYLATE 2.5 MG/1
2.5 TABLET ORAL DAILY
Status: DISCONTINUED | OUTPATIENT
Start: 2021-12-01 | End: 2021-12-04 | Stop reason: HOSPADM

## 2021-11-30 RX ORDER — LIDOCAINE HYDROCHLORIDE 20 MG/ML
INJECTION, SOLUTION EPIDURAL; INFILTRATION; INTRACAUDAL; PERINEURAL AS NEEDED
Status: DISCONTINUED | OUTPATIENT
Start: 2021-11-30 | End: 2021-11-30 | Stop reason: SURG

## 2021-11-30 RX ORDER — ZOLPIDEM TARTRATE 5 MG/1
5 TABLET ORAL NIGHTLY PRN
Status: DISCONTINUED | OUTPATIENT
Start: 2021-11-30 | End: 2021-12-04 | Stop reason: HOSPADM

## 2021-11-30 RX ORDER — GUAIFENESIN 200 MG/1
400 TABLET ORAL DAILY
Status: DISCONTINUED | OUTPATIENT
Start: 2021-11-30 | End: 2021-11-30

## 2021-11-30 RX ORDER — OXYCODONE HYDROCHLORIDE 5 MG/1
5 TABLET ORAL EVERY 4 HOURS PRN
Status: DISCONTINUED | OUTPATIENT
Start: 2021-11-30 | End: 2021-12-04 | Stop reason: HOSPADM

## 2021-11-30 RX ORDER — WARFARIN SODIUM 5 MG/1
5 TABLET ORAL DAILY
Status: DISCONTINUED | OUTPATIENT
Start: 2021-11-30 | End: 2021-11-30

## 2021-11-30 RX ORDER — SODIUM CHLORIDE 0.9 % (FLUSH) 0.9 %
3 SYRINGE (ML) INJECTION EVERY 12 HOURS SCHEDULED
Status: DISCONTINUED | OUTPATIENT
Start: 2021-11-30 | End: 2021-11-30 | Stop reason: HOSPADM

## 2021-11-30 RX ORDER — ONDANSETRON 2 MG/ML
4 INJECTION INTRAMUSCULAR; INTRAVENOUS EVERY 6 HOURS PRN
Status: DISCONTINUED | OUTPATIENT
Start: 2021-11-30 | End: 2021-12-04 | Stop reason: HOSPADM

## 2021-11-30 RX ORDER — EPHEDRINE SULFATE 5 MG/ML
5 INJECTION INTRAVENOUS ONCE AS NEEDED
Status: DISCONTINUED | OUTPATIENT
Start: 2021-11-30 | End: 2021-11-30 | Stop reason: HOSPADM

## 2021-11-30 RX ORDER — ROSUVASTATIN CALCIUM 10 MG/1
20 TABLET, COATED ORAL NIGHTLY
Status: DISCONTINUED | OUTPATIENT
Start: 2021-11-30 | End: 2021-12-04 | Stop reason: HOSPADM

## 2021-11-30 RX ORDER — SODIUM CHLORIDE 9 MG/ML
100 INJECTION, SOLUTION INTRAVENOUS CONTINUOUS
Status: DISCONTINUED | OUTPATIENT
Start: 2021-11-30 | End: 2021-12-04 | Stop reason: HOSPADM

## 2021-11-30 RX ORDER — ONDANSETRON 2 MG/ML
INJECTION INTRAMUSCULAR; INTRAVENOUS AS NEEDED
Status: DISCONTINUED | OUTPATIENT
Start: 2021-11-30 | End: 2021-11-30 | Stop reason: SURG

## 2021-11-30 RX ORDER — ACETAMINOPHEN 650 MG
TABLET, EXTENDED RELEASE ORAL AS NEEDED
Status: DISCONTINUED | OUTPATIENT
Start: 2021-11-30 | End: 2021-11-30 | Stop reason: HOSPADM

## 2021-11-30 RX ORDER — GABAPENTIN 100 MG/1
100 CAPSULE ORAL NIGHTLY
Status: DISCONTINUED | OUTPATIENT
Start: 2021-11-30 | End: 2021-12-04 | Stop reason: HOSPADM

## 2021-11-30 RX ORDER — DIPHENHYDRAMINE HYDROCHLORIDE 50 MG/ML
25 INJECTION INTRAMUSCULAR; INTRAVENOUS EVERY 6 HOURS PRN
Status: DISCONTINUED | OUTPATIENT
Start: 2021-11-30 | End: 2021-12-04 | Stop reason: HOSPADM

## 2021-11-30 RX ORDER — MORPHINE SULFATE 4 MG/ML
4 INJECTION, SOLUTION INTRAMUSCULAR; INTRAVENOUS
Status: DISCONTINUED | OUTPATIENT
Start: 2021-11-30 | End: 2021-12-04 | Stop reason: HOSPADM

## 2021-11-30 RX ORDER — DOCUSATE SODIUM 100 MG/1
200 CAPSULE, LIQUID FILLED ORAL DAILY
Status: DISCONTINUED | OUTPATIENT
Start: 2021-11-30 | End: 2021-12-04 | Stop reason: HOSPADM

## 2021-11-30 RX ORDER — POLYETHYLENE GLYCOL 3350 17 G/17G
17 POWDER, FOR SOLUTION ORAL DAILY
Status: DISCONTINUED | OUTPATIENT
Start: 2021-11-30 | End: 2021-12-02

## 2021-11-30 RX ORDER — CLOPIDOGREL BISULFATE 75 MG/1
75 TABLET ORAL DAILY
Status: DISCONTINUED | OUTPATIENT
Start: 2021-11-30 | End: 2021-12-04 | Stop reason: HOSPADM

## 2021-11-30 RX ORDER — FERROUS SULFATE TAB EC 324 MG (65 MG FE EQUIVALENT) 324 (65 FE) MG
324 TABLET DELAYED RESPONSE ORAL
Status: DISCONTINUED | OUTPATIENT
Start: 2021-12-01 | End: 2021-12-04 | Stop reason: HOSPADM

## 2021-11-30 RX ORDER — WARFARIN SODIUM 5 MG/1
5 TABLET ORAL
Status: DISCONTINUED | OUTPATIENT
Start: 2021-11-30 | End: 2021-12-04 | Stop reason: HOSPADM

## 2021-11-30 RX ORDER — MIDAZOLAM HYDROCHLORIDE 1 MG/ML
0.5 INJECTION INTRAMUSCULAR; INTRAVENOUS
Status: DISCONTINUED | OUTPATIENT
Start: 2021-11-30 | End: 2021-11-30 | Stop reason: HOSPADM

## 2021-11-30 RX ORDER — DIPHENHYDRAMINE HCL 25 MG
25 CAPSULE ORAL EVERY 6 HOURS PRN
Status: DISCONTINUED | OUTPATIENT
Start: 2021-11-30 | End: 2021-12-04 | Stop reason: HOSPADM

## 2021-11-30 RX ORDER — PROMETHAZINE HYDROCHLORIDE 25 MG/1
25 TABLET ORAL ONCE AS NEEDED
Status: DISCONTINUED | OUTPATIENT
Start: 2021-11-30 | End: 2021-11-30 | Stop reason: HOSPADM

## 2021-11-30 RX ORDER — GUAR GUM
1 PACKET (EA) ORAL DAILY
Status: DISCONTINUED | OUTPATIENT
Start: 2021-11-30 | End: 2021-12-04 | Stop reason: HOSPADM

## 2021-11-30 RX ORDER — ACETAMINOPHEN 500 MG
1000 TABLET ORAL EVERY 8 HOURS
Status: DISCONTINUED | OUTPATIENT
Start: 2021-11-30 | End: 2021-12-04 | Stop reason: HOSPADM

## 2021-11-30 RX ORDER — HYDROMORPHONE HCL 110MG/55ML
0.25 PATIENT CONTROLLED ANALGESIA SYRINGE INTRAVENOUS
Status: DISCONTINUED | OUTPATIENT
Start: 2021-11-30 | End: 2021-11-30 | Stop reason: HOSPADM

## 2021-11-30 RX ORDER — SODIUM CHLORIDE, SODIUM LACTATE, POTASSIUM CHLORIDE, CALCIUM CHLORIDE 600; 310; 30; 20 MG/100ML; MG/100ML; MG/100ML; MG/100ML
9 INJECTION, SOLUTION INTRAVENOUS CONTINUOUS PRN
Status: DISCONTINUED | OUTPATIENT
Start: 2021-11-30 | End: 2021-11-30 | Stop reason: HOSPADM

## 2021-11-30 RX ORDER — FENTANYL CITRATE 50 UG/ML
25 INJECTION, SOLUTION INTRAMUSCULAR; INTRAVENOUS
Status: DISCONTINUED | OUTPATIENT
Start: 2021-11-30 | End: 2021-11-30 | Stop reason: HOSPADM

## 2021-11-30 RX ORDER — SODIUM CHLORIDE 0.9 % (FLUSH) 0.9 %
3-10 SYRINGE (ML) INJECTION AS NEEDED
Status: DISCONTINUED | OUTPATIENT
Start: 2021-11-30 | End: 2021-11-30 | Stop reason: HOSPADM

## 2021-11-30 RX ORDER — HYDRALAZINE HYDROCHLORIDE 20 MG/ML
5 INJECTION INTRAMUSCULAR; INTRAVENOUS
Status: DISCONTINUED | OUTPATIENT
Start: 2021-11-30 | End: 2021-11-30 | Stop reason: HOSPADM

## 2021-11-30 RX ORDER — ONDANSETRON 4 MG/1
4 TABLET, FILM COATED ORAL EVERY 6 HOURS PRN
Status: DISCONTINUED | OUTPATIENT
Start: 2021-11-30 | End: 2021-12-04 | Stop reason: HOSPADM

## 2021-11-30 RX ORDER — PANTOPRAZOLE SODIUM 40 MG/1
40 TABLET, DELAYED RELEASE ORAL NIGHTLY
Status: DISCONTINUED | OUTPATIENT
Start: 2021-11-30 | End: 2021-12-04 | Stop reason: HOSPADM

## 2021-11-30 RX ORDER — NALOXONE HCL 0.4 MG/ML
0.4 VIAL (ML) INJECTION
Status: DISCONTINUED | OUTPATIENT
Start: 2021-11-30 | End: 2021-12-04 | Stop reason: HOSPADM

## 2021-11-30 RX ORDER — NEOSTIGMINE METHYLSULFATE 5 MG/5 ML
SYRINGE (ML) INTRAVENOUS AS NEEDED
Status: DISCONTINUED | OUTPATIENT
Start: 2021-11-30 | End: 2021-11-30 | Stop reason: SURG

## 2021-11-30 RX ORDER — LABETALOL HYDROCHLORIDE 5 MG/ML
5 INJECTION, SOLUTION INTRAVENOUS
Status: DISCONTINUED | OUTPATIENT
Start: 2021-11-30 | End: 2021-11-30 | Stop reason: HOSPADM

## 2021-11-30 RX ORDER — NALOXONE HCL 0.4 MG/ML
0.4 VIAL (ML) INJECTION AS NEEDED
Status: DISCONTINUED | OUTPATIENT
Start: 2021-11-30 | End: 2021-11-30 | Stop reason: HOSPADM

## 2021-11-30 RX ORDER — ONDANSETRON 2 MG/ML
4 INJECTION INTRAMUSCULAR; INTRAVENOUS ONCE AS NEEDED
Status: DISCONTINUED | OUTPATIENT
Start: 2021-11-30 | End: 2021-11-30 | Stop reason: HOSPADM

## 2021-11-30 RX ORDER — PROMETHAZINE HYDROCHLORIDE 12.5 MG/1
12.5 TABLET ORAL EVERY 6 HOURS PRN
Status: DISCONTINUED | OUTPATIENT
Start: 2021-11-30 | End: 2021-12-04 | Stop reason: HOSPADM

## 2021-11-30 RX ORDER — ROCURONIUM BROMIDE 10 MG/ML
INJECTION, SOLUTION INTRAVENOUS AS NEEDED
Status: DISCONTINUED | OUTPATIENT
Start: 2021-11-30 | End: 2021-11-30 | Stop reason: SURG

## 2021-11-30 RX ORDER — TRAMADOL HYDROCHLORIDE 50 MG/1
50 TABLET ORAL EVERY 6 HOURS PRN
Status: DISCONTINUED | OUTPATIENT
Start: 2021-11-30 | End: 2021-12-04 | Stop reason: HOSPADM

## 2021-11-30 RX ORDER — ACETAMINOPHEN 500 MG
1000 TABLET ORAL ONCE
Status: DISCONTINUED | OUTPATIENT
Start: 2021-11-30 | End: 2021-11-30 | Stop reason: HOSPADM

## 2021-11-30 RX ORDER — OXYCODONE HCL 10 MG/1
10 TABLET, FILM COATED, EXTENDED RELEASE ORAL ONCE
Status: DISCONTINUED | OUTPATIENT
Start: 2021-11-30 | End: 2021-11-30 | Stop reason: HOSPADM

## 2021-11-30 RX ORDER — MELOXICAM 15 MG/1
15 TABLET ORAL DAILY
Status: DISCONTINUED | OUTPATIENT
Start: 2021-12-01 | End: 2021-12-02

## 2021-11-30 RX ORDER — MAGNESIUM HYDROXIDE 1200 MG/15ML
LIQUID ORAL AS NEEDED
Status: DISCONTINUED | OUTPATIENT
Start: 2021-11-30 | End: 2021-11-30 | Stop reason: HOSPADM

## 2021-11-30 RX ORDER — PHENYLEPHRINE HCL IN 0.9% NACL 1 MG/10 ML
SYRINGE (ML) INTRAVENOUS AS NEEDED
Status: DISCONTINUED | OUTPATIENT
Start: 2021-11-30 | End: 2021-11-30 | Stop reason: SURG

## 2021-11-30 RX ORDER — GUAIFENESIN 200 MG/1
400 TABLET ORAL DAILY
Status: DISCONTINUED | OUTPATIENT
Start: 2021-12-01 | End: 2021-12-04 | Stop reason: HOSPADM

## 2021-11-30 RX ORDER — SODIUM CHLORIDE 0.9 % (FLUSH) 0.9 %
10 SYRINGE (ML) INJECTION AS NEEDED
Status: DISCONTINUED | OUTPATIENT
Start: 2021-11-30 | End: 2021-11-30 | Stop reason: HOSPADM

## 2021-11-30 RX ORDER — SODIUM CHLORIDE 0.9 % (FLUSH) 0.9 %
10 SYRINGE (ML) INJECTION EVERY 12 HOURS SCHEDULED
Status: DISCONTINUED | OUTPATIENT
Start: 2021-11-30 | End: 2021-11-30 | Stop reason: HOSPADM

## 2021-11-30 RX ORDER — GLYCOPYRROLATE 1 MG/5 ML
SYRINGE (ML) INTRAVENOUS AS NEEDED
Status: DISCONTINUED | OUTPATIENT
Start: 2021-11-30 | End: 2021-11-30 | Stop reason: SURG

## 2021-11-30 RX ORDER — FENTANYL CITRATE 50 UG/ML
INJECTION, SOLUTION INTRAMUSCULAR; INTRAVENOUS AS NEEDED
Status: DISCONTINUED | OUTPATIENT
Start: 2021-11-30 | End: 2021-11-30 | Stop reason: SURG

## 2021-11-30 RX ORDER — GABAPENTIN 300 MG/1
300 CAPSULE ORAL ONCE
Status: DISCONTINUED | OUTPATIENT
Start: 2021-11-30 | End: 2021-11-30 | Stop reason: HOSPADM

## 2021-11-30 RX ORDER — PROPOFOL 10 MG/ML
INJECTION, EMULSION INTRAVENOUS AS NEEDED
Status: DISCONTINUED | OUTPATIENT
Start: 2021-11-30 | End: 2021-11-30 | Stop reason: SURG

## 2021-11-30 RX ORDER — EPHEDRINE SULFATE 5 MG/ML
INJECTION INTRAVENOUS AS NEEDED
Status: DISCONTINUED | OUTPATIENT
Start: 2021-11-30 | End: 2021-11-30 | Stop reason: SURG

## 2021-11-30 RX ADMIN — TRAMADOL HYDROCHLORIDE 50 MG: 50 TABLET, COATED ORAL at 17:08

## 2021-11-30 RX ADMIN — Medication 150 MCG: at 11:55

## 2021-11-30 RX ADMIN — PANTOPRAZOLE SODIUM 40 MG: 40 TABLET, DELAYED RELEASE ORAL at 20:57

## 2021-11-30 RX ADMIN — ROCURONIUM BROMIDE 40 MG: 10 SOLUTION INTRAVENOUS at 10:24

## 2021-11-30 RX ADMIN — Medication 1 PACKET: at 17:22

## 2021-11-30 RX ADMIN — Medication 150 MCG: at 11:43

## 2021-11-30 RX ADMIN — EPHEDRINE SULFATE 5 MG: 5 INJECTION INTRAVENOUS at 11:18

## 2021-11-30 RX ADMIN — POLYETHYLENE GLYCOL 3350 17 G: 17 POWDER, FOR SOLUTION ORAL at 17:08

## 2021-11-30 RX ADMIN — ROCURONIUM BROMIDE 10 MG: 10 SOLUTION INTRAVENOUS at 10:58

## 2021-11-30 RX ADMIN — HYDROMORPHONE HYDROCHLORIDE 0.5 MG: 2 INJECTION, SOLUTION INTRAMUSCULAR; INTRAVENOUS; SUBCUTANEOUS at 10:45

## 2021-11-30 RX ADMIN — FENTANYL CITRATE 50 MCG: 0.05 INJECTION, SOLUTION INTRAMUSCULAR; INTRAVENOUS at 10:24

## 2021-11-30 RX ADMIN — Medication 150 MCG: at 12:02

## 2021-11-30 RX ADMIN — PROPOFOL 100 MCG/KG/MIN: 10 INJECTION, EMULSION INTRAVENOUS at 10:26

## 2021-11-30 RX ADMIN — CEFAZOLIN SODIUM 2 G: 1 INJECTION, POWDER, FOR SOLUTION INTRAMUSCULAR; INTRAVENOUS at 10:28

## 2021-11-30 RX ADMIN — HYDROMORPHONE HYDROCHLORIDE 0.25 MG: 2 INJECTION, SOLUTION INTRAMUSCULAR; INTRAVENOUS; SUBCUTANEOUS at 13:05

## 2021-11-30 RX ADMIN — CEFAZOLIN SODIUM 2 G: 10 INJECTION, POWDER, FOR SOLUTION INTRAVENOUS at 17:09

## 2021-11-30 RX ADMIN — WARFARIN 5 MG: 5 TABLET ORAL at 17:21

## 2021-11-30 RX ADMIN — ROCURONIUM BROMIDE 10 MG: 10 SOLUTION INTRAVENOUS at 11:38

## 2021-11-30 RX ADMIN — FENTANYL CITRATE 25 MCG: 0.05 INJECTION, SOLUTION INTRAMUSCULAR; INTRAVENOUS at 12:14

## 2021-11-30 RX ADMIN — Medication 100 MCG: at 11:18

## 2021-11-30 RX ADMIN — PROPOFOL 150 MG: 10 INJECTION, EMULSION INTRAVENOUS at 10:24

## 2021-11-30 RX ADMIN — Medication 150 MCG: at 11:47

## 2021-11-30 RX ADMIN — SODIUM CHLORIDE 100 ML/HR: 9 INJECTION, SOLUTION INTRAVENOUS at 16:13

## 2021-11-30 RX ADMIN — EPHEDRINE SULFATE 5 MG: 5 INJECTION INTRAVENOUS at 11:43

## 2021-11-30 RX ADMIN — Medication 100 MCG: at 10:40

## 2021-11-30 RX ADMIN — SODIUM CHLORIDE, POTASSIUM CHLORIDE, SODIUM LACTATE AND CALCIUM CHLORIDE: 600; 310; 30; 20 INJECTION, SOLUTION INTRAVENOUS at 11:32

## 2021-11-30 RX ADMIN — EPHEDRINE SULFATE 5 MG: 5 INJECTION INTRAVENOUS at 10:42

## 2021-11-30 RX ADMIN — Medication 0.6 MG: at 12:06

## 2021-11-30 RX ADMIN — Medication 100 MCG: at 10:55

## 2021-11-30 RX ADMIN — ACETAMINOPHEN 1000 MG: 500 TABLET, FILM COATED ORAL at 17:00

## 2021-11-30 RX ADMIN — Medication 3 MG: at 12:06

## 2021-11-30 RX ADMIN — GABAPENTIN 100 MG: 100 CAPSULE ORAL at 20:59

## 2021-11-30 RX ADMIN — SODIUM CHLORIDE, POTASSIUM CHLORIDE, SODIUM LACTATE AND CALCIUM CHLORIDE 9 ML/HR: 600; 310; 30; 20 INJECTION, SOLUTION INTRAVENOUS at 08:44

## 2021-11-30 RX ADMIN — CLOPIDOGREL BISULFATE 75 MG: 75 TABLET ORAL at 20:59

## 2021-11-30 RX ADMIN — EPHEDRINE SULFATE 5 MG: 5 INJECTION INTRAVENOUS at 10:45

## 2021-11-30 RX ADMIN — OXYCODONE 5 MG: 5 TABLET ORAL at 20:58

## 2021-11-30 RX ADMIN — HYDROMORPHONE HYDROCHLORIDE 0.25 MG: 2 INJECTION, SOLUTION INTRAMUSCULAR; INTRAVENOUS; SUBCUTANEOUS at 13:23

## 2021-11-30 RX ADMIN — LIDOCAINE HYDROCHLORIDE 80 MG: 20 INJECTION, SOLUTION EPIDURAL; INFILTRATION; INTRACAUDAL; PERINEURAL at 10:24

## 2021-11-30 RX ADMIN — FENTANYL CITRATE 25 MCG: 0.05 INJECTION, SOLUTION INTRAMUSCULAR; INTRAVENOUS at 10:51

## 2021-11-30 RX ADMIN — DOCUSATE SODIUM 200 MG: 100 CAPSULE, LIQUID FILLED ORAL at 17:08

## 2021-11-30 RX ADMIN — ROSUVASTATIN CALCIUM 20 MG: 10 TABLET, FILM COATED ORAL at 20:57

## 2021-11-30 RX ADMIN — Medication 100 MCG: at 10:45

## 2021-11-30 RX ADMIN — EPHEDRINE SULFATE 5 MG: 5 INJECTION INTRAVENOUS at 10:55

## 2021-11-30 RX ADMIN — EPHEDRINE SULFATE 5 MG: 5 INJECTION INTRAVENOUS at 11:47

## 2021-11-30 RX ADMIN — ONDANSETRON 4 MG: 2 INJECTION INTRAMUSCULAR; INTRAVENOUS at 12:00

## 2021-11-30 NOTE — ANESTHESIA PREPROCEDURE EVALUATION
Anesthesia Evaluation     Patient summary reviewed and Nursing notes reviewed   no history of anesthetic complications:  NPO Solid Status: > 8 hours  NPO Liquid Status: > 8 hours           Airway   Mallampati: II  TM distance: >3 FB  Neck ROM: full  No difficulty expected  Dental    (+) edentulous    Pulmonary - normal exam   (+) a smoker Current Abstained day of surgery, COPD,   Cardiovascular     Rhythm: irregular  Rate: normal    (+) pacemaker pacemaker interrogated unknown, hypertension, past MI  >12 months, CAD, CABG >6 Months, dysrhythmias Atrial Fib, Atrial Flutter, hyperlipidemia,     ROS comment: Clearance on chart    Neuro/Psych- negative ROS  GI/Hepatic/Renal/Endo    (+)  GERD,      Musculoskeletal     Abdominal  - normal exam   Substance History - negative use     OB/GYN negative ob/gyn ROS         Other   arthritis,                    Anesthesia Plan    ASA 3     general   total IV anesthesia  intravenous induction     Anesthetic plan, all risks, benefits, and alternatives have been provided, discussed and informed consent has been obtained with: patient.    Plan discussed with CAA.

## 2021-11-30 NOTE — ANESTHESIA POSTPROCEDURE EVALUATION
Patient: Junior MONI Robles    Procedure Summary     Date: 11/30/21 Room / Location: [facility] OR  / [facility] MAIN OR    Anesthesia Start: 1018 Anesthesia Stop: 1222    Procedure: TOTAL HIP ARTHROPLASTY ANTERIOR WITH HANA TABLE (Right Hip) Diagnosis:       Primary osteoarthritis of right hip      (Primary osteoarthritis of right hip [M16.11])    Surgeons: Frantz Hickman MD Provider: Josh Porras MD    Anesthesia Type: general ASA Status: 3          Anesthesia Type: general    Vitals  Vitals Value Taken Time   /47 11/30/21 1331   Temp 97.1 °F (36.2 °C) 11/30/21 1222   Pulse 64 11/30/21 1333   Resp 13 11/30/21 1307   SpO2 92 % 11/30/21 1333   Vitals shown include unvalidated device data.        Post Anesthesia Care and Evaluation    Patient location during evaluation: PACU  Patient participation: complete - patient participated  Level of consciousness: awake  Pain scale: See nurse's notes for pain score.  Pain management: adequate  Airway patency: patent  Anesthetic complications: No anesthetic complications  PONV Status: none  Cardiovascular status: acceptable  Respiratory status: acceptable  Hydration status: acceptable    Comments: Patient seen and examined postoperatively; vital signs stable; SpO2 greater than or equal to 90%; cardiopulmonary status stable; nausea/vomiting adequately controlled; pain adequately controlled; no apparent anesthesia complications; patient discharged from anesthesia care when discharge criteria were met

## 2021-11-30 NOTE — ANESTHESIA PROCEDURE NOTES
Airway  Urgency: elective    Date/Time: 11/30/2021 10:26 AM    General Information and Staff    Patient location during procedure: OR  Anesthesiologist: Josh Porras MD  CRNA: Dhara Villa CAA    Indications and Patient Condition  Indications for airway management: airway protection    Preoxygenated: yes  Mask difficulty assessment: 3 - difficult mask (inadequate, unstable or two providers) +/- NMBA    Final Airway Details  Final airway type: endotracheal airway      Successful airway: ETT  Cuffed: yes   Successful intubation technique: direct laryngoscopy  Facilitating devices/methods: intubating stylet  Endotracheal tube insertion site: oral  Blade: Yue  Blade size: 4  ETT size (mm): 7.5  Cormack-Lehane Classification: grade I - full view of glottis  Placement verified by: chest auscultation and capnometry   Measured from: lips  ETT/EBT  to lips (cm): 23  Number of attempts at approach: 1  Assessment: lips, teeth, and gum same as pre-op and atraumatic intubation

## 2021-12-01 PROBLEM — E44.0 MODERATE MALNUTRITION: Status: ACTIVE | Noted: 2021-12-01

## 2021-12-01 LAB
ANION GAP SERPL CALCULATED.3IONS-SCNC: 9 MMOL/L (ref 5–15)
BUN SERPL-MCNC: 21 MG/DL (ref 8–23)
BUN/CREAT SERPL: 17.4 (ref 7–25)
CALCIUM SPEC-SCNC: 7.5 MG/DL (ref 8.6–10.5)
CHLORIDE SERPL-SCNC: 101 MMOL/L (ref 98–107)
CO2 SERPL-SCNC: 25 MMOL/L (ref 22–29)
CREAT SERPL-MCNC: 1.21 MG/DL (ref 0.76–1.27)
GFR SERPL CREATININE-BSD FRML MDRD: 57 ML/MIN/1.73
GLUCOSE SERPL-MCNC: 130 MG/DL (ref 65–99)
HCT VFR BLD AUTO: 21.1 % (ref 37.5–51)
HCT VFR BLD AUTO: 21.9 % (ref 37.5–51)
HGB BLD-MCNC: 7.4 G/DL (ref 13–17.7)
HGB BLD-MCNC: 7.4 G/DL (ref 13–17.7)
INR PPP: 1.11 (ref 2–3)
POTASSIUM SERPL-SCNC: 4.4 MMOL/L (ref 3.5–5.2)
PROTHROMBIN TIME: 12.2 SECONDS (ref 19.4–28.5)
SODIUM SERPL-SCNC: 135 MMOL/L (ref 136–145)

## 2021-12-01 PROCEDURE — 97116 GAIT TRAINING THERAPY: CPT

## 2021-12-01 PROCEDURE — 85018 HEMOGLOBIN: CPT | Performed by: PHYSICIAN ASSISTANT

## 2021-12-01 PROCEDURE — 36430 TRANSFUSION BLD/BLD COMPNT: CPT

## 2021-12-01 PROCEDURE — 97110 THERAPEUTIC EXERCISES: CPT

## 2021-12-01 PROCEDURE — 97165 OT EVAL LOW COMPLEX 30 MIN: CPT

## 2021-12-01 PROCEDURE — 85014 HEMATOCRIT: CPT | Performed by: PHYSICIAN ASSISTANT

## 2021-12-01 PROCEDURE — 80048 BASIC METABOLIC PNL TOTAL CA: CPT | Performed by: PHYSICIAN ASSISTANT

## 2021-12-01 PROCEDURE — 25010000002 CEFAZOLIN PER 500 MG: Performed by: PHYSICIAN ASSISTANT

## 2021-12-01 PROCEDURE — 85610 PROTHROMBIN TIME: CPT | Performed by: PHYSICIAN ASSISTANT

## 2021-12-01 PROCEDURE — 99024 POSTOP FOLLOW-UP VISIT: CPT | Performed by: PHYSICIAN ASSISTANT

## 2021-12-01 PROCEDURE — 99232 SBSQ HOSP IP/OBS MODERATE 35: CPT | Performed by: INTERNAL MEDICINE

## 2021-12-01 PROCEDURE — P9016 RBC LEUKOCYTES REDUCED: HCPCS

## 2021-12-01 PROCEDURE — 63710000001 ONDANSETRON PER 8 MG: Performed by: PHYSICIAN ASSISTANT

## 2021-12-01 PROCEDURE — 86900 BLOOD TYPING SEROLOGIC ABO: CPT

## 2021-12-01 RX ORDER — DIPHENHYDRAMINE HCL 25 MG
12.5 TABLET ORAL ONCE
Status: DISCONTINUED | OUTPATIENT
Start: 2021-12-01 | End: 2021-12-01

## 2021-12-01 RX ORDER — TAMSULOSIN HYDROCHLORIDE 0.4 MG/1
0.4 CAPSULE ORAL DAILY
Status: DISCONTINUED | OUTPATIENT
Start: 2021-12-01 | End: 2021-12-04 | Stop reason: HOSPADM

## 2021-12-01 RX ORDER — WARFARIN SODIUM 5 MG/1
5 TABLET ORAL
COMMUNITY
End: 2022-04-11

## 2021-12-01 RX ORDER — CALCIUM CARBONATE 200(500)MG
2 TABLET,CHEWABLE ORAL
Status: DISCONTINUED | OUTPATIENT
Start: 2021-12-01 | End: 2021-12-04 | Stop reason: HOSPADM

## 2021-12-01 RX ADMIN — CALCIUM CARBONATE (ANTACID) CHEW TAB 500 MG 2 TABLET: 500 CHEW TAB at 21:31

## 2021-12-01 RX ADMIN — TAMSULOSIN HYDROCHLORIDE 0.4 MG: 0.4 CAPSULE ORAL at 16:27

## 2021-12-01 RX ADMIN — ACETAMINOPHEN 1000 MG: 500 TABLET, FILM COATED ORAL at 16:27

## 2021-12-01 RX ADMIN — AMLODIPINE BESYLATE 2.5 MG: 2.5 TABLET ORAL at 09:07

## 2021-12-01 RX ADMIN — ZOLPIDEM TARTRATE 5 MG: 5 TABLET ORAL at 23:41

## 2021-12-01 RX ADMIN — ACETAMINOPHEN 1000 MG: 500 TABLET, FILM COATED ORAL at 01:10

## 2021-12-01 RX ADMIN — FERROUS SULFATE TAB EC 324 MG (65 MG FE EQUIVALENT) 324 MG: 324 (65 FE) TABLET DELAYED RESPONSE at 09:07

## 2021-12-01 RX ADMIN — SODIUM CHLORIDE 100 ML/HR: 9 INJECTION, SOLUTION INTRAVENOUS at 04:32

## 2021-12-01 RX ADMIN — Medication 1 PACKET: at 10:00

## 2021-12-01 RX ADMIN — ROSUVASTATIN CALCIUM 20 MG: 10 TABLET, FILM COATED ORAL at 21:17

## 2021-12-01 RX ADMIN — ACETAMINOPHEN 1000 MG: 500 TABLET, FILM COATED ORAL at 23:41

## 2021-12-01 RX ADMIN — PANTOPRAZOLE SODIUM 40 MG: 40 TABLET, DELAYED RELEASE ORAL at 21:18

## 2021-12-01 RX ADMIN — WARFARIN 5 MG: 5 TABLET ORAL at 18:01

## 2021-12-01 RX ADMIN — OXYCODONE 5 MG: 5 TABLET ORAL at 06:56

## 2021-12-01 RX ADMIN — SODIUM CHLORIDE 100 ML/HR: 9 INJECTION, SOLUTION INTRAVENOUS at 19:49

## 2021-12-01 RX ADMIN — MELOXICAM 15 MG: 15 TABLET ORAL at 09:07

## 2021-12-01 RX ADMIN — GUAIFENESIN 400 MG: 200 TABLET ORAL at 09:07

## 2021-12-01 RX ADMIN — CEFAZOLIN SODIUM 2 G: 10 INJECTION, POWDER, FOR SOLUTION INTRAVENOUS at 01:13

## 2021-12-01 RX ADMIN — POLYETHYLENE GLYCOL 3350 17 G: 17 POWDER, FOR SOLUTION ORAL at 09:07

## 2021-12-01 RX ADMIN — ONDANSETRON HYDROCHLORIDE 4 MG: 4 TABLET, FILM COATED ORAL at 02:10

## 2021-12-01 RX ADMIN — GABAPENTIN 100 MG: 100 CAPSULE ORAL at 21:18

## 2021-12-01 RX ADMIN — DIPHENHYDRAMINE HYDROCHLORIDE 12.5 MG: 25 SOLUTION ORAL at 10:02

## 2021-12-01 RX ADMIN — DOCUSATE SODIUM 200 MG: 100 CAPSULE, LIQUID FILLED ORAL at 09:07

## 2021-12-01 RX ADMIN — ACETAMINOPHEN 1000 MG: 500 TABLET, FILM COATED ORAL at 09:07

## 2021-12-01 NOTE — THERAPY EVALUATION
Patient Name: Junior MONI Robles  : 1935    MRN: [id number]                              Today's Date: 2021       Admit Date: 2021    Visit Dx:     ICD-10-CM ICD-9-CM   1. Primary osteoarthritis of right hip  M16.11 715.15     Patient Active Problem List   Diagnosis   • Sick sinus syndrome (HCC)   • Atrial flutter (HCC)   • Presence of cardiac pacemaker   • CAD (coronary artery disease)   • Myocardial infarction (HCC)   • Atrial fibrillation (HCC)   • Status post total hip replacement, left   • Primary osteoarthritis of right hip     Past Medical History:   Diagnosis Date   • Atrial fibrillation (HCC) 2015    new onset   • AVNRT (AV roc re-entry tachycardia) (HCC) 2018    AVNRT Ablation   • Dubose esophagus    • CAD (coronary artery disease)     S/P CABG and PCI   • Diverticulitis    • Gastroesophageal reflux disease    • Hyperlipidemia    • Hypertension    • Myocardial infarction (HCC)     anterior   • Paroxysmal atrial fibrillation (HCC)     havent been in afib   • Pneumonia    • SSS (sick sinus syndrome) ([facility])     SSS, Toby, tachy; pacemaker implantation/ [employer]     Past Surgical History:   Procedure Laterality Date   • CARDIAC CATHETERIZATION      2001:2003; 2007:2010:[facility] 12-15-15/ unsuccessful attempt PCI of obtuse marginal branch of circumflex. represents failed 2nd attempt of PCI of this vessel.   • COLON SURGERY      9 inches on colon removed   • COLONOSCOPY     • CORONARY ANGIOPLASTY     • CORONARY ARTERY BYPASS GRAFT  08/15/2001    x4   • CORONARY STENT PLACEMENT  2007    LCX    • HERNIA REPAIR Right    • HERNIA REPAIR Left    • INSERT / REPLACE / REMOVE PACEMAKER  2016    [facility] [employer] pacemaker implantation   • PROSTATE SURGERY  2019    REZUM: prostate   • ROTATOR CUFF REPAIR Left    • TOTAL HIP ARTHROPLASTY Left 2018    ATHR Dr. Smith   • VENTRICULAR ABLATION SURGERY  2018    SVT ablation [facility]      General Information     Row Name  12/01/21 1652          OT Time and Intention    Document Type evaluation  -     Mode of Treatment occupational therapy  -     Row Name 12/01/21 1652          General Information    Patient Profile Reviewed yes  -MP     Prior Level of Function independent:; ADL's  -MP     Existing Precautions/Restrictions right; hip, anterior; fall  -MP     Row Name 12/01/21 1652          Living Environment    Lives With grandchild(mony); spouse  -     Row Name 12/01/21 1652          Home Main Entrance    Number of Stairs, Main Entrance one  -     Row Name 12/01/21 1652          Cognition    Orientation Status (Cognition) oriented x 3  -     Row Name 12/01/21 1652          Safety Issues, Functional Mobility    Safety Issues Affecting Function (Mobility) insight into deficits/self-awareness; judgment  -MP     Impairments Affecting Function (Mobility) postural/trunk control; balance; endurance/activity tolerance; range of motion (ROM); strength; pain  -           User Key  (r) = Recorded By, (t) = Taken By, (c) = Cosigned By    Initials Name Provider Type     Ramo Garcia, LUCILLE Occupational Therapist                 Mobility/ADL's     Row Name 12/01/21 1653          Transfers    Transfers sit-stand transfer  -     Sit-Stand [place] (Transfers) minimum assist (75% patient effort); 1 person assist  -     Row Name 12/01/21 1653          Sit-Stand Transfer    Assistive Device (Sit-Stand Transfers) walker, front-wheeled  -     Row Name 12/01/21 1653          Activities of Daily Living    BADL Assessment/Intervention lower body dressing  -     Row Name 12/01/21 1653          Mobility    Extremity Weight-bearing Status right lower extremity  -     Right Lower Extremity (Weight-bearing Status) weight-bearing as tolerated (WBAT)  -     Row Name 12/01/21 1653          Lower Body Dressing Assessment/Training    [place] Level (Lower Body Dressing) don; doff; socks; maximum assist (25% patient effort)  -            User Key  (r) = Recorded By, (t) = Taken By, (c) = Cosigned By    Initials Name Provider Type    Ramo Veras OT Occupational Therapist               Obj/Interventions     Row Name 12/01/21 1701          Range of Motion Comprehensive    Comment, General Range of Motion BUE WFL  -MP     Row Name 12/01/21 1701          Strength Comprehensive (MMT)    Comment, General Manual Muscle Testing (MMT) Assessment BUE 4-/5  -MP     Row Name 12/01/21 1701          Balance    Static Standing Balance mild impairment; standing; supported  -MP     Dynamic Standing Balance moderate impairment; standing; supported  -MP     Balance Interventions sitting; standing; sit to stand; supported; static; dynamic  -MP           User Key  (r) = Recorded By, (t) = Taken By, (c) = Cosigned By    Initials Name Provider Type    Ramo Veras OT Occupational Therapist               Goals/Plan     Row Name 12/01/21 1704          Bed Mobility Goal 1 (OT)    Activity/Assistive Device (Bed Mobility Goal 1, OT) bed mobility activities, all  -MP     [place] Level/Cues Needed (Bed Mobility Goal 1, OT) minimum assist (75% or more patient effort)  -MP     Time Frame (Bed Mobility Goal 1, OT) long term goal (LTG); 2 weeks  -MP     Row Name 12/01/21 1704          Transfer Goal 1 (OT)    Activity/Assistive Device (Transfer Goal 1, OT) sit-to-stand/stand-to-sit; toilet  -MP     [place] Level/Cues Needed (Transfer Goal 1, OT) minimum assist (75% or more patient effort)  -MP     Time Frame (Transfer Goal 1, OT) long term goal (LTG); 2 weeks  -MP     Row Name 12/01/21 1704          Bathing Goal 1 (OT)    Activity/Device (Bathing Goal 1, OT) lower body bathing  -MP     [place] Level/Cues Needed (Bathing Goal 1, OT) minimum assist (75% or more patient effort)  -MP     Time Frame (Bathing Goal 1, OT) long term goal (LTG); 2 weeks  -MP           User Key  (r) = Recorded By, (t) = Taken By, (c) = Cosigned By    Initials Name  Provider Type    Ramo Veras OT Occupational Therapist               Clinical Impression     Row Name 12/01/21 1701          Pain Scale: FACES Pre/Post-Treatment    Pain: FACES Scale, Pretreatment 2-->hurts little bit  -MP     Posttreatment Pain Rating 4-->hurts little more  -MP     Pre/Posttreatment Pain Comment R hip  -MP     Row Name 12/01/21 1701          Plan of Care Review    Plan of Care Reviewed With patient  -MP     Progress no change  -MP     Outcome Summary Pt is 87 yo male s/p right aTHA by Dr Hickman on 11/30/2021. Pt. states he lives at home w/ spouse, typically maintains I/ADL independence at baseline. Pt. requires min A for functional transfers this date, unsteady w/ dynamic standing balance + rolling walker support. Pt. requires max A for all LB ADLs secondary to hip pain w/ decreased AROM. Recommend IP rehab at d/c to address aforementioned deficits, will follow up w/ pt. 1-3x per week at [facility].  -MP     Row Name 12/01/21 1701          Therapy Assessment/Plan (OT)    Rehab Potential (OT) good, to achieve stated therapy goals  -MP     Criteria for Skilled Therapeutic Interventions Met (OT) yes; skilled treatment is necessary  -MP     Therapy Frequency (OT) 3 times/wk  -MP     Row Name 12/01/21 1701          Therapy Plan Review/Discharge Plan (OT)    Anticipated Discharge Disposition (OT) inpatient rehabilitation facility  -MP     Row Name 12/01/21 1701          Vital Signs    Pre Patient Position Sitting  -MP     Intra Patient Position Standing  -MP     Post Patient Position Sitting  -MP     Row Name 12/01/21 1701          Positioning and Restraints    Pre-Treatment Position sitting in chair/recliner  -MP     Post Treatment Position chair  -MP     In Chair call light within reach; sitting; encouraged to call for assist; exit alarm on  -MP           User Key  (r) = Recorded By, (t) = Taken By, (c) = Cosigned By    Initials Name Provider Type    Ramo Veras OT Occupational Therapist                Outcome Measures    No documentation.                 Occupational Therapy Education                 Title: PT OT SLP Therapies (In Progress)     Topic: Occupational Therapy (In Progress)     Point: ADL training (Not Started)     Description:   Instruct learner(s) on proper safety adaptation and remediation techniques during self care or transfers.   Instruct in proper use of assistive devices.              Learner Progress:  Not documented in this visit.          Point: Home exercise program (Not Started)     Description:   Instruct learner(s) on appropriate technique for monitoring, assisting and/or progressing therapeutic exercises/activities.              Learner Progress:  Not documented in this visit.          Point: Precautions (Not Started)     Description:   Instruct learner(s) on prescribed precautions during self-care and functional transfers.              Learner Progress:  Not documented in this visit.          Point: Body mechanics (Done)     Description:   Instruct learner(s) on proper positioning and spine alignment during self-care, functional mobility activities and/or exercises.              Learning Progress Summary           Patient Acceptance, E,TB, VU by FALLON at 12/1/2021 1704                               User Key     Initials Effective Dates Name Provider Type Discipline    FALLON 06/16/21 -  Ramo Garcia OT Occupational Therapist OT              OT Recommendation and Plan  Therapy Frequency (OT): 3 times/wk  Plan of Care Review  Plan of Care Reviewed With: patient  Progress: no change  Outcome Summary: Pt is 85 yo male s/p right aTHA by Dr Hickman on 11/30/2021. Pt. states he lives at home w/ spouse, typically maintains I/ADL independence at baseline. Pt. requires min A for functional transfers this date, unsteady w/ dynamic standing balance + rolling walker support. Pt. requires max A for all LB ADLs secondary to hip pain w/ decreased AROM. Recommend IP rehab at d/c to address  aforementioned deficits, will follow up w/ pt. 1-3x per week at [facility].     Time Calculation:    Time Calculation- OT     Row Name 12/01/21 1704             Time Calculation- OT    OT Start Time 1155  -MP      OT Stop Time 1210  -      OT Time Calculation (min) 15 min  -      Total Timed Code Minutes- OT 0 minute(s)  -      OT Received On 12/01/21  -      OT - Next Appointment 12/03/21  -      OT Goal Re-Cert Due Date 12/15/21  -            User Key  (r) = Recorded By, (t) = Taken By, (c) = Cosigned By    Initials Name Provider Type    Ramo Veras OT Occupational Therapist              Therapy Charges for Today     Code Description Service Date Service Provider Modifiers Qty    [id number] HC OT EVAL LOW COMPLEXITY 3 12/1/2021 Ramo Garcia OT GO 1               Ramo Garcia OT  12/1/2021

## 2021-12-01 NOTE — PLAN OF CARE
Goal Outcome Evaluation:  Plan of Care Reviewed With: patient        Progress: no change  Outcome Summary: Pt is 87 yo male s/p right aTHA by Dr Hickman on 11/30/2021. Pt. states he lives at home w/ spouse, typically maintains I/ADL independence at baseline. Pt. requires min A for functional transfers this date, unsteady w/ dynamic standing balance + rolling walker support. Pt. requires max A for all LB ADLs secondary to hip pain w/ decreased AROM. Recommend IP rehab at d/c to address aforementioned deficits, will follow up w/ pt. 1-3x per week at [facility].

## 2021-12-01 NOTE — CONSULTS
[facility] CONSULT      Patient Identification:  NAME:  Junior MONI Robles  Age:  86 y.o.   Sex:  male   :  1935   MRN:  [id number]       Chief complaint/Reason for consult: Hesitancy    History of present illness:  86 y.o. male underwent hip surgery postoperatively developed urinary hesitancy.  His bladder scans have been in the one hundreds.  Overall pressure and symptoms are slightly improved now.  No hematuria.  Patient sees Dr. Drew and had resume therapy in 2019, has not followed up since then.      Past medical history:  Past Medical History:   Diagnosis Date   • Atrial fibrillation (HCC) 2015    new onset   • AVNRT (AV roc re-entry tachycardia) ([facility]) 2018    AVNRT Ablation   • Dubose esophagus    • CAD (coronary artery disease)     S/P CABG and PCI   • Diverticulitis    • Gastroesophageal reflux disease    • Hyperlipidemia    • Hypertension    • Myocardial infarction ([facility])     anterior   • Paroxysmal atrial fibrillation (HCC)     havent been in afib   • Pneumonia    • SSS (sick sinus syndrome) ([facility])     SSS, Toby, tachy; pacemaker implantation/ [employer]       Past surgical history:  Past Surgical History:   Procedure Laterality Date   • CARDIAC CATHETERIZATION      2001:2003; 2007:2010:[facility] 12-15-15/ unsuccessful attempt PCI of obtuse marginal branch of circumflex. represents failed 2nd attempt of PCI of this vessel.   • COLON SURGERY      9 inches on colon removed   • COLONOSCOPY     • CORONARY ANGIOPLASTY     • CORONARY ARTERY BYPASS GRAFT  08/15/2001    x4   • CORONARY STENT PLACEMENT  2007    LCX    • HERNIA REPAIR Right    • HERNIA REPAIR Left    • INSERT / REPLACE / REMOVE PACEMAKER  2016    [facility] [employer] pacemaker implantation   • PROSTATE SURGERY  2019    REZUM: prostate   • ROTATOR CUFF REPAIR Left    • TOTAL HIP ARTHROPLASTY Left 2018    ATHR Dr. Smith   • VENTRICULAR ABLATION SURGERY  2018    SVT ablation [facility]        Allergies:  Valsartan, Atorvastatin calcium, and Codeine    Home medications:  Medications Prior to Admission   Medication Sig Dispense Refill Last Dose   • amLODIPine (NORVASC) 2.5 MG tablet Take 2.5 mg by mouth Daily. Take preop   11/30/2021 at Unknown time   • Cholecalciferol (VITAMIN D-1000 MAX ST) 25 MCG (1000 UT) tablet Take 2,000 Units by mouth Daily. Last dose 11/23   Past Week at Unknown time   • clopidogrel (PLAVIX) 75 MG tablet TAKE 1 TABLET EVERY DAY 90 tablet 3 Past Week at Unknown time   • docusate sodium (COLACE) 100 MG capsule Take 200 mg by mouth Daily. dont take preop   Past Week at Unknown time   • GUAIFENESIN PO Take 400 mg by mouth Daily. Last dose 11/29   Past Week at Unknown time   • pantoprazole (PROTONIX) 40 MG EC tablet Take 40 mg by mouth Every Night.   11/29/2021 at Unknown time   • polycarbophil 625 MG tablet tablet Take 1,250 mg by mouth Daily. dont take preop    Past Week at Unknown time   • rosuvastatin (CRESTOR) 20 MG tablet Take 20 mg by mouth Every Night.   Past Week at Unknown time   • warfarin (COUMADIN) 5 MG tablet Take 5 mg by mouth. Everyday EXCEPT FRIDAY   Past Week at Unknown time   • warfarin (COUMADIN) 7.5 MG tablet Take 7.5 mg by mouth 1 (One) Time Per Week. Friday   Past Week at Unknown time        Hospital medications:  acetaminophen, 1,000 mg, Oral, Q8H  amLODIPine, 2.5 mg, Oral, Daily  clopidogrel, 75 mg, Oral, Daily  docusate sodium, 200 mg, Oral, Daily  ferrous sulfate, 324 mg, Oral, Daily With Breakfast  gabapentin, 100 mg, Oral, Nightly  guaiFENesin, 400 mg, Oral, Daily  meloxicam, 15 mg, Oral, Daily  Nutrisource fiber, 1 packet, Oral, Daily  pantoprazole, 40 mg, Oral, Nightly  polyethylene glycol, 17 g, Oral, Daily  rosuvastatin, 20 mg, Oral, Nightly  tamsulosin, 0.4 mg, Oral, Daily  warfarin, 5 mg, Oral, Once per day on Sun Mon Tue Wed Thu Sat  [START ON 12/3/2021] warfarin, 7.5 mg, Oral, Once per day on Fri      sodium chloride, 100 mL/hr, Last Rate: 100  mL/hr (21 7682)      diphenhydrAMINE **OR** diphenhydrAMINE  •  Morphine **AND** naloxone  •  ondansetron **OR** ondansetron  •  oxyCODONE  •  oxyCODONE  •  promethazine  •  traMADol  •  zolpidem    Family history:  Family History   Problem Relation Age of Onset   • Stomach cancer Mother    • Heart disease Father         leaky valve       Social history:  Social History     Tobacco Use   • Smoking status: Current Some Day Smoker     Types: Cigars   • Smokeless tobacco: Never Used   • Tobacco comment: 2  cigars  per day   Vaping Use   • Vaping Use: Never used   Substance Use Topics   • Alcohol use: No   • Drug use: No       REVIEW OF SYSTEMS:  Constitutional - Negative for fevers, chills, lethargy  Eyes/Ears/Nose/Mouth/Throat - Negative for changes in vision or hearing  Cardiovascular - Negative for increased edema or cyanosis  Respiratory - Negative for dyspnea or wheezing  Gastrointestinal - Negative for nausea or vomiting  Genitourinary - Negative for dysuria or hematuria  Hematologic/Lymphatic - Negative for bruising or cyanosis  Skin - Negative for erythema or rash  Psych - Negative     Objective:  TMax 24 hours:   Temp (24hrs), Av.5 °F (36.9 °C), Min:97.4 °F (36.3 °C), Max:99.9 °F (37.7 °C)      Vitals Ranges:   Temp:  [97.4 °F (36.3 °C)-99.9 °F (37.7 °C)] 98.9 °F (37.2 °C)  Heart Rate:  [72-92] 78  Resp:  [13-22] 16  BP: ()/(43-67) 123/57    Intake/Output Last 3 shifts:  I/O last 3 completed shifts:  In: 3252 [I.V.:3252]  Out: 325 [Urine:175; Blood:150]     Physical Exam:    General Appearance:    Alert, cooperative, NAD   HEENT:    No trauma, pupils reactive, hearing intact   Lungs:     Respirations unlabored, no audible wheezing    Heart:    No cyanosis, No significant edema   Abdomen:     Soft, ND    :    No suprapubic distention   Extremities:   No significant edema, no deformity   Lymphatic:   No neck or groin LAD   Skin:   No bleeding, bruising or rashes   Neuro/Psych:   Orientation  intact, mood/affect pleasant, no focal findings       Results review:   I reviewed the patient's new clinical results.    Data review:  Lab Results (last 24 hours)     Procedure Component Value Units Date/Time    Hemoglobin & Hematocrit, Blood [[id number]]  (Abnormal) Collected: 12/01/21 1037    Specimen: Blood Updated: 12/01/21 1048     Hemoglobin 7.4 g/dL      Hematocrit 21.9 %     Basic Metabolic Panel [[id number]]  (Abnormal) Collected: 12/01/21 0243    Specimen: Blood Updated: 12/01/21 0355     Glucose 130 mg/dL      BUN 21 mg/dL      Creatinine 1.21 mg/dL      Sodium 135 mmol/L      Potassium 4.4 mmol/L      Chloride 101 mmol/L      CO2 25.0 mmol/L      Calcium 7.5 mg/dL      eGFR Non African Amer 57 mL/min/1.73      BUN/Creatinine Ratio 17.4     Anion Gap 9.0 mmol/L     Narrative:      GFR Normal >60  Chronic Kidney Disease <60  Kidney Failure <15      Protime-INR [[id number]]  (Abnormal) Collected: 12/01/21 0243    Specimen: Blood Updated: 12/01/21 0355     Protime 12.2 Seconds      INR 1.11    Hemoglobin & Hematocrit, Blood [[id number]]  (Abnormal) Collected: 12/01/21 0243    Specimen: Blood Updated: 12/01/21 0342     Hemoglobin 7.4 g/dL      Hematocrit 21.1 %     Protime-INR [[id number]]  (Abnormal) Collected: 11/30/21 1638    Specimen: Blood Updated: 11/30/21 1711     Protime 12.4 Seconds      INR 1.13           Imaging:  Imaging Results (Last 24 Hours)     ** No results found for the last 24 hours. **             Assessment:       Primary osteoarthritis of right hip    Status post total hip replacement, left    Urinary hesitancy  Incomplete emptying  BPH with history of rezum 2019    Plan:     Start Flomax  Continue to monitor residuals, no Hayes in at this time  Plan cystoscopy in the morning    Josiah Hameed MD  [facility]  [address], [address]  [place] [address]  [phone]  12/01/21  14:42 EST

## 2021-12-01 NOTE — PLAN OF CARE
Goal Outcome Evaluation:      Pt up to chair, worked with PT and OT today, tolerated well. Pt hemoglobin 7.4 received 1 unit PRBC tolerated well. VSS. Pt complained of hesitancy while urinating, urine output clear yellow >30cc/hr, urology consulted Dr. Hameed will start on flomax today and possible cystoscopy at bedside in AM, will obtain consent and continue to montior for pain or difficulty voiding.

## 2021-12-01 NOTE — NURSING NOTE
"Pt had not had adequate output for the night so we bladder scanned him and told him he needed to try to pee. Pt then started making comments about how he was going to die if he could not pee. After he voided I went in to ask him if he wanted to get up for the day he states \"If I am going to die I should do it laying down.\"  "

## 2021-12-01 NOTE — PROGRESS NOTES
Orthopedic Progress Note        Patient: Junior MONI Robles    Date of Admission: 11/30/2021  7:22 AM    YOB: 1935    Medical Record Number: [id number]    Attending Physician: Frantz Hickman MD      Subjective: Patient sitting up in chair, pain is currently controlled.  Patient reports some urinary symptoms with hesitancy and incomplete emptying.  Activity: Mobilizing Per P.T.        Allergies   Allergen Reactions   • Valsartan Other (See Comments)     Weakness, loss of appetite    • Atorvastatin Calcium Myalgia   • Codeine Other (See Comments)     Lightheaded        Current Medications:  Scheduled Meds:acetaminophen, 1,000 mg, Oral, Q8H  amLODIPine, 2.5 mg, Oral, Daily  clopidogrel, 75 mg, Oral, Daily  docusate sodium, 200 mg, Oral, Daily  ferrous sulfate, 324 mg, Oral, Daily With Breakfast  gabapentin, 100 mg, Oral, Nightly  guaiFENesin, 400 mg, Oral, Daily  meloxicam, 15 mg, Oral, Daily  Nutrisource fiber, 1 packet, Oral, Daily  pantoprazole, 40 mg, Oral, Nightly  polyethylene glycol, 17 g, Oral, Daily  rosuvastatin, 20 mg, Oral, Nightly  warfarin, 5 mg, Oral, Once per day on Sun Mon Tue Wed Thu Sat  [START ON 12/3/2021] warfarin, 7.5 mg, Oral, Once per day on Fri      Continuous Infusions:sodium chloride, 100 mL/hr, Last Rate: 100 mL/hr (12/01/21 0432)      PRN Meds:.diphenhydrAMINE **OR** diphenhydrAMINE  •  Morphine **AND** naloxone  •  ondansetron **OR** ondansetron  •  oxyCODONE  •  oxyCODONE  •  promethazine  •  traMADol  •  zolpidem      Physical Exam: 86 y.o. male  Vitals:    12/01/21 0830 12/01/21 1110 12/01/21 1130 12/01/21 1206   BP: 156/63 118/67 115/43 123/57   BP Location: Left arm Left arm     Patient Position: Lying Sitting     Pulse: 92 77 90 78   Resp: 16 16 16 16   Temp: 99.9 °F (37.7 °C) 98.8 °F (37.1 °C) 99 °F (37.2 °C) 98.9 °F (37.2 °C)   TempSrc: Oral Oral Oral Oral   SpO2: 90% 93% 93% 93%   Weight:       Height:            General Appearance alert, pleasant, appears stated age,  interactive, and cooperative  Extremities right lower extremity is grossly well aligned and perfused, sensation intact, dressing clean, dry, and intact, plantar and dorsiflexion intact  Neurologic Mental Status orientated to person, place, time and situation             Diagnostic Tests:   Admission on 11/30/2021   Component Date Value Ref Range Status   • Protime 11/30/2021 11.9* 19.4 - 28.5 Seconds Final   • INR 11/30/2021 1.08* 2.00 - 3.00 Final   • PTT 11/30/2021 28.4  24.0 - 31.0 seconds Final   • Hemoglobin A1C 11/30/2021 5.5  3.5 - 5.6 % Final   • RBC, UA 11/30/2021 None Seen  None Seen /HPF Final   • WBC, UA 11/30/2021 None Seen  None Seen /HPF Final   • Bacteria, UA 11/30/2021 None Seen  None Seen /HPF Final   • Squamous Epithelial Cells, UA 11/30/2021 None Seen  None Seen, 0-2 /HPF Final   • Hyaline Casts, UA 11/30/2021 None Seen  None Seen /LPF Final   • Methodology 11/30/2021 Automated Microscopy   Final   • Protime 11/30/2021 12.4* 19.4 - 28.5 Seconds Final   • INR 11/30/2021 1.13* 2.00 - 3.00 Final   • Protime 12/01/2021 12.2* 19.4 - 28.5 Seconds Final   • INR 12/01/2021 1.11* 2.00 - 3.00 Final   • Glucose 12/01/2021 130* 65 - 99 mg/dL Final   • BUN 12/01/2021 21  8 - 23 mg/dL Final   • Creatinine 12/01/2021 1.21  0.76 - 1.27 mg/dL Final   • Sodium 12/01/2021 135* 136 - 145 mmol/L Final   • Potassium 12/01/2021 4.4  3.5 - 5.2 mmol/L Final   • Chloride 12/01/2021 101  98 - 107 mmol/L Final   • CO2 12/01/2021 25.0  22.0 - 29.0 mmol/L Final   • Calcium 12/01/2021 7.5* 8.6 - 10.5 mg/dL Final   • eGFR Non African Amer 12/01/2021 57* >60 mL/min/1.73 Final   • BUN/Creatinine Ratio 12/01/2021 17.4  7.0 - 25.0 Final   • Anion Gap 12/01/2021 9.0  5.0 - 15.0 mmol/L Final   • Hemoglobin 12/01/2021 7.4* 13.0 - 17.7 g/dL Final   • Hematocrit 12/01/2021 21.1* 37.5 - 51.0 % Final   • Product Code 12/01/2021 [id number]   Final   • Unit Number 12/01/2021 [id number]   Final   • UNIT  ABO 12/01/2021 O   Final   • UNIT   RH 12/01/2021 POS   Final   • Crossmatch Interpretation 12/01/2021 Compatible   Final   • Dispense Status 12/01/2021 IS   Final   • Blood Expiration Date 12/01/2021 [id number]   Final   • Blood Type Barcode 12/01/2021 5100   Final   • Hemoglobin 12/01/2021 7.4* 13.0 - 17.7 g/dL Final   • Hematocrit 12/01/2021 21.9* 37.5 - 51.0 % Final       No results found.    Assessment:  POD # 1     Status post- right  Procedure(s):  TOTAL HIP ARTHROPLASTY ANTERIOR WITH HANA TABLE  -------------------      Acute Blood Loss Anemia  Post-operative Pain  Patient Active Problem List   Diagnosis   • Sick sinus syndrome (HCC)   • Atrial flutter (HCC)   • Presence of cardiac pacemaker   • CAD (coronary artery disease)   • Myocardial infarction (HCC)   • Atrial fibrillation (HCC)   • Status post total hip replacement, left   • Primary osteoarthritis of right hip        Plan:    Weight Bearing: WBAT  Patient is currently receiving 1 unit of blood, will continue to monitor H&H  Urology consult  Continue efforts to mobilize/ PT  Continue Pain Control Measures  Continue DVT Prophylaxis  Discharge planning      Discharge Plan: tomorrow to home health    Date: 12/1/2021   Time: 12:39 POONAM Hinson Dragon/Transcription disclaimer:  Much of this encounter note is an electronic transcription/translation of spoken language to printed text. The electronic translation of spoken language may permit erroneous, or at times, nonsensical words or phrases to be inadvertently transcribed; Although I have reviewed the note for such errors, some may still exist.

## 2021-12-01 NOTE — CASE MANAGEMENT/SOCIAL WORK
Discharge Planning Assessment   Aly     Patient Name: Junior MONI Robles  MRN: [id number]  Today's Date: 12/1/2021    Admit Date: 11/30/2021     Discharge Needs Assessment     Row Name 12/01/21 1411       Living Environment    Lives With spouse; grandchild(mony)    Current Living Arrangements home/apartment/condo    Primary Care Provided by self    Provides Primary Care For no one    Family Caregiver if Needed none    Able to Return to Prior Arrangements yes       Resource/Environmental Concerns    Resource/Environmental Concerns home accessibility    Home Accessibility Concerns stairs to enter home    Transportation Concerns car, none       Transition Planning    Patient/Family Anticipated Services at Transition rehabilitation services    Transportation Anticipated health plan transportation       Discharge Needs Assessment    Equipment Currently Used at Home none    Concerns to be Addressed discharge planning    Anticipated Changes Related to Illness inability to care for self    Equipment Needed After Discharge none    Outpatient/Agency/Support Group Needs skilled nursing facility; inpatient rehabilitation facility    Provided Post Acute Provider List? Yes    Post Acute Provider List Nursing Home; Inpatient Rehab    Provided Post Acute Provider Quality & Resource List? Yes    Post Acute Provider Quality and Resource List Inpatient Rehab; Nursing Home    Delivered To Patient    Method of Delivery In person               Discharge Plan     Row Name 12/01/21 1412       Plan    Plan Referral to [facility] pending acceptance. Will need precert    Plan Comments Spoke to patient at bedside. Gave list of rehabs and chose [facility]. Referral made and dcp sent.              Continued Care and Services - Admitted Since 11/30/2021     Destination     Service Provider Request Status Selected Services Address Phone Fax Patient Preferred    [facility]  Pending - Request Sent N/A [id number] [facility] [place]  [phone] --              Expected Discharge Date and Time     Expected Discharge Date Expected Discharge Time    Dec 3, 2021          Demographic Summary     Row Name 12/01/21 1410       General Information    Admission Type inpatient    Arrived From PACU/recovery room    Required Notices Provided Important Message from Medicare    Referral Source admission list    Reason for Consult discharge planning    Preferred Language English               Functional Status     Row Name 12/01/21 1410       Functional Status    Usual Activity Tolerance moderate    Current Activity Tolerance fair       Functional Status, IADL    Medications independent    Meal Preparation independent    Housekeeping independent    Laundry independent    Shopping independent       Mental Status    General Appearance WDL WDL       Mental Status Summary    Recent Changes in Mental Status/Cognitive Functioning no changes               Psychosocial    No documentation.                Abuse/Neglect    No documentation.                Legal    No documentation.                Substance Abuse    No documentation.                Patient Forms     Row Name 12/01/21 1340       Patient Forms    Important Message from Medicare (IMM) Attempted    Details of attempted delivery Called patient room, no answer.                  Eugenia Camargo RN

## 2021-12-01 NOTE — PLAN OF CARE
Assessment: Junior MONI Robles presents with functional mobility impairments which indicate the need for skilled intervention. Pt requiring assist for RLE during bed mobility transition to EOB. Min a required to come to stand at EOB with bed elevated. Pt demo's R hip/knee flex in standing and required VC for placing heel on floor for improved LE alignment. Continues to demo knee flex in standing but able to complete lateral weight shifting. Pt amb within room with FWW and demo's R hip/knee flex throughout. Pt demo's guarding of RLE throughout session. Tolerating session today without incident. Will continue to follow and progress as tolerated.     Plan/Recommendations:   Pt would benefit from Inpatient Rehabilitation placement at discharge from facility and requires no DME at discharge.   Pt desires Inpatient Rehabilitation placement at discharge. Pt cooperative; agreeable to therapeutic recommendations and plan of care.

## 2021-12-01 NOTE — CONSULTS
Nutrition Services    Patient Name: Junior MONI Robles  YOB: 1935  MRN: [id number]  Admission date: 11/30/2021    Comment:  - Vanilla Boost Plus BID (Provides 720 kcals, 28 g protein if consumed)     - Moderate chronic disease related malnutrition related to past medical history including CAD, a-fib and history of heart attack as evidenced by fat/muscle wasting per physical exam.  See MSA below.    PPE Documentation        PPE Worn By Provider mask and eye protection   PPE Worn By Patient  None     CLINICAL NUTRITION ASSESSMENT      Reason for Assessment 12/1: Low BMI     H&P  Left hip replacement 4/2018.  He has a lot of pain in the right hip over the past 4 to 6 months..  Symptoms are associated with pain and a limp on the right side.  Symptoms are aggravated by going up and down the steps and deep flexion of the hip.   Symptoms improve with using a cane for assistance with ambulation.. Patient is now being admitted to the services of Frantz Hickman MD for further evaluation and treatment.     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    AVNRT Ablation   • Dubose esophagus    • CAD (coronary artery disease)     S/P CABG and PCI   • Diverticulitis    • Gastroesophageal reflux disease    • Hyperlipidemia    • Hypertension    • Myocardial infarction (HCC)     anterior   • Paroxysmal atrial fibrillation (HCC)     havent been in afib   • Pneumonia    • SSS (sick sinus syndrome) ([facility])     SSS, Toby, tachy; pacemaker implantation/ [employer]       Past Surgical History:   Procedure Laterality Date   • CARDIAC CATHETERIZATION      2001:2003; 2007:2010:[facility] 12-15-15/ unsuccessful attempt PCI of obtuse marginal branch of circumflex. represents failed 2nd attempt of PCI of this vessel.   • COLON SURGERY      9 inches on colon removed   • COLONOSCOPY     • CORONARY ANGIOPLASTY     • CORONARY ARTERY  "BYPASS GRAFT  08/15/2001    x4   • CORONARY STENT PLACEMENT  02/25/2007    LCX    • HERNIA REPAIR Right    • HERNIA REPAIR Left    • INSERT / REPLACE / REMOVE PACEMAKER  08/05/2016    [facility] [employer] pacemaker implantation   • PROSTATE SURGERY  03/31/2019    REZUM: prostate   • ROTATOR CUFF REPAIR Left    • TOTAL HIP ARTHROPLASTY Left 04/04/2018    [facility] Dr. Smith   • VENTRICULAR ABLATION SURGERY  01/2018    SVT ablation F        Current Problems          Encounter Information        Trending Narrative     12/1: RD visited patient at bedside.  Patient states decreased appetite, nausea yesterday but resolved today.  Willing to get Vanilla Boost Plus.       Anthropometrics        Current Height, Weight Height: 182.9 cm (72\")  Weight: 61.4 kg (135 lb 5.8 oz) (11/30/21 0837)       Ideal Body Weight (IBW) 181#   Usual Body Weight (UBW) 140# per patient        Trending Weight Hx     This admission: 12/1: 135#             PTA: 3.6% weight loss x 2 months  9.4% weight loss x 6 months  6.8% weight loss x 1 year    Wt Readings from Last 30 Encounters:   11/30/21 0837 61.4 kg (135 lb 5.8 oz)   11/22/21 1528 59 kg (130 lb)   10/20/21 0822 64 kg (141 lb)   10/06/21 1117 63.5 kg (140 lb)   09/20/21 1120 63.5 kg (140 lb)   07/02/21 0851 63 kg (139 lb)   05/26/21 1124 67.6 kg (149 lb)   04/20/21 0919 67.6 kg (149 lb)   12/14/20 0819 65.8 kg (145 lb)   10/20/20 0850 64.9 kg (143 lb)   06/05/20 0914 66.8 kg (147 lb 3.2 oz)   04/20/20 0938 67.1 kg (148 lb)   12/02/19 1019 67.1 kg (148 lb)   11/19/19 1027 67 kg (147 lb 12.8 oz)   05/10/19 1015 64 kg (141 lb 3.2 oz)   09/24/18 0856 63.2 kg (139 lb 6.4 oz)   08/24/18 0958 60.8 kg (134 lb)   06/29/18 1022 61.1 kg (134 lb 12.8 oz)   06/08/18 1016 62.1 kg (136 lb 12.8 oz)   05/31/18 1013 61.7 kg (136 lb)   04/19/18 1047 61.9 kg (136 lb 6.4 oz)   01/26/18 1145 64.7 kg (142 lb 9.6 oz)   12/21/17 1421 64 kg (141 lb 3.2 oz)   12/15/17 1046 66.7 kg (147 lb)   08/02/17 0923 67.9 kg (149 lb " 9.6 oz)   01/04/17 0930 69.4 kg (153 lb)   11/03/16 1524 69.4 kg (153 lb)   09/23/16 0934 70.5 kg (155 lb 8 oz)   05/03/16 1107 72 kg (158 lb 11.2 oz)   02/02/16 1036 71.1 kg (156 lb 11.2 oz)   01/05/16 1412 68.3 kg (150 lb 8 oz)      BMI kg/m2 Body mass index is 18.36 kg/m².       Labs        Pertinent Labs    Results from last 7 days   Lab Units 12/01/21  0243   SODIUM mmol/L 135*   POTASSIUM mmol/L 4.4   CHLORIDE mmol/L 101   CO2 mmol/L 25.0   BUN mg/dL 21   CREATININE mg/dL 1.21   CALCIUM mg/dL 7.5*   GLUCOSE mg/dL 130*     Results from last 7 days   Lab Units 12/01/21  1037   HEMOGLOBIN g/dL 7.4*   HEMATOCRIT % 21.9*     COVID19   Date Value Ref Range Status   11/27/2021 Not Detected Not Detected - Ref. Range Final     Lab Results   Component Value Date    HGBA1C 5.5 11/30/2021        Medications    Scheduled Medications acetaminophen, 1,000 mg, Oral, Q8H  amLODIPine, 2.5 mg, Oral, Daily  clopidogrel, 75 mg, Oral, Daily  docusate sodium, 200 mg, Oral, Daily  ferrous sulfate, 324 mg, Oral, Daily With Breakfast  gabapentin, 100 mg, Oral, Nightly  guaiFENesin, 400 mg, Oral, Daily  meloxicam, 15 mg, Oral, Daily  Nutrisource fiber, 1 packet, Oral, Daily  pantoprazole, 40 mg, Oral, Nightly  polyethylene glycol, 17 g, Oral, Daily  rosuvastatin, 20 mg, Oral, Nightly  warfarin, 5 mg, Oral, Once per day on Sun Mon Tue Wed Thu Sat  [START ON 12/3/2021] warfarin, 7.5 mg, Oral, Once per day on Fri        Infusions sodium chloride, 100 mL/hr, Last Rate: 100 mL/hr (12/01/21 0432)        PRN Medications diphenhydrAMINE **OR** diphenhydrAMINE  •  Morphine **AND** naloxone  •  ondansetron **OR** ondansetron  •  oxyCODONE  •  oxyCODONE  •  promethazine  •  traMADol  •  zolpidem     Physical Findings        Trending Physical   Appearance, NFPE 12/1: NFPE completed and consistent with malnutrition at this time.  See MSA below.   --  Edema  No edema documented      Bowel Function No BM since admission (x 1 day)     Tubes No feeding  tube     Chewing/Swallowing No issues per patient      Skin Intact     --  Current Nutrition Orders & Evaluation of Intake       Oral Nutrition     Food Allergies NKFA   Current PO Diet Diet Regular   Supplement None ordered    PO Evaluation     Trending % PO Intake 12/1: 75% x 1 meal documented thus far this admission   --  Nutritional Risk Screening        NRS-2002 Score          Nutrition Diagnosis         Nutrition Dx Problem 1 Moderate chronic disease related malnutrition related to past medical history including CAD, a-fib and history of heart attack as evidenced by fat/muscle wasting per physical exam.    Nutrition Dx Problem 2        Intervention Goal         Intervention Goal(s) Accept ONS  PO intakes greater than 50%     Nutrition Intervention        RD Action Order Boost     Nutrition Prescription          Diet Prescription Regular   Supplement Prescription Vanilla Boost BID   --  Monitor/Evaluation        Monitor I&O, PO intake, Weight, Skin status, POC/GOC     Malnutrition Severity Assessment      Patient meets criteria for : Moderate (non-severe) Malnutrition  Malnutrition Type (last 8 hours)     Malnutrition Severity Assessment     Row Name 12/01/21 1545       Malnutrition Severity Assessment    Malnutrition Type Chronic Disease - Related Malnutrition    Row Name 12/01/21 1544       Muscle Loss    Loss of Muscle Mass Findings Moderate    Orthodox Region Moderate - slight depression    Clavicle Bone Region Moderate - some protrusion in females, visible in males    Acromion Bone Region Moderate - acromion may slightly protrude    Dorsal Hand Region Moderate - slight depression    Patellar Region Moderate - patella more prominent, less muscle definition around patella    Anterior Thigh Region Moderate - mild depression on inner thigh    Posterior Calf Region Moderate - some roundness, slight firmness    Row Name 12/01/21 1677       Fat Loss    Subcutaneous Fat Loss Findings Moderate    Orbital Region   Moderate -  somewhat hollowness, slightly dark circles    Upper Arm Region Moderate - some fat tissue, not ample    Row Name 12/01/21 1545       Criteria Met (Must meet criteria for severity in at least 2 of these categories: M Wasting, Fat Loss, Fluid, Secondary Signs, Wt. Status, Intake)    Patient meets criteria for  Moderate (non-severe) Malnutrition                   Electronically signed by:  Lisa Delacruz RD  12/01/21 13:09 EST

## 2021-12-01 NOTE — THERAPY TREATMENT NOTE
Subjective: Pt agreeable to therapeutic plan of care.    Objective:     Bed mobility - N/A or Not attempted. Pt up in recliner  Transfers - N/A or Not attempted.  Ambulation - 0 feet N/A or Not attempted.   Seated Therapeutic Exercise: AP, heel slides, QS, GS    Pain: 5 VAS  Education: Provided education on importance of mobility and skilled verbal / tactile cueing throughout intervention.     Assessment: Junior MONI Robles presents with functional mobility impairments which indicate the need for skilled intervention. Pt receiving blood transfusion during pm session but agreeable to seated exercise in chair. Pt reports pain with mobility. Ice packs changed out and reapplied to reduce soreness.Tolerating session today without incident. Will continue to follow and progress as tolerated.     Plan/Recommendations:   Pt would benefit from Inpatient Rehabilitation placement at discharge from facility and requires no DME at discharge.   Pt desires Inpatient Rehabilitation placement at discharge. Pt cooperative; agreeable to therapeutic recommendations and plan of care.     Basic Mobility 6-click:  Rollin = Total, A lot = 2, A little = 3; 4 = None  Supine>Sit:   1 = Total, A lot = 2, A little = 3; 4 = None   Sit>Stand with arms:  1 = Total, A lot = 2, A little = 3; 4 = None  Bed>Chair:   1 = Total, A lot = 2, A little = 3; 4 = None  Ambulate in room:  1 = Total, A lot = 2, A little = 3; 4 = None  3-5 Steps with railin = Total, A lot = 2, A little = 3; 4 = None  Score: 16    Modified [place]: N/A = No pre-op stroke/TIA    Post-Tx Position: Up in Chair, Staff Present, Alarms activated and Call light and personal items within reach  PPE: gloves, surgical mask, eyewear protection

## 2021-12-01 NOTE — PLAN OF CARE
Goal Outcome Evaluation:           Progress: no change  Outcome Summary: During ambulation from chair to bed pt did well getting up and walking to bed. Once pt sat down on bed he needed more support to sit up and lots of help getting into the bed. Pt has been resting and c/o some pain. Rehab recommended

## 2021-12-01 NOTE — PLAN OF CARE
Assessment: Junior MONI Robles presents with functional mobility impairments which indicate the need for skilled intervention. Pt receiving blood transfusion during pm session but agreeable to seated exercise in chair. Pt reports pain with mobility. Ice packs changed out and reapplied to reduce soreness.Tolerating session today without incident. Will continue to follow and progress as tolerated.    Plan/Recommendations:   Pt would benefit from Inpatient Rehabilitation placement at discharge from facility and requires no DME at discharge.   Pt desires Inpatient Rehabilitation placement at discharge. Pt cooperative; agreeable to therapeutic recommendations and plan of care.

## 2021-12-01 NOTE — THERAPY TREATMENT NOTE
Subjective: Pt agreeable to therapeutic plan of care.    Objective:     Bed mobility - Min-A  Transfers - Min-A  Ambulation - 10 feet CGA and Min-A     WB'ing status: R Lower Extremity Weight Bearing As Tolerated    Therapeutic Exercise: 10 Reps R Lower Extremity AAROM AP, heel slides, QS, GS, lateral weight shifting    Precautions: High falls risk    Pain: 6 VAS  Education: Provided education on importance of mobility and skilled verbal / tactile cueing throughout intervention.     Assessment: Junior MONI Robles presents with functional mobility impairments which indicate the need for skilled intervention. Pt requiring assist for RLE during bed mobility transition to EOB. Min a required to come to stand at EOB with bed elevated. Pt demo's R hip/knee flex in standing and required VC for placing heel on floor for improved LE alignment. Continues to demo knee flex in standing but able to complete lateral weight shifting. Pt amb within room with FWW and demo's R hip/knee flex throughout. Pt demo's guarding of RLE throughout session. Tolerating session today without incident. Will continue to follow and progress as tolerated.     Plan/Recommendations:   Pt would benefit from Inpatient Rehabilitation placement at discharge from facility and requires no DME at discharge.   Pt desires Inpatient Rehabilitation placement at discharge. Pt cooperative; agreeable to therapeutic recommendations and plan of care.     Basic Mobility 6-click:  Rollin = Total, A lot = 2, A little = 3; 4 = None  Supine>Sit:   1 = Total, A lot = 2, A little = 3; 4 = None   Sit>Stand with arms:  1 = Total, A lot = 2, A little = 3; 4 = None  Bed>Chair:   1 = Total, A lot = 2, A little = 3; 4 = None  Ambulate in room:  1 = Total, A lot = 2, A little = 3; 4 = None  3-5 Steps with railin = Total, A lot = 2, A little = 3; 4 = None  Score: 16    Modified [place]: N/A = No pre-op stroke/TIA    Post-Tx Position: Up in Chair, Alarms activated and  Call light and personal items within reach  PPE: gloves, surgical mask, eyewear protection

## 2021-12-01 NOTE — DISCHARGE PLACEMENT REQUEST
"Junior MONI Robles (86 y.o. Male)             Date of Birth Social Security Number Address Home Phone MRN    1935  [address] [address] [phone] [id number]    Mu-ism Marital Status             Hindu        Admission Date Admission Type Admitting Provider Attending Provider Department, Room/Bed    11/30/21 Elective Frantz Hickman MD Mehta, Sanjiv, MD [facility] SURGICAL INPATIENT, 4129/1    Discharge Date Discharge Disposition Discharge Destination                         Attending Provider: Frantz Hickman MD    Allergies: Valsartan, Atorvastatin Calcium, Codeine    Isolation: None   Infection: None   Code Status: CPR   Advance Care Planning Activity    Ht: 182.9 cm (72\")   Wt: 61.4 kg (135 lb 5.8 oz)    Admission Cmt: None   Principal Problem: Primary osteoarthritis of right hip [M16.11]                 Active Insurance as of 11/30/2021     Primary Coverage     Payor Plan Insurance Group Employer/Plan Group    HUMANA MEDICARE REPLACEMENT HUMANA MEDICARE REPLACEMENT [id number]     Payor Plan Address Payor Plan Phone Number Payor Plan Fax Number Effective Dates    [address] [address] [phone]  1/1/2018 - None Entered    [facility] [address]       Subscriber Name Subscriber Birth Date Member ID       JUNIOR MONI ROBLES 1935 [id number]                 Emergency Contacts      (Rel.) Home Phone Work Phone Mobile Phone    EVGENY ROBLES (Spouse) [phone] -- [phone]          "

## 2021-12-02 LAB
ANION GAP SERPL CALCULATED.3IONS-SCNC: 8 MMOL/L (ref 5–15)
BASOPHILS # BLD AUTO: 0 10*3/MM3 (ref 0–0.2)
BASOPHILS NFR BLD AUTO: 0.1 % (ref 0–1.5)
BH BB BLOOD EXPIRATION DATE: NORMAL
BH BB BLOOD TYPE BARCODE: 5100
BH BB DISPENSE STATUS: NORMAL
BH BB PRODUCT CODE: NORMAL
BH BB UNIT NUMBER: NORMAL
BUN SERPL-MCNC: 22 MG/DL (ref 8–23)
BUN/CREAT SERPL: 17.2 (ref 7–25)
CALCIUM SPEC-SCNC: 7.5 MG/DL (ref 8.6–10.5)
CHLORIDE SERPL-SCNC: 102 MMOL/L (ref 98–107)
CO2 SERPL-SCNC: 24 MMOL/L (ref 22–29)
CREAT SERPL-MCNC: 1.28 MG/DL (ref 0.76–1.27)
CROSSMATCH INTERPRETATION: NORMAL
DEPRECATED RDW RBC AUTO: 59.9 FL (ref 37–54)
EOSINOPHIL # BLD AUTO: 0.1 10*3/MM3 (ref 0–0.4)
EOSINOPHIL NFR BLD AUTO: 0.5 % (ref 0.3–6.2)
ERYTHROCYTE [DISTWIDTH] IN BLOOD BY AUTOMATED COUNT: 18.1 % (ref 12.3–15.4)
GFR SERPL CREATININE-BSD FRML MDRD: 53 ML/MIN/1.73
GLUCOSE SERPL-MCNC: 116 MG/DL (ref 65–99)
HCT VFR BLD AUTO: 22.7 % (ref 37.5–51)
HCT VFR BLD AUTO: 25.8 % (ref 37.5–51)
HCT VFR BLD AUTO: 26 % (ref 37.5–51)
HGB BLD-MCNC: 7.7 G/DL (ref 13–17.7)
HGB BLD-MCNC: 8.9 G/DL (ref 13–17.7)
HGB BLD-MCNC: 9 G/DL (ref 13–17.7)
INR PPP: 1.21 (ref 2–3)
LYMPHOCYTES # BLD AUTO: 0.8 10*3/MM3 (ref 0.7–3.1)
LYMPHOCYTES NFR BLD AUTO: 5.4 % (ref 19.6–45.3)
MCH RBC QN AUTO: 33.8 PG (ref 26.6–33)
MCHC RBC AUTO-ENTMCNC: 34.4 G/DL (ref 31.5–35.7)
MCV RBC AUTO: 98.1 FL (ref 79–97)
MONOCYTES # BLD AUTO: 1 10*3/MM3 (ref 0.1–0.9)
MONOCYTES NFR BLD AUTO: 6.2 % (ref 5–12)
NEUTROPHILS NFR BLD AUTO: 13.6 10*3/MM3 (ref 1.7–7)
NEUTROPHILS NFR BLD AUTO: 87.8 % (ref 42.7–76)
NRBC BLD AUTO-RTO: 0.1 /100 WBC (ref 0–0.2)
PLATELET # BLD AUTO: 151 10*3/MM3 (ref 140–450)
PMV BLD AUTO: 8.1 FL (ref 6–12)
POTASSIUM SERPL-SCNC: 4.3 MMOL/L (ref 3.5–5.2)
PROTHROMBIN TIME: 13.2 SECONDS (ref 19.4–28.5)
RBC # BLD AUTO: 2.63 10*6/MM3 (ref 4.14–5.8)
SODIUM SERPL-SCNC: 134 MMOL/L (ref 136–145)
UNIT  ABO: NORMAL
UNIT  RH: NORMAL
WBC NRBC COR # BLD: 15.5 10*3/MM3 (ref 3.4–10.8)

## 2021-12-02 PROCEDURE — 85014 HEMATOCRIT: CPT | Performed by: PHYSICIAN ASSISTANT

## 2021-12-02 PROCEDURE — 93296 REM INTERROG EVL PM/IDS: CPT | Performed by: INTERNAL MEDICINE

## 2021-12-02 PROCEDURE — 36430 TRANSFUSION BLD/BLD COMPNT: CPT

## 2021-12-02 PROCEDURE — 93294 REM INTERROG EVL PM/LDLS PM: CPT | Performed by: INTERNAL MEDICINE

## 2021-12-02 PROCEDURE — 85018 HEMOGLOBIN: CPT | Performed by: PHYSICIAN ASSISTANT

## 2021-12-02 PROCEDURE — 25010000002 CEFAZOLIN PER 500 MG: Performed by: UROLOGY

## 2021-12-02 PROCEDURE — 80048 BASIC METABOLIC PNL TOTAL CA: CPT | Performed by: PHYSICIAN ASSISTANT

## 2021-12-02 PROCEDURE — P9016 RBC LEUKOCYTES REDUCED: HCPCS

## 2021-12-02 PROCEDURE — 86923 COMPATIBILITY TEST ELECTRIC: CPT

## 2021-12-02 PROCEDURE — 86900 BLOOD TYPING SEROLOGIC ABO: CPT

## 2021-12-02 PROCEDURE — 97110 THERAPEUTIC EXERCISES: CPT

## 2021-12-02 PROCEDURE — 85025 COMPLETE CBC W/AUTO DIFF WBC: CPT | Performed by: PHYSICIAN ASSISTANT

## 2021-12-02 PROCEDURE — 85610 PROTHROMBIN TIME: CPT | Performed by: PHYSICIAN ASSISTANT

## 2021-12-02 PROCEDURE — 99232 SBSQ HOSP IP/OBS MODERATE 35: CPT | Performed by: INTERNAL MEDICINE

## 2021-12-02 PROCEDURE — 97116 GAIT TRAINING THERAPY: CPT

## 2021-12-02 RX ORDER — LIDOCAINE HYDROCHLORIDE 20 MG/ML
JELLY TOPICAL ONCE
Status: COMPLETED | OUTPATIENT
Start: 2021-12-02 | End: 2021-12-02

## 2021-12-02 RX ORDER — POLYETHYLENE GLYCOL 3350 17 G/17G
17 POWDER, FOR SOLUTION ORAL 2 TIMES DAILY
Status: DISCONTINUED | OUTPATIENT
Start: 2021-12-02 | End: 2021-12-04 | Stop reason: HOSPADM

## 2021-12-02 RX ORDER — DIPHENHYDRAMINE HCL 25 MG
12.5 TABLET ORAL ONCE
Status: DISCONTINUED | OUTPATIENT
Start: 2021-12-02 | End: 2021-12-02

## 2021-12-02 RX ADMIN — ROSUVASTATIN CALCIUM 20 MG: 10 TABLET, FILM COATED ORAL at 21:25

## 2021-12-02 RX ADMIN — CALCIUM CARBONATE (ANTACID) CHEW TAB 500 MG 2 TABLET: 500 CHEW TAB at 21:26

## 2021-12-02 RX ADMIN — MELOXICAM 15 MG: 15 TABLET ORAL at 08:32

## 2021-12-02 RX ADMIN — GUAIFENESIN 400 MG: 200 TABLET ORAL at 08:32

## 2021-12-02 RX ADMIN — DIPHENHYDRAMINE HYDROCHLORIDE 12.5 MG: 25 SOLUTION ORAL at 10:47

## 2021-12-02 RX ADMIN — ACETAMINOPHEN 1000 MG: 500 TABLET, FILM COATED ORAL at 08:32

## 2021-12-02 RX ADMIN — POLYETHYLENE GLYCOL 3350 17 G: 17 POWDER, FOR SOLUTION ORAL at 21:25

## 2021-12-02 RX ADMIN — AMLODIPINE BESYLATE 2.5 MG: 2.5 TABLET ORAL at 08:32

## 2021-12-02 RX ADMIN — DOCUSATE SODIUM 200 MG: 100 CAPSULE, LIQUID FILLED ORAL at 08:32

## 2021-12-02 RX ADMIN — PANTOPRAZOLE SODIUM 40 MG: 40 TABLET, DELAYED RELEASE ORAL at 21:25

## 2021-12-02 RX ADMIN — SODIUM CHLORIDE 100 ML/HR: 9 INJECTION, SOLUTION INTRAVENOUS at 06:32

## 2021-12-02 RX ADMIN — TAMSULOSIN HYDROCHLORIDE 0.4 MG: 0.4 CAPSULE ORAL at 08:32

## 2021-12-02 RX ADMIN — GABAPENTIN 100 MG: 100 CAPSULE ORAL at 21:25

## 2021-12-02 RX ADMIN — CLOPIDOGREL BISULFATE 75 MG: 75 TABLET ORAL at 08:32

## 2021-12-02 RX ADMIN — CEFAZOLIN SODIUM 2 G: 10 INJECTION, POWDER, FOR SOLUTION INTRAVENOUS at 08:32

## 2021-12-02 RX ADMIN — CALCIUM CARBONATE (ANTACID) CHEW TAB 500 MG 2 TABLET: 500 CHEW TAB at 12:56

## 2021-12-02 RX ADMIN — ZOLPIDEM TARTRATE 5 MG: 5 TABLET ORAL at 21:40

## 2021-12-02 RX ADMIN — OXYCODONE 5 MG: 5 TABLET ORAL at 06:33

## 2021-12-02 RX ADMIN — CALCIUM CARBONATE (ANTACID) CHEW TAB 500 MG 2 TABLET: 500 CHEW TAB at 09:41

## 2021-12-02 RX ADMIN — LIDOCAINE HYDROCHLORIDE: 20 JELLY TOPICAL at 08:05

## 2021-12-02 RX ADMIN — POLYETHYLENE GLYCOL 3350 17 G: 17 POWDER, FOR SOLUTION ORAL at 08:31

## 2021-12-02 RX ADMIN — WARFARIN 5 MG: 5 TABLET ORAL at 17:48

## 2021-12-02 RX ADMIN — ACETAMINOPHEN 1000 MG: 500 TABLET, FILM COATED ORAL at 16:22

## 2021-12-02 RX ADMIN — FERROUS SULFATE TAB EC 324 MG (65 MG FE EQUIVALENT) 324 MG: 324 (65 FE) TABLET DELAYED RESPONSE at 08:32

## 2021-12-02 NOTE — PROGRESS NOTES
[facility] Medicine Services Daily Progress Note    Patient Name: Junior MONI Robles  : 1935  MRN: [id number]  Primary Care Physician:  Chace Cano MD  Date of admission: 2021      Subjective      Chief Complaint: Hip surgery    History of Present Illness:  Junior MONI Robles is a 86 y.o. male patient of Dr. Spivey who was given medical clearance for this planned procedure.  The patient has a history of severe osteoarthritis of both hips.  He is status post previous left hip replacement.  The patient is now admitted for right hip replacement.  He was advised to hold Coumadin for 5 days prior to surgery & to hold Plavix 5 to 7 days before surgery.  Patient was otherwise cleared for surgery.     Medical consultation requested by Dr. Hickman.  Patient has been on warfarin for problems including sick sinus syndrome and previous pacemaker placement.  History of ablation for AV roc reentry tachycardia.  History of coronary bypass surgery.  History of coronary artery disease.  History of chronic atrial fibrillation.     Patient previously was intolerant to sotalol and was intolerant to amiodarone as well.  Since the AV ablation he has apparently been doing well.      Patient Reports:  2021: Expected postop hip pain.  2021: No additional problems.  Patient continues to have severe hearing loss.  Patient plans to get up later this afternoon in the chair.    ROS   No new complaints today.      Objective      Vitals:   Temp:  [98 °F (36.7 °C)-99.3 °F (37.4 °C)] 98.6 °F (37 °C)  Heart Rate:  [71-87] 86  Resp:  [16-29] 22  BP: (115-160)/(41-57) 126/55    Physical Exam   Physical Exam  Vitals and nursing note reviewed.   Constitutional:       Appearance: Normal appearance. He is normal weight. He is not ill-appearing or toxic-appearing.   HENT:      Head: Normocephalic and atraumatic.      Nose: Nose normal.      Mouth/Throat:      Mouth: Mucous membranes are moist.      Comments:  Appears edentulous  Eyes:      Extraocular Movements: Extraocular movements intact.      Pupils: Pupils are equal, round, and reactive to light.   Cardiovascular:      Rate and Rhythm: Normal rate and regular rhythm.      Pulses: Normal pulses.      Heart sounds: Normal heart sounds.   Pulmonary:      Effort: Pulmonary effort is normal.   Abdominal:      General: Bowel sounds are normal. There is no distension.      Palpations: Abdomen is soft. There is no mass.      Tenderness: There is no abdominal tenderness.   Genitourinary:  Patient continued to have Hayes in place.  Has been started on tamsulosin.  Urology is following and anticipate possible removal of Hayes in the a.m.  Musculoskeletal:         General: Normal range of motion.      Cervical back: Normal range of motion.   Skin:     General: Skin is warm and dry.      Capillary Refill: Capillary refill takes less than 2 seconds.   Neurological:      General: No focal deficit present.      Mental Status: He is alert and oriented to person, place, and time.      Comments: Significant hearing loss noted   Psychiatric:         Mood and Affect: Mood normal.         Behavior: Behavior normal.         Thought Content: Thought content normal.         Judgment: Judgment normal.        Result Review    Result Review:  I have personally reviewed the results from the time of this admission to 12/2/2021 11:30 EST and agree with these findings:  [x]  Laboratory  [x]  Microbiology  [x]  Radiology  []  EKG/Telemetry   []  Cardiology/Vascular   []  Pathology  []  Old records  []  Other:  Most notable findings include: Hearing loss and osteoarthritis    Wounds (last 24 hours)     LDA Wound     Row Name 12/02/21 0318 12/01/21 2305 12/01/21 1929       Wound 11/30/21 1050 Right anterior greater trochanter    Wound - Properties Group Placement Date: 11/30/21  -CW Placement Time: 1050  -CW Present on Hospital Admission: Y  -CW Side: Right  -CW Orientation: anterior  -CW Location:  greater trochanter  -CW    Dressing Appearance -- dry; intact  -LS dry; intact  -LS    Closure MARIANO  -LS MARIANO  -LS MARIANO  -LS    Drainage Amount none  -LS none  -LS none  -LS    Retired Wound - Properties Group Date first assessed: 11/30/21  -CW Time first assessed: 1050  -CW Present on Hospital Admission: Y  -CW Side: Right  -CW Location: greater trochanter  -CW    Row Name 12/01/21 1500             Wound 11/30/21 1050 Right anterior greater trochanter    Wound - Properties Group Placement Date: 11/30/21  -CW Placement Time: 1050  -CW Present on Hospital Admission: Y  -CW Side: Right  -CW Orientation: anterior  -CW Location: greater trochanter  -CW      Dressing Appearance dry; intact  -AY      Closure MARIANO  -AY      Retired Wound - Properties Group Date first assessed: 11/30/21  -CW Time first assessed: 1050  -CW Present on Hospital Admission: Y  -CW Side: Right  -CW Location: greater trochanter  -CW            User Key  (r) = Recorded By, (t) = Taken By, (c) = Cosigned By    Initials Name Provider Type    CW Ivon Wilkins, RN Registered Nurse    Kathleen Padilla RN Registered Nurse    Socorro Bocanegra RN Registered Nurse                  Assessment/Plan      Brief Patient Summary:  Junior MONI Robles is a 86 y.o. male who presented for elective repair.  Patient has chronic medical problems which appears to be overall stable.  Patient did develop some acute urinary retention postop.  He was seen by urology.      acetaminophen, 1,000 mg, Oral, Q8H  amLODIPine, 2.5 mg, Oral, Daily  calcium carbonate, 2 tablet, Oral, 4x Daily PC & at Bedtime  clopidogrel, 75 mg, Oral, Daily  docusate sodium, 200 mg, Oral, Daily  ferrous sulfate, 324 mg, Oral, Daily With Breakfast  gabapentin, 100 mg, Oral, Nightly  guaiFENesin, 400 mg, Oral, Daily  Nutrisource fiber, 1 packet, Oral, Daily  pantoprazole, 40 mg, Oral, Nightly  polyethylene glycol, 17 g, Oral, BID  rosuvastatin, 20 mg, Oral, Nightly  tamsulosin, 0.4 mg, Oral,  Daily  warfarin, 5 mg, Oral, Once per day on Sun Mon Tue Wed Thu Sat  [START ON 12/3/2021] warfarin, 7.5 mg, Oral, Once per day on Fri       sodium chloride, 100 mL/hr, Last Rate: 100 mL/hr (12/02/21 0632)         Active Hospital Problems:  Active Hospital Problems    Diagnosis    • **Primary osteoarthritis of right hip    • Moderate malnutrition (CMS/HCC)    • Status post total hip replacement, left      Assessment/plan:     Coronary artery disease:  Status post CABG and previous PCI stenting.    Patient remains free of any coronary symptoms.    Will plan to monitor for evidence of any acute coronary symptoms.     History of recurrent & paroxysmal atrial fibrillation  Status post ablation procedure after recurrent episodes of palpitations  dual-chamber pacemaker in situ with normal function  Intolerant to amiodarone and sotalol  Doing well now with minimal palpitations.   Patient be restarted on warfarin tonight.  Pharmacy is managing.  12/1/2021: Patient restarted on warfarin last night.  12/2/2021: INR remains subtherapeutic.  Dosing per pharmacy.     History of symptomatic palpitations  Noted to have history suggestive of AV roc reentrant tachycardia  He is currently post ablation with complete resolution of symptoms      Essential hypertension    Blood pressure well controlled on current medications.      Ischemic cardiomyopathy with LV ejection fraction of 45%  Abnormal stress test with prior myocardial infarction with no significant reversible ischemia  12/2/2021: No signs of worsening CHF or exacerbation of CHF or heart failure.    Postop urinary retention:  Patient started on tamsulosin.  Followed by urology  12/2/2021: Patient continues on tamsulosin.  Hayes still in place.  Possible voiding trial in the a.m.    DVT prophylaxis:  Medical and mechanical DVT prophylaxis orders are present.    CODE STATUS:    Code Status (Patient has no pulse and is not breathing): CPR (Attempt to Resuscitate)  Medical  Interventions (Patient has pulse or is breathing): Full Support      Disposition:  I expect patient to be discharged 2 to 3 days.    This patient has been examined wearing appropriate Personal Protective Equipment and as recommended by the CDC. 12/02/21      Electronically signed by Beto Hollingsworth MD, 12/02/21, 11:30 EST.  [facility] Hospitalist Team

## 2021-12-02 NOTE — THERAPY TREATMENT NOTE
Subjective: Pt agreeable to therapeutic plan of care.    Objective:     Bed mobility - Min-A and Mod-A assist for RLE, increased time and effort.  Transfers - Min-A and with rolling walker bed elevated   Ambulation - 15 feet CGA, Min-A and with rolling walker     WB'ing status: R Lower Extremity Weight Bearing As Tolerated    Therapeutic Exercise: 10 Reps R Lower Extremity AROM Total Hip: Ankle Pumps, Glut Sets, Heel slides <90 degrees, Hip Abduction, Mini-Squat    Precautions: High falls risk    Pain: 6 VAS  Education: Provided education on importance of mobility and skilled verbal / tactile cueing throughout intervention.     Assessment: Lui MONI Robles presents with functional mobility impairments which indicate the need for skilled intervention.  Pt with continued reports of pain with mobility but overall tolerates well. Continues to demo R hip/knee flexion in standing and cuing to place foot flat for improved LE alignment. Ambulates with step to antaglic gait. Tolerating session today without incident. Will continue to follow and progress as tolerated.     Plan/Recommendations:   Pt would benefit from Inpatient Rehabilitation placement at discharge from facility and requires no DME at discharge.   Pt desires Inpatient Rehabilitation placement at discharge. Pt cooperative; agreeable to therapeutic recommendations and plan of care.     Basic Mobility 6-click:  Rollin = Total, A lot = 2, A little = 3; 4 = None  Supine>Sit:   1 = Total, A lot = 2, A little = 3; 4 = None   Sit>Stand with arms:  1 = Total, A lot = 2, A little = 3; 4 = None  Bed>Chair:   1 = Total, A lot = 2, A little = 3; 4 = None  Ambulate in room:  1 = Total, A lot = 2, A little = 3; 4 = None  3-5 Steps with railin = Total, A lot = 2, A little = 3; 4 = None  Score: 16    Modified Paden: N/A = No pre-op stroke/TIA    Post-Tx Position: Up in Chair, Staff Present, Alarms activated and Call light and personal items within reach  PPE:  gloves, surgical mask, eyewear protection

## 2021-12-02 NOTE — PROGRESS NOTES
[facility] Medicine Services Daily Progress Note    Patient Name: Junior MONI Robles  : 1935  MRN: [id number]  Primary Care Physician:  Chace Cano MD  Date of admission: 2021      Subjective      Chief Complaint: Hip surgery    History of Present Illness:  Junior OMNI Robles is a 86 y.o. male patient of Dr. Spivey who was given medical clearance for this planned procedure.  The patient has a history of severe osteoarthritis of both hips.  He is status post previous left hip replacement.  The patient is now admitted for right hip replacement.  He was advised to hold Coumadin for 5 days prior to surgery & to hold Plavix 5 to 7 days before surgery.  Patient was otherwise cleared for surgery.     Medical consultation requested by Dr. Hickman.  Patient has been on warfarin for problems including sick sinus syndrome and previous pacemaker placement.  History of ablation for AV roc reentry tachycardia.  History of coronary bypass surgery.  History of coronary artery disease.  History of chronic atrial fibrillation.     Patient previously was intolerant to sotalol and was intolerant to amiodarone as well.  Since the AV ablation he has apparently been doing well.      Patient Reports:  2021: Expected postop hip pain.    ROS   No new complaints today.      Objective      Vitals:   Temp:  [98 °F (36.7 °C)-99.9 °F (37.7 °C)] 98.4 °F (36.9 °C)  Heart Rate:  [71-92] 85  Resp:  [16-22] 22  BP: ()/(43-67) 119/49    Physical Exam   Physical Exam  Vitals and nursing note reviewed.   Constitutional:       Appearance: Normal appearance. He is normal weight. He is not ill-appearing or toxic-appearing.   HENT:      Head: Normocephalic and atraumatic.      Nose: Nose normal.      Mouth/Throat:      Mouth: Mucous membranes are moist.      Comments: Appears edentulous  Eyes:      Extraocular Movements: Extraocular movements intact.      Pupils: Pupils are equal, round, and reactive to  light.   Cardiovascular:      Rate and Rhythm: Normal rate and regular rhythm.      Pulses: Normal pulses.      Heart sounds: Normal heart sounds.   Pulmonary:      Effort: Pulmonary effort is normal.   Abdominal:      General: Bowel sounds are normal. There is no distension.      Palpations: Abdomen is soft. There is no mass.      Tenderness: There is no abdominal tenderness.   Musculoskeletal:         General: Normal range of motion.      Cervical back: Normal range of motion.   Skin:     General: Skin is warm and dry.      Capillary Refill: Capillary refill takes less than 2 seconds.   Neurological:      General: No focal deficit present.      Mental Status: He is alert and oriented to person, place, and time.      Comments: Significant hearing loss noted   Psychiatric:         Mood and Affect: Mood normal.         Behavior: Behavior normal.         Thought Content: Thought content normal.         Judgment: Judgment normal.        Result Review    Result Review:  I have personally reviewed the results from the time of this admission to 12/1/2021 22:17 EST and agree with these findings:  [x]  Laboratory  [x]  Microbiology  [x]  Radiology  []  EKG/Telemetry   []  Cardiology/Vascular   []  Pathology  []  Old records  []  Other:  Most notable findings include: Hearing loss and osteoarthritis    Wounds (last 24 hours)     LDA Wound     Row Name 12/01/21 1500 12/01/21 1100 12/01/21 0715       Wound 11/30/21 1050 Right anterior greater trochanter    Wound - Properties Group Placement Date: 11/30/21  -CW Placement Time: 1050  -CW Present on Hospital Admission: Y  -CW Side: Right  -CW Orientation: anterior  -CW Location: greater trochanter  -CW    Dressing Appearance dry; intact  -AY dry; intact  -AY dry; intact  -AY    Closure MARIANO  -AY MARIANO  -AY MARIANO  -AY    Retired Wound - Properties Group Date first assessed: 11/30/21  -CW Time first assessed: 1050  -CW Present on Hospital Admission: Y  -CW Side: Right  -CW Location:  greater trochanter  -CW          User Key  (r) = Recorded By, (t) = Taken By, (c) = Cosigned By    Initials Name Provider Type    CW Ivon Wilkins, RN Registered Nurse    Socorro Bocanegra RN Registered Nurse                  Assessment/Plan      Brief Patient Summary:  Junior MONI Robles is a 86 y.o. male who presented for elective repair.  Patient has chronic medical problems which appears to be overall stable.  Patient did develop some acute urinary retention postop.  He was seen by urology.      acetaminophen, 1,000 mg, Oral, Q8H  amLODIPine, 2.5 mg, Oral, Daily  calcium carbonate, 2 tablet, Oral, 4x Daily PC & at Bedtime  clopidogrel, 75 mg, Oral, Daily  docusate sodium, 200 mg, Oral, Daily  ferrous sulfate, 324 mg, Oral, Daily With Breakfast  gabapentin, 100 mg, Oral, Nightly  guaiFENesin, 400 mg, Oral, Daily  meloxicam, 15 mg, Oral, Daily  Nutrisource fiber, 1 packet, Oral, Daily  pantoprazole, 40 mg, Oral, Nightly  polyethylene glycol, 17 g, Oral, Daily  rosuvastatin, 20 mg, Oral, Nightly  tamsulosin, 0.4 mg, Oral, Daily  warfarin, 5 mg, Oral, Once per day on Sun Mon Tue Wed Thu Sat  [START ON 12/3/2021] warfarin, 7.5 mg, Oral, Once per day on Fri       sodium chloride, 100 mL/hr, Last Rate: 100 mL/hr (12/01/21 1949)         Active Hospital Problems:  Active Hospital Problems    Diagnosis    • **Primary osteoarthritis of right hip    • Moderate malnutrition (CMS/HCC)    • Status post total hip replacement, left      Assessment/plan:     Coronary artery disease:  Status post CABG and previous PCI stenting.    Patient remains free of any coronary symptoms.    Will plan to monitor for evidence of any acute coronary symptoms.     History of recurrent & paroxysmal atrial fibrillation  Status post ablation procedure after recurrent episodes of palpitations  dual-chamber pacemaker in situ with normal function  Intolerant to amiodarone and sotalol  Doing well now with minimal palpitations.   Patient be restarted on  warfarin tonight.  Pharmacy is managing.  12/1/2021: Patient restarted on warfarin last night.     History of symptomatic palpitations  Noted to have history suggestive of AV roc reentrant tachycardia  He is currently post ablation with complete resolution of symptoms      Essential hypertension    Blood pressure well controlled on current medications.      Ischemic cardiomyopathy with LV ejection fraction of 45%  Abnormal stress test with prior myocardial infarction with no significant reversible ischemia    Postop urinary retention:  Patient started on tamsulosin.  Followed by urology    DVT prophylaxis:  Medical and mechanical DVT prophylaxis orders are present.    CODE STATUS:    Code Status (Patient has no pulse and is not breathing): CPR (Attempt to Resuscitate)  Medical Interventions (Patient has pulse or is breathing): Full Support      Disposition:  I expect patient to be discharged 2 to 3 days.    This patient has been examined wearing appropriate Personal Protective Equipment and as recommended by the CDC. 12/01/21      Electronically signed by Beto Hollingsworth MD, 12/01/21, 22:17 EST.  [facility] Hospitalist Team

## 2021-12-02 NOTE — PLAN OF CARE
Goal Outcome Evaluation:  Plan of Care Reviewed With: patient           Outcome Summary: pt abed with eyes closed at this time. pt denies pain, no s/sx of distress noted. pt infused 1 unit prbcs this shift, quyen well. Will cont to monitor

## 2021-12-02 NOTE — PROGRESS NOTES
"  [facility] DAILY PROGRESS NOTE    Patient Identification  Name: Junior MONI Robles  Age: 86 y.o.  Sex: male  :  1935  MRN: [id number]    Date: 2021             Subjective:  Interval History: Still with some difficulty voiding, ready for cystoscopy this morning    Objective:    Scheduled Meds:acetaminophen, 1,000 mg, Oral, Q8H  amLODIPine, 2.5 mg, Oral, Daily  calcium carbonate, 2 tablet, Oral, 4x Daily PC & at Bedtime  clopidogrel, 75 mg, Oral, Daily  docusate sodium, 200 mg, Oral, Daily  ferrous sulfate, 324 mg, Oral, Daily With Breakfast  gabapentin, 100 mg, Oral, Nightly  guaiFENesin, 400 mg, Oral, Daily  Lidocaine HCl gel, , Topical, Once  meloxicam, 15 mg, Oral, Daily  Nutrisource fiber, 1 packet, Oral, Daily  pantoprazole, 40 mg, Oral, Nightly  polyethylene glycol, 17 g, Oral, Daily  rosuvastatin, 20 mg, Oral, Nightly  tamsulosin, 0.4 mg, Oral, Daily  warfarin, 5 mg, Oral, Once per day on Sun Mon Tue Wed Thu Sat  [START ON 12/3/2021] warfarin, 7.5 mg, Oral, Once per day on Fri      Continuous Infusions:sodium chloride, 100 mL/hr, Last Rate: 100 mL/hr (21 0632)      PRN Meds:diphenhydrAMINE **OR** diphenhydrAMINE  •  Morphine **AND** naloxone  •  ondansetron **OR** ondansetron  •  oxyCODONE  •  oxyCODONE  •  promethazine  •  traMADol  •  zolpidem    Vital signs in last 24 hours:  Temp:  [98 °F (36.7 °C)-99.9 °F (37.7 °C)] 98.7 °F (37.1 °C)  Heart Rate:  [71-92] 82  Resp:  [16-23] 20  BP: (115-160)/(41-67) 115/41    Intake/Output:    Intake/Output Summary (Last 24 hours) at 2021 0742  Last data filed at 2021 0617  Gross per 24 hour   Intake 2548.25 ml   Output 975 ml   Net 1573.25 ml       Exam:  /41 (BP Location: Left arm, Patient Position: Lying)   Pulse 82   Temp 98.7 °F (37.1 °C) (Oral)   Resp 20   Ht 182.9 cm (72\")   Wt 61.4 kg (135 lb 5.8 oz)   SpO2 90%   BMI 18.36 kg/m²     General Appearance:    Alert, cooperative, no distress, appears stated age             "   Abdomen:     Soft, ND   :    No suprapubic distention            Bedside Cystoscopy  The plan was discussed with the patient who agreed and consented to bedside cystoscopy as described.  The patient was prepped and draped in the usual fashion.  Flexible cystourethroscopy was performed which revealed a patent urethra with no lesions.  Cystoscopy was performed which revealed short fossa prostate with slightly narrow bladder neck, scarring consistent with history of rezum.  Bladder was distended with moderate trabeculations no tumor seen.  16 Yi Hayes was placed easily for clear yellow urine  Patient tolerated the procedure well with no complications      Data Review:  All labs (24hrs):   Recent Results (from the past 24 hour(s))   Hemoglobin & Hematocrit, Blood    Collection Time: 12/01/21 10:37 AM    Specimen: Blood   Result Value Ref Range    Hemoglobin 7.4 (L) 13.0 - 17.7 g/dL    Hematocrit 21.9 (L) 37.5 - 51.0 %   Prepare RBC, 1 Units    Collection Time: 12/02/21  2:00 AM   Result Value Ref Range    Product Code E7579P06     Unit Number [id number]     UNIT  ABO O     UNIT  RH POS     Crossmatch Interpretation Compatible     Dispense Status PT     Blood Expiration Date [id number]     Blood Type Barcode 5100    Protime-INR    Collection Time: 12/02/21  2:55 AM    Specimen: Blood   Result Value Ref Range    Protime 13.2 (L) 19.4 - 28.5 Seconds    INR 1.21 (L) 2.00 - 3.00   Hemoglobin & Hematocrit, Blood    Collection Time: 12/02/21  2:55 AM    Specimen: Blood   Result Value Ref Range    Hemoglobin 7.7 (L) 13.0 - 17.7 g/dL    Hematocrit 22.7 (L) 37.5 - 51.0 %   Basic Metabolic Panel    Collection Time: 12/02/21  2:55 AM    Specimen: Blood   Result Value Ref Range    Glucose 116 (H) 65 - 99 mg/dL    BUN 22 8 - 23 mg/dL    Creatinine 1.28 (H) 0.76 - 1.27 mg/dL    Sodium 134 (L) 136 - 145 mmol/L    Potassium 4.3 3.5 - 5.2 mmol/L    Chloride 102 98 - 107 mmol/L    CO2 24.0 22.0 - 29.0 mmol/L    Calcium 7.5  (L) 8.6 - 10.5 mg/dL    eGFR Non African Amer 53 (L) >60 mL/min/1.73    BUN/Creatinine Ratio 17.2 7.0 - 25.0    Anion Gap 8.0 5.0 - 15.0 mmol/L      Imaging Results (Last 24 Hours)     ** No results found for the last 24 hours. **           Assessment:    Primary osteoarthritis of right hip    Status post total hip replacement, left    Moderate malnutrition (CMS/HCC)      Retention  History of rezum    Plan:    Cystoscopy this morning showing distended bladder with slightly narrow bladder neck but 16 Danish Hayes placed easily for clear yellow urine  Plan will be for voiding trial tomorrow and continue Flomax    Josiah Hameed MD  [facility]  [address], [address]  [place] [address] [phone]  12/02/21  07:42 EST

## 2021-12-02 NOTE — PLAN OF CARE
Goal Outcome Evaluation:            Denies c/o pain except for when moving at times. Dsg c/d/I. Legs in good alignment. Scds in place and working. Ice pack is on. Bedside cysto this a.m.

## 2021-12-02 NOTE — PLAN OF CARE
Assessment: Junior MONI Robles presents with functional mobility impairments which indicate the need for skilled intervention.  Pt with continued reports of pain with mobility but overall tolerates well. Continues to demo R hip/knee flexion in standing and cuing to place foot flat for improved LE alignment. Ambulates with step to antaglic gait. Tolerating session today without incident. Will continue to follow and progress as tolerated.     Plan/Recommendations:   Pt would benefit from Inpatient Rehabilitation placement at discharge from facility and requires no DME at discharge.   Pt desires Inpatient Rehabilitation placement at discharge. Pt cooperative; agreeable to therapeutic recommendations and plan of care.

## 2021-12-03 PROBLEM — M16.11 PRIMARY OSTEOARTHRITIS OF RIGHT HIP: Status: RESOLVED | Noted: 2021-10-06 | Resolved: 2021-12-03

## 2021-12-03 LAB
BASOPHILS # BLD AUTO: 0 10*3/MM3 (ref 0–0.2)
BASOPHILS NFR BLD AUTO: 0.2 % (ref 0–1.5)
BH BB BLOOD EXPIRATION DATE: NORMAL
BH BB BLOOD TYPE BARCODE: 5100
BH BB DISPENSE STATUS: NORMAL
BH BB PRODUCT CODE: NORMAL
BH BB UNIT NUMBER: NORMAL
CROSSMATCH INTERPRETATION: NORMAL
DEPRECATED RDW RBC AUTO: 59.9 FL (ref 37–54)
EOSINOPHIL # BLD AUTO: 0.1 10*3/MM3 (ref 0–0.4)
EOSINOPHIL NFR BLD AUTO: 0.5 % (ref 0.3–6.2)
ERYTHROCYTE [DISTWIDTH] IN BLOOD BY AUTOMATED COUNT: 17.9 % (ref 12.3–15.4)
HCT VFR BLD AUTO: 25.3 % (ref 37.5–51)
HCT VFR BLD AUTO: 25.6 % (ref 37.5–51)
HGB BLD-MCNC: 8.5 G/DL (ref 13–17.7)
HGB BLD-MCNC: 8.9 G/DL (ref 13–17.7)
INR PPP: 1.22 (ref 2–3)
LYMPHOCYTES # BLD AUTO: 0.6 10*3/MM3 (ref 0.7–3.1)
LYMPHOCYTES NFR BLD AUTO: 4.9 % (ref 19.6–45.3)
MCH RBC QN AUTO: 33.3 PG (ref 26.6–33)
MCHC RBC AUTO-ENTMCNC: 34.5 G/DL (ref 31.5–35.7)
MCV RBC AUTO: 96.5 FL (ref 79–97)
MONOCYTES # BLD AUTO: 0.7 10*3/MM3 (ref 0.1–0.9)
MONOCYTES NFR BLD AUTO: 5.3 % (ref 5–12)
NEUTROPHILS NFR BLD AUTO: 11.4 10*3/MM3 (ref 1.7–7)
NEUTROPHILS NFR BLD AUTO: 89.1 % (ref 42.7–76)
NRBC BLD AUTO-RTO: 0 /100 WBC (ref 0–0.2)
PLATELET # BLD AUTO: 144 10*3/MM3 (ref 140–450)
PMV BLD AUTO: 7.7 FL (ref 6–12)
PROCALCITONIN SERPL-MCNC: 0.46 NG/ML (ref 0–0.25)
PROTHROMBIN TIME: 13.3 SECONDS (ref 19.4–28.5)
RBC # BLD AUTO: 2.66 10*6/MM3 (ref 4.14–5.8)
UNIT  ABO: NORMAL
UNIT  RH: NORMAL
WBC NRBC COR # BLD: 12.8 10*3/MM3 (ref 3.4–10.8)

## 2021-12-03 PROCEDURE — 97116 GAIT TRAINING THERAPY: CPT

## 2021-12-03 PROCEDURE — 99232 SBSQ HOSP IP/OBS MODERATE 35: CPT | Performed by: INTERNAL MEDICINE

## 2021-12-03 PROCEDURE — 85014 HEMATOCRIT: CPT | Performed by: PHYSICIAN ASSISTANT

## 2021-12-03 PROCEDURE — 85610 PROTHROMBIN TIME: CPT | Performed by: PHYSICIAN ASSISTANT

## 2021-12-03 PROCEDURE — 97110 THERAPEUTIC EXERCISES: CPT

## 2021-12-03 PROCEDURE — 87040 BLOOD CULTURE FOR BACTERIA: CPT | Performed by: INTERNAL MEDICINE

## 2021-12-03 PROCEDURE — 85018 HEMOGLOBIN: CPT | Performed by: PHYSICIAN ASSISTANT

## 2021-12-03 PROCEDURE — 85025 COMPLETE CBC W/AUTO DIFF WBC: CPT | Performed by: INTERNAL MEDICINE

## 2021-12-03 PROCEDURE — 99024 POSTOP FOLLOW-UP VISIT: CPT | Performed by: PHYSICIAN ASSISTANT

## 2021-12-03 PROCEDURE — 84145 PROCALCITONIN (PCT): CPT | Performed by: INTERNAL MEDICINE

## 2021-12-03 RX ORDER — TAMSULOSIN HYDROCHLORIDE 0.4 MG/1
0.4 CAPSULE ORAL DAILY
Qty: 30 CAPSULE | Refills: 0
Start: 2021-12-04 | End: 2022-01-26

## 2021-12-03 RX ORDER — GABAPENTIN 100 MG/1
100 CAPSULE ORAL NIGHTLY
Qty: 14 CAPSULE | Refills: 0
Start: 2021-12-03 | End: 2021-12-15

## 2021-12-03 RX ORDER — HYDROCODONE BITARTRATE AND ACETAMINOPHEN 5; 325 MG/1; MG/1
1 TABLET ORAL EVERY 6 HOURS PRN
Qty: 40 TABLET | Refills: 0
Start: 2021-12-03 | End: 2021-12-15

## 2021-12-03 RX ADMIN — GABAPENTIN 100 MG: 100 CAPSULE ORAL at 21:35

## 2021-12-03 RX ADMIN — DOCUSATE SODIUM 200 MG: 100 CAPSULE, LIQUID FILLED ORAL at 08:18

## 2021-12-03 RX ADMIN — POLYETHYLENE GLYCOL 3350 17 G: 17 POWDER, FOR SOLUTION ORAL at 08:17

## 2021-12-03 RX ADMIN — SODIUM CHLORIDE 100 ML/HR: 9 INJECTION, SOLUTION INTRAVENOUS at 14:35

## 2021-12-03 RX ADMIN — GUAIFENESIN 400 MG: 200 TABLET ORAL at 12:15

## 2021-12-03 RX ADMIN — TAMSULOSIN HYDROCHLORIDE 0.4 MG: 0.4 CAPSULE ORAL at 08:17

## 2021-12-03 RX ADMIN — FERROUS SULFATE TAB EC 324 MG (65 MG FE EQUIVALENT) 324 MG: 324 (65 FE) TABLET DELAYED RESPONSE at 08:18

## 2021-12-03 RX ADMIN — ACETAMINOPHEN 1000 MG: 500 TABLET, FILM COATED ORAL at 15:41

## 2021-12-03 RX ADMIN — ROSUVASTATIN CALCIUM 20 MG: 10 TABLET, FILM COATED ORAL at 21:36

## 2021-12-03 RX ADMIN — CLOPIDOGREL BISULFATE 75 MG: 75 TABLET ORAL at 08:18

## 2021-12-03 RX ADMIN — WARFARIN 7.5 MG: 5 TABLET ORAL at 18:02

## 2021-12-03 RX ADMIN — CALCIUM CARBONATE (ANTACID) CHEW TAB 500 MG 2 TABLET: 500 CHEW TAB at 21:35

## 2021-12-03 RX ADMIN — CALCIUM CARBONATE (ANTACID) CHEW TAB 500 MG 2 TABLET: 500 CHEW TAB at 18:02

## 2021-12-03 RX ADMIN — AMLODIPINE BESYLATE 2.5 MG: 2.5 TABLET ORAL at 08:18

## 2021-12-03 RX ADMIN — PANTOPRAZOLE SODIUM 40 MG: 40 TABLET, DELAYED RELEASE ORAL at 21:35

## 2021-12-03 RX ADMIN — ZOLPIDEM TARTRATE 5 MG: 5 TABLET ORAL at 21:35

## 2021-12-03 RX ADMIN — CALCIUM CARBONATE (ANTACID) CHEW TAB 500 MG 2 TABLET: 500 CHEW TAB at 08:18

## 2021-12-03 RX ADMIN — ACETAMINOPHEN 1000 MG: 500 TABLET, FILM COATED ORAL at 08:17

## 2021-12-03 RX ADMIN — CALCIUM CARBONATE (ANTACID) CHEW TAB 500 MG 2 TABLET: 500 CHEW TAB at 12:15

## 2021-12-03 NOTE — THERAPY TREATMENT NOTE
Subjective: Pt agreeable to therapeutic plan of care.    Objective:     Bed mobility - Min-A and Mod-A  Transfers - Min-A and with rolling walker  Ambulation - 30 feet x2 CGA w FWW    WB'ing status: R Lower Extremity Weight Bearing As Tolerated    Therapeutic Exercise: 10 Reps R Lower Extremity AROM Total Hip: Ankle Pumps, Glut Sets, Heel slides <90 degrees, Hip Abduction, Mini-Squat    Precautions: High falls risk    Pain: 4 VAS  Education: Provided education on importance of mobility and skilled verbal / tactile cueing throughout intervention.     Assessment: Lui MONI Robles presents with functional mobility impairments which indicate the need for skilled intervention. Pt with improved overall gait tolerance and stability noted this am. Continues to demo decreased heel strike on RLE and antalgic step to gait. Perseverating on need to have BM and urinate. Tolerating session today without incident. Will continue to follow and progress as tolerated.     Plan/Recommendations:   Pt would benefit from Inpatient Rehabilitation placement at discharge from facility and requires no DME at discharge.   Pt desires Inpatient Rehabilitation placement at discharge. Pt cooperative; agreeable to therapeutic recommendations and plan of care.     Basic Mobility 6-click:  Rollin = Total, A lot = 2, A little = 3; 4 = None  Supine>Sit:   1 = Total, A lot = 2, A little = 3; 4 = None   Sit>Stand with arms:  1 = Total, A lot = 2, A little = 3; 4 = None  Bed>Chair:   1 = Total, A lot = 2, A little = 3; 4 = None  Ambulate in room:  1 = Total, A lot = 2, A little = 3; 4 = None  3-5 Steps with railin = Total, A lot = 2, A little = 3; 4 = None  Score: 16    Modified Donnell: N/A = No pre-op stroke/TIA    Post-Tx Position: In bathroom and Call light and personal items within reach, nurse aware  PPE: gloves, surgical mask, eyewear protection

## 2021-12-03 NOTE — PROGRESS NOTES
Nutrition Services    Patient Name: Junior MONI Robles  YOB: 1935  MRN: [id number]  Admission date: 11/30/2021    PPE Documentation        PPE Worn By Provider Did not enter room for this encounter   PPE Worn By Patient  N/A     PROGRESS NOTE      Encounter Information: 12/3: Progress note to check on PO intakes and diet tolerance. Pt is now eating % at meals, with tolerance. Current diet and ONS are meeting needs. Will continue.       PO Diet: Diet Regular   PO Supplements: Boost Plus BID; Nutrisource fiber 1 packet daily    PO Intake:  % at recent meals        Nutrition support orders:    Nutrition support review:        Labs (reviewed below): Mild hyponatremia - monitor   Cr elevated - monitor        GI Function:  Small BM today 12/3       Nutrition Intervention: Continue diet and ONS as ordered, which are meeting needs with current intake.       Results from last 7 days   Lab Units 12/02/21  0255 12/01/21  0243   SODIUM mmol/L 134* 135*   POTASSIUM mmol/L 4.3 4.4   CHLORIDE mmol/L 102 101   CO2 mmol/L 24.0 25.0   BUN mg/dL 22 21   CREATININE mg/dL 1.28* 1.21   CALCIUM mg/dL 7.5* 7.5*   GLUCOSE mg/dL 116* 130*     Results from last 7 days   Lab Units 12/03/21  0644   HEMOGLOBIN g/dL 8.9*   HEMATOCRIT % 25.6*     COVID19   Date Value Ref Range Status   11/27/2021 Not Detected Not Detected - Ref. Range Final     Lab Results   Component Value Date    HGBA1C 5.5 11/30/2021     RD to follow up per protocol.    Electronically signed by:  Alexa Simmons RD  12/03/21 14:27 EST

## 2021-12-03 NOTE — PLAN OF CARE
Goal Outcome Evaluation:  Plan of Care Reviewed With: patient           Outcome Summary: Pt. rested in bed during shift, repositioning encouraged and assistance provided. Pt. denies pain at this time. FC removed this morning, pt. unable to void, bladder scan performed, and pt. was straight cathed this afternoon. Pt. voided independently this evening. Plan of care onoging. Call light and personal items within reach, bed alarm enabled.

## 2021-12-03 NOTE — DISCHARGE SUMMARY
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 male who has a longstanding history of right hip pain.  Preoperative imaging did reveal changes of DJD in the area of the right hip.  After exhausting conservative measures and reviewing the risks and benefits of surgery, he elected to proceed with a right anterior total hip replacement.  He underwent that procedure on 11/30/2021 and was delivered to the recovery room in stable condition.  Upon meeting their transfer criteria, he was transferred to the surgical inpatient floor in stable condition.  He did receive perioperative antibiotics and DVT prophylaxis.  He was evaluated by physical therapy team and did receive their services throughout his stay.  He did receive blood transfusions on postoperative day 1 and postoperative day 2 for acute blood loss anemia.  He was also seen by urology for BPH, urinary hesitancy, and incomplete emptying, and a Hayes catheter was placed, and the patient also had a cystoscopy at the bedside.  He was also started on Flomax.  Upon discharge, he was ambulating with the assistance of a walker, tolerating an oral diet, and his pain was controlled with oral medication.  Prior to discharge, all of his questions and concerns were addressed.    Procedures Performed    Procedure(s):  TOTAL HIP ARTHROPLASTY ANTERIOR WITH HANA TABLE  -------------------       Consults:   Consults       Date and Time Order Name Status Description    12/1/2021 12:38 PM Inpatient Urology Consult Completed      11/30/2021  3:30 PM Inpatient Hospitalist Consult Completed             Pertinent Test Results: labs: Reviewed and radiology: X-Ray: Reviewed    Condition on Discharge: To rehab in stable condition    Vital Signs  Temp:  [98.3 °F (36.8 °C)-100.7 °F (38.2 °C)] 99.8 °F (37.7 °C)  Heart Rate:  [86-91] 88  Resp:  [12-29] 16  BP: (117-131)/(45-55) 131/45    Physical Exam:   General Appearance alert, pleasant, appears stated age, interactive, and cooperative  Extremities right lower extremity is grossly well aligned and perfused, sensation intact, dressing clean, dry, and intact, plantar and dorsiflexion intact  Neurologic Mental Status orientated to person, place, time and situation    Discharge Medications     Discharge Medications        New Medications        Instructions Start Date   gabapentin 100 MG capsule  Commonly known as: NEURONTIN   100 mg, Oral, Nightly      HYDROcodone-acetaminophen 5-325 MG per tablet  Commonly known as: NORCO   1 tablet, Oral, Every 6 Hours PRN      tamsulosin 0.4 MG capsule 24 hr capsule  Commonly known as: FLOMAX   0.4 mg, Oral, Daily   Start Date: December 4, 2021            Continue These Medications        Instructions Start Date   amLODIPine 2.5 MG tablet  Commonly known as: NORVASC   2.5 mg, Oral, Daily, Take preop      clopidogrel 75 MG tablet  Commonly known as: PLAVIX   TAKE 1 TABLET EVERY DAY      Crestor 20 MG tablet  Generic drug: rosuvastatin   20 mg, Oral, Nightly      docusate sodium 100 MG capsule  Commonly known as: COLACE   200 mg, Oral, Daily, dont take preop       GUAIFENESIN PO   400 mg, Oral, Daily, Last dose 11/29       pantoprazole 40 MG EC tablet  Commonly known as: PROTONIX   40 mg, Oral, Nightly      polycarbophil 625 MG tablet tablet   1,250 mg, Oral, Daily, dont take preop      Vitamin D-1000 Max St 25 MCG (1000 UT) tablet  Generic drug: cholecalciferol   2,000 Units, Oral, Daily, Last dose 11/23      warfarin 7.5 MG tablet  Commonly known as: COUMADIN   7.5  mg, Oral, Weekly, Friday      warfarin 5 MG tablet  Commonly known as: COUMADIN   5 mg, Oral, Everyday EXCEPT FRIDAY                Activity at Discharge:   Activity Instructions       Discharge Activity      Anterior Total Hip Replacement Discharge Instructions:    I. ACTIVITIES:  1. Exercises:  Complete exercise program as taught by the hospital physical therapist 2 times per day  Exercise program will be advanced by the physical therapist  During the day be up ambulating every 2 hours (while awake) for short distances  Complete the ankle pump exercises at least 10 times per hour (while awake)  Elevate legs when in bed and for at least 30 minutes during the day.Use cold packs 20-30 minutes approximately 5 times per day. This should be done before and after completing your exercises and at any time you are experiencing pain/ stiffness in your operative extremity.      2. Activities of Daily Living:  No tub baths, hot tubs, or swimming pools for 4 weeks  May shower with waterproof dressing in place, as long as it is nice and tight around the edges.  Do not scrub or rub the incision. Let water run over dressing and pat dry.     II. Restrictions  Continue anterior hip precautions as taught at the hospital  Your surgeon will discuss with you when you will be able to drive again, typically when you are off pain medication and have enough strength in your leg to step hard on the brake.  Weight bearing is as tolerated  Do not lift more than 10 lbs for the first 2 weeks.  First week stay inside on even terrain. May go up and down stairs one stair at a time utilizing the hand rail once cleared by physical therapy to do so.  After one week, you may venture outside (if cleared to do so by physical therapist).    III. Precautions:  Everyone that comes near you should wash their hands  No elective dental, genital-urinary, or colon procedures or surgical procedures for 12 weeks after surgery unless absolutely necessary.   If  dental work or surgical procedure is deemed absolutely necessary, you will need to contact your surgeon as you will need to take antibiotics 1 hour prior to any dental work (including teeth cleanings).  Please discuss with your surgeon prophylactic antibiotics as the length of time this intervention will be necessary for you varies with each patient's health history and situation.  Avoid sick people. If you must be around someone who is ill, they should wear a mask.  Avoid visits to the Emergency Room or Urgent Care unless you are having a life threatening event.   If ordered stockings are to be placed on in the morning and removed at night. Monitor the stockings to ensure that any swelling is not causing the stockings to become too tight. In this case, remove stockings immediately.    IV. INCISION CARE:  Wash your hands often.  Notify office if dressing becomes dislodged or drainage noted. Change the dressing as directed by your physician.   No creams or ointments to the incision, unless instructed.  Do not touch or pick at the incision  Check dressing every day and notify surgeon immediately if any of the following signs or symptoms are noted:  Increase in redness  Increase in swelling around the incision and of the entire extremity  Increase in pain  Drainage oozing from the incision  Increase in overall body temperature (greater than 101 degrees)  Your surgeon will instruct you regarding suture or staple removal    V. Medications:   1. Anticoagulants: You will be discharged on an anticoagulant. This is a prophylactic medication that helps prevent blood clots during your post-operative period. The type and length of dosage varies based on your individual needs, procedure performed, and surgeon's preference.  While taking the anticoagulant, you should avoid taking any additional aspirin, ibuprofen (Advil or Motrin), Aleve (Naprosyn) or other non-steroidal anti-inflammatory medications, unless prescribed.   Notify  surgeon immediately if any ivy bleeding is noted in the urine, stool, emesis, or from the nose or the incision. Blood in the stool will often appear as black rather than red. Blood in urine may appear as pink. Blood in emesis may appear as brown/black like coffee grounds.  You will need to apply pressure for longer periods of time to any cuts or abrasions to stop bleeding  Avoid alcohol while taking anticoagulants    2. Stool Softeners: You will be at greater risk of constipation after surgery due to being less mobile and the pain medications.   Take stool softeners as instructed by your surgeon while on pain medications. Over the counter Colace 100 mg 1-2 capsules twice daily.   If stools become too loose or too frequent, please decreases the dosage or stop the stool softener.  If constipation occurs despite use of stool softeners, you are to continue the stool softeners and add a laxative (Milk of Magnesia 1 ounce daily as needed)  Drink plenty of fluids, and eat fruits and vegetables during your recovery time    3. Pain Medications utilized after surgery are narcotics and the law requires that the following information be given to all patients that are prescribed narcotics:  CLASSIFICATION: Pain medications are called Opioids and are narcotics  LEGALITIES: It is illegal to share narcotics with others and to drive within 24 hours of taking narcotics  POTENTIAL SIDE EFFECTS: Potential side effects of opioids include: nausea, vomiting, itching, dizziness, drowsiness, dry mouth, constipation, and difficulty urinating.  POTENTIAL ADVERSE EFFECTS:   Opioid tolerance can develop with use of pain medications and this simply means that it requires more and more of the medication to control pain; however, this is seen more in patients that use opioids for longer periods of time.  Opioid dependence can develop with use of Opioids and this simply means that to stop the medication can cause withdrawal symptoms; however,  this is seen with patients that use Opioids for longer periods of time.  Opioid addiction can develop with use of Opioids and the incidence of this is very unlikely in patients who take the medications as ordered and stop the medications as instructed.  Opioid overdose can be dangerous, but is unlikely when the medication is taken as ordered and stopped when ordered. It is important not to mix opioids with alcohol or with and type of sedative such as Benadryl as this can lead to over sedation and respiratory difficulty.  DOSAGE:   Pain medications will need to be taken consistently for the first week to decrease pain and promote adequate pain relief and participation in physical therapy.  After the initial surgical pain begins to resolve, you may begin to decrease the pain medication. By the end of 6 weeks, you should be off of pain medications.  Refills will not be given by the office during evening hours, on weekends, or after 6 weeks post-op.  To seek refills on pain medications during the initial 6 week post-operative period, you must call the office 48 hours in advance to request the refill. The office will then notify you when to  the prescription. DO NOT wait until you are out of the medication to request a refill.    V. FOLLOW-UP VISITS:  You will need to follow up in the office with Dr. Frantz Hickman/ Kitty Michel in 2 weeks. Please call this number [phone] to schedule this appointment.  You will need to follow up with your primary care physician in 4 weeks.  If you have any concerns or suspected complications prior to your follow up visit, please call your surgeons office. Do not wait until your appointment time if you suspect complications. These will need to be addressed in the office promptly.            Follow-up Appointments  Future Appointments   Date Time Provider Department Center   12/10/2021  9:00 AM PR[facility], [facility]   12/15/2021 10:15 AM Kitty Michel PA  [facility]   1/7/2022  8:30 AM Froilan Luque MD [facility]   4/20/2022  8:30 AM Holly Spivey MD [facility]     Additional Instructions for the Follow-ups that You Need to Schedule       Discharge Follow-up with Specialty: Orthopedics; 2 Weeks   As directed      Specialty: Orthopedics    Follow Up: 2 Weeks    Follow Up Details: Return to the office to see Dr. Frantz Hickman/ Kitty Michel                 Test Results Pending at Discharge  Pending Labs       Order Current Status    Blood Culture - Blood, Arm, Right In process    Blood Culture - Blood, Hand, Left In process    Urinalysis With Microscopic - Urine, Clean Catch In process             POONAM Hernandez  12/03/21  08:50 EST  Frantz Hickman MD   I certify that this patient is under my care and that I had a face to face encounter that meets the physician face to face encounter requirements.    The encounter occurred on: 12/3/2021 08:50 EST    Based on the findings of the above encounter, I certify that this patient is confined to the home and needs intermittent skilled nursing care, physical therapy and/or speech therapy.  The patient is under my care, and I have initiated the establishment of the plan of care.    This patient will be followed by a physician who will, periodically, review the plan of care. The findings from this face to face encounter have been communicated with the patient's community based physician who will be assuming this patient's home health plan of care.    I also have provided the agency additional information to support the patient's homebound status and need for skilled care. (Examples of this information could include physician progress notes, discharge summaries, history and physical forms, operative reports, referral order, etc.)    EMR Dragon/Transcription disclaimer:  Much of this encounter note is an electronic transcription/translation of spoken language to printed text. The electronic  translation of spoken language may permit erroneous, or at times, nonsensical words or phrases to be inadvertently transcribed; Although I have reviewed the note for such errors, some may still exist.

## 2021-12-03 NOTE — PLAN OF CARE
Assessment: Junior MONI Robles presents with functional mobility impairments which indicate the need for skilled intervention. Pt with improved overall gait tolerance and stability noted this am. Continues to demo decreased heel strike on RLE and antalgic step to gait. Perseverating on need to have BM and urinate. Tolerating session today without incident. Will continue to follow and progress as tolerated.     Plan/Recommendations:   Pt would benefit from Inpatient Rehabilitation placement at discharge from facility and requires no DME at discharge.   Pt desires Inpatient Rehabilitation placement at discharge. Pt cooperative; agreeable to therapeutic recommendations and plan of care.

## 2021-12-03 NOTE — PROGRESS NOTES
"  [facility] DAILY PROGRESS NOTE    Patient Identification  Name: Junior MONI Robles  Age: 86 y.o.  Sex: male  :  1935  MRN: [id number]    Date: 12/3/2021             Subjective:  Interval History: Hayes clear yellow overnight, no acute  events    Objective:    Scheduled Meds:acetaminophen, 1,000 mg, Oral, Q8H  amLODIPine, 2.5 mg, Oral, Daily  calcium carbonate, 2 tablet, Oral, 4x Daily PC & at Bedtime  clopidogrel, 75 mg, Oral, Daily  docusate sodium, 200 mg, Oral, Daily  ferrous sulfate, 324 mg, Oral, Daily With Breakfast  gabapentin, 100 mg, Oral, Nightly  guaiFENesin, 400 mg, Oral, Daily  Nutrisource fiber, 1 packet, Oral, Daily  pantoprazole, 40 mg, Oral, Nightly  polyethylene glycol, 17 g, Oral, BID  rosuvastatin, 20 mg, Oral, Nightly  tamsulosin, 0.4 mg, Oral, Daily  warfarin, 5 mg, Oral, Once per day on Sun Mon Tue Wed Thu Sat  warfarin, 7.5 mg, Oral, Once per day on Fri      Continuous Infusions:sodium chloride, 100 mL/hr, Last Rate: 100 mL/hr (21 0632)      PRN Meds:diphenhydrAMINE **OR** diphenhydrAMINE  •  Morphine **AND** naloxone  •  ondansetron **OR** ondansetron  •  oxyCODONE  •  oxyCODONE  •  promethazine  •  traMADol  •  zolpidem    Vital signs in last 24 hours:  Temp:  [98.3 °F (36.8 °C)-100.7 °F (38.2 °C)] 99.8 °F (37.7 °C)  Heart Rate:  [86-91] 88  Resp:  [-29] 16  BP: (117-131)/(45-55) 131/45    Intake/Output:    Intake/Output Summary (Last 24 hours) at 12/3/2021 0748  Last data filed at 12/3/2021 0500  Gross per 24 hour   Intake 440 ml   Output 700 ml   Net -260 ml       Exam:  /45 (BP Location: Right arm, Patient Position: Lying)   Pulse 88   Temp 99.8 °F (37.7 °C) (Oral)   Resp 16   Ht 182.9 cm (72\")   Wt 61.4 kg (135 lb 5.8 oz)   SpO2 93%   BMI 18.36 kg/m²     General Appearance:    Alert, cooperative, no distress, appears stated age               Abdomen:     Soft, ND   :   Hayes clear yellow                  Data Review:  All labs (24hrs):   Recent Results " (from the past 24 hour(s))   Hemoglobin & Hematocrit, Blood    Collection Time: 12/02/21  3:05 PM    Specimen: Blood   Result Value Ref Range    Hemoglobin 9.0 (L) 13.0 - 17.7 g/dL    Hematocrit 26.0 (L) 37.5 - 51.0 %   CBC Auto Differential    Collection Time: 12/02/21  5:59 PM    Specimen: Blood   Result Value Ref Range    WBC 15.50 (H) 3.40 - 10.80 10*3/mm3    RBC 2.63 (L) 4.14 - 5.80 10*6/mm3    Hemoglobin 8.9 (L) 13.0 - 17.7 g/dL    Hematocrit 25.8 (L) 37.5 - 51.0 %    MCV 98.1 (H) 79.0 - 97.0 fL    MCH 33.8 (H) 26.6 - 33.0 pg    MCHC 34.4 31.5 - 35.7 g/dL    RDW 18.1 (H) 12.3 - 15.4 %    RDW-SD 59.9 (H) 37.0 - 54.0 fl    MPV 8.1 6.0 - 12.0 fL    Platelets 151 140 - 450 10*3/mm3    Neutrophil % 87.8 (H) 42.7 - 76.0 %    Lymphocyte % 5.4 (L) 19.6 - 45.3 %    Monocyte % 6.2 5.0 - 12.0 %    Eosinophil % 0.5 0.3 - 6.2 %    Basophil % 0.1 0.0 - 1.5 %    Neutrophils, Absolute 13.60 (H) 1.70 - 7.00 10*3/mm3    Lymphocytes, Absolute 0.80 0.70 - 3.10 10*3/mm3    Monocytes, Absolute 1.00 (H) 0.10 - 0.90 10*3/mm3    Eosinophils, Absolute 0.10 0.00 - 0.40 10*3/mm3    Basophils, Absolute 0.00 0.00 - 0.20 10*3/mm3    nRBC 0.1 0.0 - 0.2 /100 WBC   Prepare RBC, 1 Units    Collection Time: 12/03/21  2:00 AM   Result Value Ref Range    Product Code T6240Q59     Unit Number [id number]     UNIT  ABO O     UNIT  RH POS     Crossmatch Interpretation Compatible     Dispense Status PT     Blood Expiration Date [id number]     Blood Type Barcode 5100    Protime-INR    Collection Time: 12/03/21  2:43 AM    Specimen: Blood   Result Value Ref Range    Protime 13.3 (L) 19.4 - 28.5 Seconds    INR 1.22 (L) 2.00 - 3.00   Hemoglobin & Hematocrit, Blood    Collection Time: 12/03/21  2:43 AM    Specimen: Blood   Result Value Ref Range    Hemoglobin 8.5 (L) 13.0 - 17.7 g/dL    Hematocrit 25.3 (L) 37.5 - 51.0 %   Procalcitonin    Collection Time: 12/03/21  6:44 AM    Specimen: Blood   Result Value Ref Range    Procalcitonin 0.46 (H) 0.00 -  0.25 ng/mL   CBC Auto Differential    Collection Time: 12/03/21  6:44 AM    Specimen: Blood   Result Value Ref Range    WBC 12.80 (H) 3.40 - 10.80 10*3/mm3    RBC 2.66 (L) 4.14 - 5.80 10*6/mm3    Hemoglobin 8.9 (L) 13.0 - 17.7 g/dL    Hematocrit 25.6 (L) 37.5 - 51.0 %    MCV 96.5 79.0 - 97.0 fL    MCH 33.3 (H) 26.6 - 33.0 pg    MCHC 34.5 31.5 - 35.7 g/dL    RDW 17.9 (H) 12.3 - 15.4 %    RDW-SD 59.9 (H) 37.0 - 54.0 fl    MPV 7.7 6.0 - 12.0 fL    Platelets 144 140 - 450 10*3/mm3    Neutrophil % 89.1 (H) 42.7 - 76.0 %    Lymphocyte % 4.9 (L) 19.6 - 45.3 %    Monocyte % 5.3 5.0 - 12.0 %    Eosinophil % 0.5 0.3 - 6.2 %    Basophil % 0.2 0.0 - 1.5 %    Neutrophils, Absolute 11.40 (H) 1.70 - 7.00 10*3/mm3    Lymphocytes, Absolute 0.60 (L) 0.70 - 3.10 10*3/mm3    Monocytes, Absolute 0.70 0.10 - 0.90 10*3/mm3    Eosinophils, Absolute 0.10 0.00 - 0.40 10*3/mm3    Basophils, Absolute 0.00 0.00 - 0.20 10*3/mm3    nRBC 0.0 0.0 - 0.2 /100 WBC      Imaging Results (Last 24 Hours)     ** No results found for the last 24 hours. **           Assessment:    Primary osteoarthritis of right hip    Status post total hip replacement, left    Moderate malnutrition (CMS/HCC)      Retention  History of rezum    Plan:    Continue Flomax  Voiding trial today  Call with questions or concerns    Josiah Hameed MD  [facility]  [address], [address]  [place] [address]  [phone]  12/03/21  07:48 EST

## 2021-12-03 NOTE — PLAN OF CARE
Goal Outcome Evaluation:           Progress: improving  Outcome Summary: Patient has been doing well tonight, He has had little complaints of pain. He has been doing good with his IS. Will continue to monitor patient.

## 2021-12-03 NOTE — PROGRESS NOTES
[facility] Medicine Services Daily Progress Note    Patient Name: Junior MONI Robles  : 1935  MRN: [id number]  Primary Care Physician:  Chace Cano MD  Date of admission: 2021      Subjective      Chief Complaint: Hip surgery    History of Present Illness:  Junior MONI Robles is a 86 y.o. male patient of Dr. Spivey who was given medical clearance for this planned procedure.  The patient has a history of severe osteoarthritis of both hips.  He is status post previous left hip replacement.  The patient is now admitted for right hip replacement.  He was advised to hold Coumadin for 5 days prior to surgery & to hold Plavix 5 to 7 days before surgery.  Patient was otherwise cleared for surgery.     Medical consultation requested by Dr. Hickman.  Patient has been on warfarin for problems including sick sinus syndrome and previous pacemaker placement.  History of ablation for AV roc reentry tachycardia.  History of coronary bypass surgery.  History of coronary artery disease.  History of chronic atrial fibrillation.     Patient previously was intolerant to sotalol and was intolerant to amiodarone as well.  Since the AV ablation he has apparently been doing well.      Patient Reports:  2021: Expected postop hip pain.  2021: No additional problems.  Patient continues to have severe hearing loss.  Patient plans to get up later this afternoon in the chair.  12/3/2021: Patient is sitting up in a chair. He continues to have difficulty with hearing. Patient Hayes catheter has been removed. He has no other complaints except related to difficulty with hearing because everyone is wearing a mask.    ROS   No new complaints today.      Objective      Vitals:   Temp:  [98.1 °F (36.7 °C)-100.7 °F (38.2 °C)] 98.1 °F (36.7 °C)  Heart Rate:  [83-91] 83  Resp:  [12-16] 12  BP: (117-131)/(45-50) 119/45    Physical Exam   Physical Exam  Vitals and nursing note reviewed.   Constitutional:        Appearance: Normal appearance. He is normal weight. He was sitting up in bedside chair without any noted trouble.   HENT:      Head: Normocephalic and atraumatic.      Nose: Nose normal.      Mouth/Throat:      Mouth: Mucous membranes are moist.      Comments: Appears edentulous  Eyes:      Extraocular Movements: Extraocular movements intact.      Pupils: Pupils are equal, round, and reactive to light.   Cardiovascular:      Rate and Rhythm: Normal rate and regular rhythm.      Pulses: Normal pulses.      Heart sounds: Normal heart sounds.   Pulmonary:      Effort: Pulmonary effort is normal.   Abdominal:      General: Bowel sounds are normal. There is no distension.      Palpations: Abdomen is soft. There is no mass.      Tenderness: There is no abdominal tenderness.   Genitourinary:  Hayes catheter has been removed.  Musculoskeletal:         General: Normal range of motion.      Cervical back: Normal range of motion.   Skin:     General: Skin is warm and dry.      Capillary Refill: Capillary refill takes less than 2 seconds.   Neurological:      General: No focal deficit present.      Mental Status: He is alert and oriented to person, place, and time.      Comments: Significant hearing loss noted   Psychiatric:         Mood and Affect: Mood normal.         Behavior: Behavior normal.         Thought Content: Thought content normal.         Judgment: Judgment normal.        Result Review    Result Review:  I have personally reviewed the results from the time of this admission to 12/3/2021 13:12 EST and agree with these findings:  [x]  Laboratory  [x]  Microbiology  [x]  Radiology  []  EKG/Telemetry   []  Cardiology/Vascular   []  Pathology  []  Old records  []  Other:  Most notable findings include: Hearing loss and osteoarthritis    Wounds (last 24 hours)     LDA Wound     Row Name 12/03/21 0702 12/02/21 1958          Wound 11/30/21 1050 Right anterior greater trochanter    Wound - Properties Group Placement Date:  11/30/21  -CW Placement Time: 1050  -CW Present on Hospital Admission: Y  -CW Side: Right  -CW Orientation: anterior  -CW Location: greater trochanter  -CW     Dressing Appearance dry; intact  -SC dry; intact  -SR     Closure MARIANO  -SC MARIANO  -SR     Base dressing in place, unable to visualize  -SC dressing in place, unable to visualize  -SR     Retired Wound - Properties Group Date first assessed: 11/30/21  -CW Time first assessed: 1050  -CW Present on Hospital Admission: Y  -CW Side: Right  -CW Location: greater trochanter  -CW           User Key  (r) = Recorded By, (t) = Taken By, (c) = Cosigned By    Initials Name Provider Type    CW Ivon Wilkins, RN Registered Nurse    Martha Shields LPN Licensed Nurse    SR Haylee Shaikh RN Registered Nurse                  Assessment/Plan      Brief Patient Summary:  Junior MONI Robles is a 86 y.o. male who presented for elective repair.  Patient has chronic medical problems which appears to be overall stable.  Patient did develop some acute urinary retention postop.  He was seen by urology.      acetaminophen, 1,000 mg, Oral, Q8H  amLODIPine, 2.5 mg, Oral, Daily  calcium carbonate, 2 tablet, Oral, 4x Daily PC & at Bedtime  clopidogrel, 75 mg, Oral, Daily  docusate sodium, 200 mg, Oral, Daily  ferrous sulfate, 324 mg, Oral, Daily With Breakfast  gabapentin, 100 mg, Oral, Nightly  guaiFENesin, 400 mg, Oral, Daily  Nutrisource fiber, 1 packet, Oral, Daily  pantoprazole, 40 mg, Oral, Nightly  polyethylene glycol, 17 g, Oral, BID  rosuvastatin, 20 mg, Oral, Nightly  tamsulosin, 0.4 mg, Oral, Daily  warfarin, 5 mg, Oral, Once per day on Sun Mon Tue Wed Thu Sat  warfarin, 7.5 mg, Oral, Once per day on Fri       sodium chloride, 100 mL/hr, Last Rate: 100 mL/hr (12/02/21 0632)         Active Hospital Problems:  Active Hospital Problems    Diagnosis    • Moderate malnutrition (CMS/HCC)    • Status post total hip replacement, left      Assessment/plan:     Coronary artery  disease:  Status post CABG and previous PCI stenting.    Patient remains free of any coronary symptoms.    Will plan to monitor for evidence of any acute coronary symptoms.     History of recurrent & paroxysmal atrial fibrillation  Status post ablation procedure after recurrent episodes of palpitations  dual-chamber pacemaker in situ with normal function  Intolerant to amiodarone and sotalol  Doing well now with minimal palpitations.   Patient be restarted on warfarin tonight.  Pharmacy is managing.  12/1/2021: Patient restarted on warfarin last night.  12/2/2021: INR remains subtherapeutic.  Dosing per pharmacy.  12/3/2021: INR remains low at 1.22     History of symptomatic palpitations  Noted to have history suggestive of AV roc reentrant tachycardia  He is currently post ablation with complete resolution of symptoms      Essential hypertension    Blood pressure well controlled on current medications.      Ischemic cardiomyopathy with LV ejection fraction of 45%  Abnormal stress test with prior myocardial infarction with no significant reversible ischemia  12/2/2021: No signs of worsening CHF or exacerbation of CHF or heart failure.    Postop urinary retention:  Patient started on tamsulosin.  Followed by urology  12/2/2021: Patient continues on tamsulosin.  Hayes still in place.  Possible voiding trial in the a.m.  12/3/2021: Hayes catheter has been removed.    Postop anemia:  Elevated white count  12/3/2021: Hemoglobin remains low at 8.5 g. 8.9 g yesterday  White count elevation most likely due to stress. He has intermittent cough but no evidence suggestive of pneumonia. Continues to trend downward. Lungs does not sound like pneumonia but definitely has some mild congestion.    DVT prophylaxis:  Medical and mechanical DVT prophylaxis orders are present.    CODE STATUS:    Code Status (Patient has no pulse and is not breathing): CPR (Attempt to Resuscitate)  Medical Interventions (Patient has pulse or is  breathing): Full Support      Disposition:  I expect patient to be discharged 2 to 3 days.    This patient has been examined wearing appropriate Personal Protective Equipment and as recommended by the CDC. 12/03/21      Electronically signed by Beto Hollingsworth MD, 12/03/21, 13:12 EST.  [facility] Hospitalist Team

## 2021-12-04 VITALS
HEART RATE: 81 BPM | DIASTOLIC BLOOD PRESSURE: 69 MMHG | HEIGHT: 72 IN | TEMPERATURE: 97.5 F | WEIGHT: 135.36 LBS | SYSTOLIC BLOOD PRESSURE: 125 MMHG | RESPIRATION RATE: 17 BRPM | BODY MASS INDEX: 18.33 KG/M2 | OXYGEN SATURATION: 97 %

## 2021-12-04 LAB
HCT VFR BLD AUTO: 23.4 % (ref 37.5–51)
HGB BLD-MCNC: 7.9 G/DL (ref 13–17.7)
INR PPP: 1.28 (ref 2–3)
PROTHROMBIN TIME: 14 SECONDS (ref 19.4–28.5)
SARS-COV-2 RNA PNL SPEC NAA+PROBE: NOT DETECTED

## 2021-12-04 PROCEDURE — 85018 HEMOGLOBIN: CPT | Performed by: PHYSICIAN ASSISTANT

## 2021-12-04 PROCEDURE — 97110 THERAPEUTIC EXERCISES: CPT

## 2021-12-04 PROCEDURE — 85014 HEMATOCRIT: CPT | Performed by: PHYSICIAN ASSISTANT

## 2021-12-04 PROCEDURE — 97116 GAIT TRAINING THERAPY: CPT

## 2021-12-04 PROCEDURE — 99239 HOSP IP/OBS DSCHRG MGMT >30: CPT | Performed by: INTERNAL MEDICINE

## 2021-12-04 PROCEDURE — U0003 INFECTIOUS AGENT DETECTION BY NUCLEIC ACID (DNA OR RNA); SEVERE ACUTE RESPIRATORY SYNDROME CORONAVIRUS 2 (SARS-COV-2) (CORONAVIRUS DISEASE [COVID-19]), AMPLIFIED PROBE TECHNIQUE, MAKING USE OF HIGH THROUGHPUT TECHNOLOGIES AS DESCRIBED BY CMS-2020-01-R: HCPCS | Performed by: ORTHOPAEDIC SURGERY

## 2021-12-04 PROCEDURE — 85610 PROTHROMBIN TIME: CPT | Performed by: PHYSICIAN ASSISTANT

## 2021-12-04 RX ADMIN — GUAIFENESIN 400 MG: 200 TABLET ORAL at 09:26

## 2021-12-04 RX ADMIN — AMLODIPINE BESYLATE 2.5 MG: 2.5 TABLET ORAL at 09:26

## 2021-12-04 RX ADMIN — DOCUSATE SODIUM 200 MG: 100 CAPSULE, LIQUID FILLED ORAL at 09:26

## 2021-12-04 RX ADMIN — POLYETHYLENE GLYCOL 3350 17 G: 17 POWDER, FOR SOLUTION ORAL at 09:26

## 2021-12-04 RX ADMIN — CLOPIDOGREL BISULFATE 75 MG: 75 TABLET ORAL at 09:26

## 2021-12-04 RX ADMIN — CALCIUM CARBONATE (ANTACID) CHEW TAB 500 MG 2 TABLET: 500 CHEW TAB at 09:26

## 2021-12-04 RX ADMIN — Medication 1 PACKET: at 09:28

## 2021-12-04 RX ADMIN — POLYETHYLENE GLYCOL 3350 17 G: 17 POWDER, FOR SOLUTION ORAL at 00:07

## 2021-12-04 RX ADMIN — ACETAMINOPHEN 1000 MG: 500 TABLET, FILM COATED ORAL at 09:00

## 2021-12-04 RX ADMIN — ACETAMINOPHEN 1000 MG: 500 TABLET, FILM COATED ORAL at 00:07

## 2021-12-04 RX ADMIN — TAMSULOSIN HYDROCHLORIDE 0.4 MG: 0.4 CAPSULE ORAL at 09:26

## 2021-12-04 RX ADMIN — FERROUS SULFATE TAB EC 324 MG (65 MG FE EQUIVALENT) 324 MG: 324 (65 FE) TABLET DELAYED RESPONSE at 09:00

## 2021-12-04 RX ADMIN — TRAMADOL HYDROCHLORIDE 50 MG: 50 TABLET, COATED ORAL at 09:26

## 2021-12-04 NOTE — PLAN OF CARE
Goal Outcome Evaluation:  Plan of Care Reviewed With: patient        Progress: improving    Patient doing well. Will be discharging soon. Will monitor until then.

## 2021-12-04 NOTE — THERAPY TREATMENT NOTE
Subjective: Pt agreeable to therapeutic plan of care.    Objective:     Bed mobility - Min-A  Transfers - Min-A and with rolling walker  Ambulation - 60 feet Min-A and with rolling walker    Pain: a little pain R hip  Education: Provided education on importance of mobility and skilled verbal / tactile cueing throughout intervention.     Assessment: Junior MONI Robles presents with functional mobility impairments which indicate the need for skilled intervention. Tolerating session today without incident.Pt very motivated to be more mobile. Performed supine and seated LE exs. WBAT RLE. Dependent on UE's, sl antalgic and flexed trunk.  Plans are to dc to rehab. Will continue to follow and progress as tolerated.     Plan/Recommendations:   Pt would benefit from Inpatient Rehabilitation placement at discharge from facility and requires no DME at discharge.   Pt desires Inpatient Rehabilitation placement at discharge. Pt cooperative; agreeable to therapeutic recommendations and plan of care.     Basic Mobility 6-click:  Rollin = Total, A lot = 2, A little = 3; 4 = None  Supine>Sit:   1 = Total, A lot = 2, A little = 3; 4 = None   Sit>Stand with arms:  1 = Total, A lot = 2, A little = 3; 4 = None  Bed>Chair:   1 = Total, A lot = 2, A little = 3; 4 = None  Ambulate in room:  1 = Total, A lot = 2, A little = 3; 4 = None  3-5 Steps with railin = Total, A lot = 2, A little = 3; 4 = None  Score: 16        Post-Tx Position: Up in Chair, Alarms activated and Call light and personal items within reach, ice pack on R hip  PPE: gloves, surgical mask, eyewear protection

## 2021-12-04 NOTE — PLAN OF CARE
Goal Outcome Evaluation:           Progress: improving  Outcome Summary: patient has been using the bedside comode multiple times. Patiet has voided multiple times this shift with no problems. He is eager to leave and go to rehab. VSS will continue to monitor patient.   Patient reports urinating excessively about 6 months ago. Found to have a calculus during a CAT scan of his pelvis one week ago. Patient reports he is to have the calculus lasered during cystoscopy.

## 2021-12-04 NOTE — PLAN OF CARE
ssessment: Junior MONI Robles presents with functional mobility impairments which indicate the need for skilled intervention. Tolerating session today without incident.Pt very motivated to be more mobile. Performed supine and seated LE exs. WBAT RLE. Dependent on UE's, sl antalgic and flexed trunk.  Plans are to dc to rehab. Will continue to follow and progress as tolerated.

## 2021-12-04 NOTE — CASE MANAGEMENT/SOCIAL WORK
Continued Stay Note  [facility]     Patient Name: Junior MONI Robles  MRN: [id number]  Today's Date: 12/4/2021    Admit Date: 11/30/2021     Discharge Plan     Row Name 12/04/21 1512       Plan    Plan [facility]    Plan Comments confirmed that pt is ok to go to [facility] today per liasion. rapid covid test required; order placed and notified nursing staff.  pharm updated; pplan for spouse to transport to [facility]                       Expected Discharge Date and Time     Expected Discharge Date Expected Discharge Time    Dec 4, 2021         Phone communication or documentation only - no physical contact with patient or family.      Jeimy Hernandez RN

## 2021-12-04 NOTE — DISCHARGE SUMMARY
[facility]   DISCHARGE SUMMARY    Patient Name: Junior MONI Robles  : 1935  MRN: [id number]    Date of Admission: 2021  Date of Discharge:  2021  Primary Care Physician: Chace Cano MD    Consults     Date and Time Order Name Status Description    2021 12:38 PM Inpatient Urology Consult Completed     2021  3:30 PM Inpatient Hospitalist Consult Completed           Hospital Course     Presenting Problem:   Primary osteoarthritis of right hip [M16.11]  Status post total hip replacement, left [Z96.642]    Active Hospital Problems:  Primary osteoarthritis of right hip  History of left total hip replacement  Coronary artery disease, without exacerbation  Chronic atrial fibrillation on anticoagulation therapy  Moderate malnutrition  History of pacemaker placement  History of previous myocardial infarction    Resolved Hospital Problems:  No notes have been filed under this hospital service.  Service: Hospitalist    Hospital Course:  Junior MONI Robles is a 86 y.o. male was admitted by Dr. Hickman for right total hip replacement.  Please see Dr. Hickman's discharge summary from yesterday regarding hospital course and treatment.    Over the next 24 hours the patient had no additional complications.  The patient had a bed available on 2021 instead of 12/3/2021.  Patient was discharged without any changes in his medication.  Medication reconciliation was updated and confirmed as previous discharge summary.    Of note, the patient had episode of postop urinary retention.  He underwent cystoscopy which showed distended bladder with slightly narrowed bladder neck but a 12 Bulgarian Hayes was placed easily by the urologist.  The patient prior to discharge had been started on Flomax and the patient underwent voiding trial successfully.    Patient also required 1 unit of packed red cells when his hemoglobin was low.    Discharge Follow Up Recommendations for labs/diagnostics:  Please see Dr. Hickman's  discharge summary from yesterday regarding discharge follow-up labs and follow-up disposition.    Day of Discharge     HPI: The patient had no complaints of the day of discharge.  He was feeling well.  No new problems.  Continues to have severe hearing loss.    Vital Signs:  Temp:  [97.5 °F (36.4 °C)-99.2 °F (37.3 °C)] 97.5 °F (36.4 °C)  Heart Rate:  [81-94] 81  Resp:  [17-25] 17  BP: (124-127)/(50-69) 125/69    Physical Exam:  Physical Exam  Vitals and nursing note reviewed.   Constitutional:       Appearance: Normal appearance. He is normal weight.   Elevated without any noted trouble.   HENT:      Head: Normocephalic and atraumatic.      Nose: Nose normal.      Mouth/Throat:      Mouth: Mucous membranes are moist.      Comments: Appears edentulous  Eyes:      Extraocular Movements: Extraocular movements intact.      Pupils: Pupils are equal, round, and reactive to light.   Cardiovascular:      Rate and Rhythm: Normal rate and regular rhythm.      Pulses: Normal pulses.      Heart sounds: Normal heart sounds.   Pulmonary:      Effort: Pulmonary effort is normal.   Abdominal:      General: Bowel sounds are normal. There is no distension.      Palpations: Abdomen is soft. There is no mass.      Tenderness: There is no abdominal tenderness.   Genitourinary:  Hayes catheter has been removed.  Musculoskeletal:         General: Normal range of motion.      Cervical back: Normal range of motion.   Moves extremities well.  No neurological deficits.  Skin:     General: Skin is warm and dry.      Capillary Refill: Capillary refill takes less than 2 seconds.   Neurological:      General: No focal deficit present.      Mental Status: He is alert and oriented to person, place, and time.      Comments: Significant hearing loss noted   Psychiatric:         Mood and Affect: Mood normal.         Behavior: Behavior normal.         Thought Content: Thought content normal.         Judgment: Judgment normal.       Pertinent  and/or  Most Recent Results     LAB RESULTS:          Lab 12/04/21  0333 12/03/21  0644 12/03/21  0243 12/02/21  1759 12/02/21  1505 12/02/21  0255 12/02/21  0255 12/01/21  1037 12/01/21 0243 11/30/21  1638 11/30/21  0812 11/30/21  0812   WBC  --  12.80*  --  15.50*  --   --   --   --   --   --   --   --    HEMOGLOBIN 7.9* 8.9* 8.5* 8.9* 9.0*   < > 7.7*   < > 7.4*  --   --   --    HEMATOCRIT 23.4* 25.6* 25.3* 25.8* 26.0*   < > 22.7*   < > 21.1*  --   --   --    PLATELETS  --  144  --  151  --   --   --   --   --   --   --   --    NEUTROS ABS  --  11.40*  --  13.60*  --   --   --   --   --   --   --   --    LYMPHS ABS  --  0.60*  --  0.80  --   --   --   --   --   --   --   --    MONOS ABS  --  0.70  --  1.00*  --   --   --   --   --   --   --   --    EOS ABS  --  0.10  --  0.10  --   --   --   --   --   --   --   --    MCV  --  96.5  --  98.1*  --   --   --   --   --   --   --   --    PROCALCITONIN  --  0.46*  --   --   --   --   --   --   --   --   --   --    PROTIME 14.0*  --  13.3*  --   --   --  13.2*  --  12.2* 12.4*   < > 11.9*   APTT  --   --   --   --   --   --   --   --   --   --   --  28.4    < > = values in this interval not displayed.         Lab 12/02/21  0255 12/01/21 0243 11/30/21 0812   SODIUM 134* 135*  --    POTASSIUM 4.3 4.4  --    CHLORIDE 102 101  --    CO2 24.0 25.0  --    ANION GAP 8.0 9.0  --    BUN 22 21  --    CREATININE 1.28* 1.21  --    GLUCOSE 116* 130*  --    CALCIUM 7.5* 7.5*  --    HEMOGLOBIN A1C  --   --  5.5             Lab 12/04/21  0333 12/03/21  0243 12/02/21  0255 12/01/21  0243 11/30/21  1638   PROTIME 14.0* 13.3* 13.2* 12.2* 12.4*   INR 1.28* 1.22* 1.21* 1.11* 1.13*                 Brief Urine Lab Results     None        Microbiology Results (last 10 days)     Procedure Component Value - Date/Time    COVID PRE-OP / PRE-PROCEDURE SCREENING ORDER (NO ISOLATION) - Swab, Nasopharynx [[id number]]  (Normal) Collected: 12/04/21 1045    Lab Status: Final result Specimen: Swab from  Nasopharynx Updated: 12/04/21 1135    Narrative:      The following orders were created for panel order COVID PRE-OP / PRE-PROCEDURE SCREENING ORDER (NO ISOLATION) - Swab, Nasopharynx.  Procedure                               Abnormality         Status                     ---------                               -----------         ------                     COVID-19,CEPHEID/FAREED/BD...[[id number]]  Normal              Final result                 Please view results for these tests on the individual orders.    COVID-19,CEPHEID/FAREED/BDMAX,COR/ABDIEL/PAD/SKY IN-HOUSE(OR EMERGENT/ADD-ON),NP SWAB IN TRANSPORT MEDIA 3-4 HR TAT, RT-PCR - Swab, Nasopharynx [[id number]]  (Normal) Collected: 12/04/21 1045    Lab Status: Final result Specimen: Swab from Nasopharynx Updated: 12/04/21 1135     COVID19 Not Detected    Narrative:      Fact sheet for providers: https://www.fda.gov/media/907802/download     Fact sheet for patients: https://www.fda.gov/media/820355/download  Fact sheet for providers: https://www.fda.gov/media/967382/download     Fact sheet for patients: https://www.fda.gov/media/450459/download    Blood Culture - Blood, Arm, Right [[id number]]  (Normal) Collected: 12/03/21 0644    Lab Status: Preliminary result Specimen: Blood from Arm, Right Updated: 12/04/21 0700     Blood Culture No growth at 24 hours    Blood Culture - Blood, Hand, Left [[id number]]  (Normal) Collected: 12/03/21 0644    Lab Status: Preliminary result Specimen: Blood from Hand, Left Updated: 12/04/21 0700     Blood Culture No growth at 24 hours    COVID PRE-OP / PRE-PROCEDURE SCREENING ORDER (NO ISOLATION) - Swab, Nasopharynx [[id number]]  (Normal) Collected: 11/27/21 0956    Lab Status: Final result Specimen: Swab from Nasopharynx Updated: 11/27/21 1622    Narrative:      The following orders were created for panel order COVID PRE-OP / PRE-PROCEDURE SCREENING ORDER (NO ISOLATION) - Swab, Nasopharynx.  Procedure                               Abnormality          Status                     ---------                               -----------         ------                     COVID-19,APTIMA PANTHER(...[[id number]]  Normal              Final result                 Please view results for these tests on the individual orders.    COVID-19,APTIMA PANTHER(LES), BEA, NP/OP SWAB IN UTM/VTM/SALINE TRANSPORT MEDIA,24 HR TAT - Swab, Nasopharynx [[id number]]  (Normal) Collected: 11/27/21 0956    Lab Status: Final result Specimen: Swab from Nasopharynx Updated: 11/27/21 1622     COVID19 Not Detected    Narrative:      Fact sheet for providers: https://www.fda.gov/media/395835/download     Fact sheet for patients: https://www.fda.gov/media/081829/download    Test performed by RT PCR.          XR Chest 2 View    Result Date: 11/17/2021  Impression: Stable severe emphysema and postoperative findings without acute cardiopulmonary abnormality.  Electronically Signed By-Josiah Salinas MD On:11/17/2021 9:58 AM This report was finalized on [id number] by  Josiah Salinas MD.    XR Pelvis 1 or 2 View    Result Date: 11/30/2021  Impression: Normal postoperative appearance of right hip arthroplasty.  Electronically Signed By-Yaron Wheeler On:11/30/2021 1:06 PM This report was finalized on [id number] by  Yaron Wheeler, .              Results for orders placed during the hospital encounter of 05/26/21    Adult Transthoracic Echo Complete W/ Cont if Necessary Per Protocol    Interpretation Summary  · The left ventricular cavity is mildly dilated.  · Left ventricular wall thickness is consistent with mild concentric hypertrophy.  · Estimated left ventricular EF = 45% Left ventricular systolic function is mildly decreased.  · The following left ventricular wall segments are hypokinetic: mid anterior, apical anterior, apical septal, mid inferoseptal, apex hypokinetic and mid anteroseptal.  · Estimated right ventricular systolic pressure from tricuspid regurgitation is mildly elevated (35-45  mmHg).  · Mild pulmonary hypertension is present.  · Left ventricular diastolic function is consistent with (grade II w/high LAP) pseudonormalization.      Pending Labs     Order Current Status    Urinalysis With Microscopic - Urine, Clean Catch In process    Blood Culture - Blood, Arm, Right Preliminary result    Blood Culture - Blood, Hand, Left Preliminary result        Discharge Details        Discharge Medications      New Medications      Instructions Start Date   gabapentin 100 MG capsule  Commonly known as: NEURONTIN   100 mg, Oral, Nightly      HYDROcodone-acetaminophen 5-325 MG per tablet  Commonly known as: NORCO   1 tablet, Oral, Every 6 Hours PRN      tamsulosin 0.4 MG capsule 24 hr capsule  Commonly known as: FLOMAX   0.4 mg, Oral, Daily         Continue These Medications      Instructions Start Date   amLODIPine 2.5 MG tablet  Commonly known as: NORVASC   2.5 mg, Oral, Daily, Take preop      clopidogrel 75 MG tablet  Commonly known as: PLAVIX   TAKE 1 TABLET EVERY DAY      Crestor 20 MG tablet  Generic drug: rosuvastatin   20 mg, Oral, Nightly      docusate sodium 100 MG capsule  Commonly known as: COLACE   200 mg, Oral, Daily, dont take preop       GUAIFENESIN PO   400 mg, Oral, Daily, Last dose 11/29       pantoprazole 40 MG EC tablet  Commonly known as: PROTONIX   40 mg, Oral, Nightly      polycarbophil 625 MG tablet tablet   1,250 mg, Oral, Daily, dont take preop      Vitamin D-1000 Max St 25 MCG (1000 UT) tablet  Generic drug: cholecalciferol   2,000 Units, Oral, Daily, Last dose 11/23      warfarin 7.5 MG tablet  Commonly known as: COUMADIN   7.5 mg, Oral, Weekly, Friday      warfarin 5 MG tablet  Commonly known as: COUMADIN   5 mg, Oral, Everyday EXCEPT FRIDAY              Allergies   Allergen Reactions   • Valsartan Other (See Comments)     Weakness, loss of appetite    • Atorvastatin Calcium Myalgia   • Codeine Other (See Comments)     Lightheaded          Discharge Disposition:  Rehab  Facility or Unit (DC - External)    Diet:  Hospital:  Diet Order   Procedures   • Diet Regular         Discharge Activity:   Activity Instructions     Discharge Activity      Anterior Total Hip Replacement Discharge Instructions:    I. ACTIVITIES:  1. Exercises:  Complete exercise program as taught by the hospital physical therapist 2 times per day  Exercise program will be advanced by the physical therapist  During the day be up ambulating every 2 hours (while awake) for short distances  Complete the ankle pump exercises at least 10 times per hour (while awake)  Elevate legs when in bed and for at least 30 minutes during the day.Use cold packs 20-30 minutes approximately 5 times per day. This should be done before and after completing your exercises and at any time you are experiencing pain/ stiffness in your operative extremity.      2. Activities of Daily Living:  No tub baths, hot tubs, or swimming pools for 4 weeks  May shower with waterproof dressing in place, as long as it is nice and tight around the edges.  Do not scrub or rub the incision. Let water run over dressing and pat dry.     II. Restrictions  Continue anterior hip precautions as taught at the hospital  Your surgeon will discuss with you when you will be able to drive again, typically when you are off pain medication and have enough strength in your leg to step hard on the brake.  Weight bearing is as tolerated  Do not lift more than 10 lbs for the first 2 weeks.  First week stay inside on even terrain. May go up and down stairs one stair at a time utilizing the hand rail once cleared by physical therapy to do so.  After one week, you may venture outside (if cleared to do so by physical therapist).    III. Precautions:  Everyone that comes near you should wash their hands  No elective dental, genital-urinary, or colon procedures or surgical procedures for 12 weeks after surgery unless absolutely necessary.   If dental work or surgical procedure is  deemed absolutely necessary, you will need to contact your surgeon as you will need to take antibiotics 1 hour prior to any dental work (including teeth cleanings).  Please discuss with your surgeon prophylactic antibiotics as the length of time this intervention will be necessary for you varies with each patient's health history and situation.  Avoid sick people. If you must be around someone who is ill, they should wear a mask.  Avoid visits to the Emergency Room or Urgent Care unless you are having a life threatening event.   If ordered stockings are to be placed on in the morning and removed at night. Monitor the stockings to ensure that any swelling is not causing the stockings to become too tight. In this case, remove stockings immediately.    IV. INCISION CARE:  Wash your hands often.  Notify office if dressing becomes dislodged or drainage noted. Change the dressing as directed by your physician.   No creams or ointments to the incision, unless instructed.  Do not touch or pick at the incision  Check dressing every day and notify surgeon immediately if any of the following signs or symptoms are noted:  Increase in redness  Increase in swelling around the incision and of the entire extremity  Increase in pain  Drainage oozing from the incision  Increase in overall body temperature (greater than 101 degrees)  Your surgeon will instruct you regarding suture or staple removal    V. Medications:   1. Anticoagulants: You will be discharged on an anticoagulant. This is a prophylactic medication that helps prevent blood clots during your post-operative period. The type and length of dosage varies based on your individual needs, procedure performed, and surgeon's preference.  While taking the anticoagulant, you should avoid taking any additional aspirin, ibuprofen (Advil or Motrin), Aleve (Naprosyn) or other non-steroidal anti-inflammatory medications, unless prescribed.   Notify surgeon immediately if any ivy  bleeding is noted in the urine, stool, emesis, or from the nose or the incision. Blood in the stool will often appear as black rather than red. Blood in urine may appear as pink. Blood in emesis may appear as brown/black like coffee grounds.  You will need to apply pressure for longer periods of time to any cuts or abrasions to stop bleeding  Avoid alcohol while taking anticoagulants    2. Stool Softeners: You will be at greater risk of constipation after surgery due to being less mobile and the pain medications.   Take stool softeners as instructed by your surgeon while on pain medications. Over the counter Colace 100 mg 1-2 capsules twice daily.   If stools become too loose or too frequent, please decreases the dosage or stop the stool softener.  If constipation occurs despite use of stool softeners, you are to continue the stool softeners and add a laxative (Milk of Magnesia 1 ounce daily as needed)  Drink plenty of fluids, and eat fruits and vegetables during your recovery time    3. Pain Medications utilized after surgery are narcotics and the law requires that the following information be given to all patients that are prescribed narcotics:  CLASSIFICATION: Pain medications are called Opioids and are narcotics  LEGALITIES: It is illegal to share narcotics with others and to drive within 24 hours of taking narcotics  POTENTIAL SIDE EFFECTS: Potential side effects of opioids include: nausea, vomiting, itching, dizziness, drowsiness, dry mouth, constipation, and difficulty urinating.  POTENTIAL ADVERSE EFFECTS:   Opioid tolerance can develop with use of pain medications and this simply means that it requires more and more of the medication to control pain; however, this is seen more in patients that use opioids for longer periods of time.  Opioid dependence can develop with use of Opioids and this simply means that to stop the medication can cause withdrawal symptoms; however, this is seen with patients that use  Opioids for longer periods of time.  Opioid addiction can develop with use of Opioids and the incidence of this is very unlikely in patients who take the medications as ordered and stop the medications as instructed.  Opioid overdose can be dangerous, but is unlikely when the medication is taken as ordered and stopped when ordered. It is important not to mix opioids with alcohol or with and type of sedative such as Benadryl as this can lead to over sedation and respiratory difficulty.  DOSAGE:   Pain medications will need to be taken consistently for the first week to decrease pain and promote adequate pain relief and participation in physical therapy.  After the initial surgical pain begins to resolve, you may begin to decrease the pain medication. By the end of 6 weeks, you should be off of pain medications.  Refills will not be given by the office during evening hours, on weekends, or after 6 weeks post-op.  To seek refills on pain medications during the initial 6 week post-operative period, you must call the office 48 hours in advance to request the refill. The office will then notify you when to  the prescription. DO NOT wait until you are out of the medication to request a refill.    V. FOLLOW-UP VISITS:  You will need to follow up in the office with Dr. Frantz Hickman/ Kitty Michel in 2 weeks. Please call this number [phone] to schedule this appointment.  You will need to follow up with your primary care physician in 4 weeks.  If you have any concerns or suspected complications prior to your follow up visit, please call your surgeons office. Do not wait until your appointment time if you suspect complications. These will need to be addressed in the office promptly.            CODE STATUS:    Code Status and Medical Interventions:   Ordered at: 11/30/21 1531     Code Status (Patient has no pulse and is not breathing):    CPR (Attempt to Resuscitate)     Medical Interventions (Patient has pulse or is  breathing):    Full Support     Condition on discharge: Improved    Future Appointments   Date Time Provider Department Center   12/15/2021 10:15 AM Kitty Michel PA [facility]   1/7/2022  8:30 AM Froilan Luque MD [facility]   4/20/2022  8:30 AM Holly Spivey MD [facility]       Additional Instructions for the Follow-ups that You Need to Schedule     Discharge Follow-up with Specialty: Orthopedics; 2 Weeks   As directed      Specialty: Orthopedics    Follow Up: 2 Weeks    Follow Up Details: Return to the office to see Dr. Frantz Hickman/ Kitty Michel               Time spent on Discharge including face to face service:  30 minutes      Beto Hollingsworth MD

## 2021-12-05 NOTE — CASE MANAGEMENT/SOCIAL WORK
Case Management Discharge Note      Final Note: [facility]      Final Discharge Disposition Code: 62 - inpatient rehab facility

## 2021-12-08 LAB
BACTERIA SPEC AEROBE CULT: NORMAL
BACTERIA SPEC AEROBE CULT: NORMAL

## 2021-12-10 ENCOUNTER — ANTICOAGULATION VISIT (OUTPATIENT)
Dept: CARDIOLOGY | Facility: CLINIC | Age: 86
End: 2021-12-10

## 2021-12-11 ENCOUNTER — TRANSCRIBE ORDERS (OUTPATIENT)
Dept: HOME HEALTH SERVICES | Facility: HOME HEALTHCARE | Age: 86
End: 2021-12-11

## 2021-12-11 ENCOUNTER — HOME HEALTH ADMISSION (OUTPATIENT)
Dept: HOME HEALTH SERVICES | Facility: HOME HEALTHCARE | Age: 86
End: 2021-12-11

## 2021-12-11 DIAGNOSIS — Z96.641 AFTERCARE FOLLOWING RIGHT HIP JOINT REPLACEMENT SURGERY: Primary | ICD-10-CM

## 2021-12-11 DIAGNOSIS — Z47.1 AFTERCARE FOLLOWING RIGHT HIP JOINT REPLACEMENT SURGERY: Primary | ICD-10-CM

## 2021-12-13 ENCOUNTER — HOME CARE VISIT (OUTPATIENT)
Dept: HOME HEALTH SERVICES | Facility: HOME HEALTHCARE | Age: 86
End: 2021-12-13

## 2021-12-13 VITALS
WEIGHT: 140 LBS | HEART RATE: 81 BPM | DIASTOLIC BLOOD PRESSURE: 58 MMHG | TEMPERATURE: 98.1 F | HEIGHT: 72 IN | BODY MASS INDEX: 18.96 KG/M2 | RESPIRATION RATE: 18 BRPM | SYSTOLIC BLOOD PRESSURE: 110 MMHG | OXYGEN SATURATION: 96 %

## 2021-12-13 PROCEDURE — G0151 HHCP-SERV OF PT,EA 15 MIN: HCPCS

## 2021-12-14 NOTE — HOME HEALTH
Mr. Junior Robles is an 86-year old patient who reported long standing history of rt hip pain with failed conservative measures. He underwent rt total hip replacement with anterior approach and was transferred to [facility] where he stayed for over a week before he was discharged home on 12/11/21. Patient lives in bilevel home with spouse who is supportive and assisting as needed. Patient stated there was covid-19 infection in [facility] and had all his therapy sessions in his room without able to walk a whole lot. Mr. Robles was independent in indoor/outdoor ambulation without assistive device prior surgery.    Functionally, he needs sba/cga for bed mobility, able to come to stand with cga and requires sba, rolling walker, and verbal/visual cues for increase stance on rt le to ambulate about 60ft indoor this visit. He would benefit from further skilled PT services for therapeutic/home exercise program to rt le, bed mobility, transfer teaching and gait training in order to regain lost functional skills. SN young also entered due to history of anticoagulant therapy.

## 2021-12-15 ENCOUNTER — HOME CARE VISIT (OUTPATIENT)
Dept: HOME HEALTH SERVICES | Facility: HOME HEALTHCARE | Age: 86
End: 2021-12-15

## 2021-12-15 ENCOUNTER — OFFICE VISIT (OUTPATIENT)
Dept: ORTHOPEDIC SURGERY | Facility: CLINIC | Age: 86
End: 2021-12-15

## 2021-12-15 VITALS
HEART RATE: 97 BPM | BODY MASS INDEX: 18.5 KG/M2 | HEIGHT: 72 IN | DIASTOLIC BLOOD PRESSURE: 44 MMHG | WEIGHT: 136.6 LBS | SYSTOLIC BLOOD PRESSURE: 113 MMHG

## 2021-12-15 DIAGNOSIS — Z96.642 STATUS POST TOTAL HIP REPLACEMENT, LEFT: Primary | ICD-10-CM

## 2021-12-15 PROCEDURE — 99024 POSTOP FOLLOW-UP VISIT: CPT | Performed by: PHYSICIAN ASSISTANT

## 2021-12-15 NOTE — PATIENT INSTRUCTIONS
Total Hip Replacement, Anterior, Care After  This sheet gives you information about how to care for yourself after your procedure. Your doctor may also give you more specific instructions. If you have problems or questions, contact your doctor.  What can I expect after the procedure?  · Pain.  · Redness and swelling.  · Stiffness.  · Discomfort.  · A small amount of blood coming from your cut from surgery (incision).  · A small amount of clear fluid coming from your incision.  Follow these instructions at home:  Medicines  · Take over-the-counter and prescription medicines only as told by your doctor.  · Take a medicine to thin your blood (anticoagulant) as told, if your doctor prescribed one.  · If told, take steps to prevent problems with pooping (constipation). You may need to:  ? Drink enough fluid to keep your pee (urine) pale yellow.  ? Take medicines. You will be told what medicines to take.  ? Eat foods that are high in fiber. These include beans, whole grains, and fresh fruits and vegetables.  ? Limit foods that are high in fat and sugar. These include fried or sweet foods.  · Ask your doctor if you should avoid driving or using machines while you are taking your medicine.  Incision care    · Follow instructions from your doctor about how to take care of your incision. Make sure you:  ? Wash your hands with soap and water for at least 20 seconds before and after you change your bandage (dressing). If you cannot use soap and water, use hand .  ? Change your bandage as told by your doctor.  ? Leave stitches (sutures), staples, skin glue, or skin tape (adhesive) strips in place. They may need to stay in place for 2 weeks or longer. If tape strips get loose and curl up, you may trim the loose edges. Do not take off tape strips completely unless your doctor tells you to do that.  · Do not take baths, swim, or use a hot tub until your doctor says it is okay.  · Check your incision every day for signs of  infection. Check for:  ? More redness, swelling, or pain.  ? More fluid or blood.  ? Warmth.  ? Pus or a bad smell.    Managing pain, stiffness, and swelling    · If told, put ice on the hip area. To do this:  ? Put ice in a plastic bag.  ? Place a towel between your skin and the bag.  ? Leave the ice on for 20 minutes, 2-3 times a day.  ? Take off the ice if your skin turns bright red. This is very important. If you cannot feel pain, heat, or cold, you have a greater risk of damage to the area.  · Move your toes often.  · Raise your leg above the level of your heart while you are lying down.    Activity  · Ask your doctor what activities are safe for you.  · Get up to take short walks every 1 to 2 hours. Ask for help if you feel weak or unsteady.  · Do exercises as told by your doctor.  · Do not use your legs to support (bear) your body weight until your doctor says that you can. Follow instructions about how much weight you may safely support on your injured leg (weight-bearing restrictions).  · Use a walker, crutches, or a cane as told by your doctor.  · Return to your normal activities when your doctor says that it is safe.  Movement restrictions    · Follow instructions from your doctor about how much you should let your hip move (movement restrictions). For example:  ? Do not cross your legs at the knees. Try keeping a pillow between your legs when you lie in bed.  ? Do not bend farther than 90 degrees at your hip and waist. To keep from bending this far:  § Do not bring your knees higher than your hips.  § Do not  something from the floor while sitting in a chair.  § Avoid sitting in low chairs.  § Use a raised toilet seat.  § When you start to stand up after sitting, keep your injured leg out in front of you.  ? Avoid twisting at your waist and reaching across your body to the side of your injured leg.  ? Avoid turning your toes inward.  · When getting into a car:  1. Raise the seat as high as you  can.  2. Move the seat as far back as it will go.  3. Recline the upper part of the seat slightly.  4. Sit down onto the seat with your injured leg sticking out of the car.  5. Scoot back in the seat as you move the lower half of your body into the car. Try to avoid bumping your foot or leg as you bring it into the car.    Safety  · Keep floors clear of things you may trip over.  · Place items that you may need within easy reach.  · Wear an apron or tool belt with pockets for carrying things.  General instructions  · Ask your doctor when it is safe to drive.  · Wear compression stockings as told by your doctor.  · Keep doing breathing exercises as told.  · Do not smoke or use any products that contain nicotine or tobacco. If you need help quitting, ask your doctor.  · If you plan to visit a dentist:  ? Tell your doctor. Ask about things to do before your teeth are cleaned.  ? Tell your dentist about your new joint.  · Keep all follow-up visits.  Contact a doctor if:  · You have a fever or chills.  · You have a cough.  · Your medicine does not help your pain.  · You have any of these signs of infection in your incision:  ? More redness, swelling, or pain.  ? More fluid or blood.  ? Warmth.  ? Pus or a bad smell.  Get help right away if:  · You have very bad pain.  · You feel short of breath.  · You have trouble breathing.  · You have chest pain.  · You have redness, swelling, pain, or warmth in your leg or in the back of your lower leg (calf).  · Your incision opens up after stitches or staples are taken out.  These symptoms may be an emergency. Get help right away. Call your local emergency services (911 in the [place]  · Do not wait to see if the symptoms will go away.  · Do not drive yourself to the hospital.  Summary  · Take a medicine to thin your blood (anticoagulant) as told, if your doctor prescribed one.  · Follow instructions from your doctor about how to take care of your cut from surgery (incision).  · Do  not take baths, swim, or use a hot tub until your doctor says it is okay.  · Use a walker, crutches, or a cane as told by your doctor.  This information is not intended to replace advice given to you by your health care provider. Make sure you discuss any questions you have with your health care provider.  Document Revised: 05/13/2021 Document Reviewed: 05/13/2021  Elsevier Patient Education © 2021 [employer]

## 2021-12-15 NOTE — PROGRESS NOTES
"    ORTHO POSTOP VISIT       Subjective:    HPI:  Junior MONI Robles is a 86 y.o. male who presents about 2 weeks out from having a right anterior total hip replacement.  He reports he is doing well with mild intermittent discomfort.    Medications:    Current Outpatient Medications:   •  amLODIPine (NORVASC) 2.5 MG tablet, Take 2.5 mg by mouth Daily. Take preop, Disp: , Rfl:   •  Cholecalciferol (VITAMIN D-1000 MAX ST) 25 MCG (1000 UT) tablet, Take 2,000 Units by mouth Daily. Last dose 11/23, Disp: , Rfl:   •  clopidogrel (PLAVIX) 75 MG tablet, TAKE 1 TABLET EVERY DAY, Disp: 90 tablet, Rfl: 3  •  docusate sodium (COLACE) 100 MG capsule, Take 200 mg by mouth Daily. dont take preop, Disp: , Rfl:   •  GUAIFENESIN PO, Take 400 mg by mouth Daily. Last dose 11/29, Disp: , Rfl:   •  pantoprazole (PROTONIX) 40 MG EC tablet, Take 40 mg by mouth Every Night., Disp: , Rfl:   •  polycarbophil 625 MG tablet tablet, Take 1,250 mg by mouth Daily. dont take preop , Disp: , Rfl:   •  rosuvastatin (CRESTOR) 20 MG tablet, Take 20 mg by mouth Every Night., Disp: , Rfl:   •  tamsulosin (FLOMAX) 0.4 MG capsule 24 hr capsule, Take 1 capsule by mouth Daily., Disp: 30 capsule, Rfl: 0  •  warfarin (COUMADIN) 5 MG tablet, Take 5 mg by mouth. Everyday EXCEPT FRIDAY, Disp: , Rfl:   •  warfarin (COUMADIN) 7.5 MG tablet, Take 7.5 mg by mouth 1 (One) Time Per Week. Friday, Disp: , Rfl:   Current outpatient and discharge medications have been reconciled for the patient.  Reviewed by: POONAM Hernandez      Allergies:  Allergies   Allergen Reactions   • Valsartan Other (See Comments)     Weakness, loss of appetite    • Atorvastatin Calcium Myalgia   • Codeine Other (See Comments)     Lightheaded           Objective   Objective:    /44 (BP Location: Left arm, Patient Position: Sitting, Cuff Size: Adult)   Pulse 97   Ht 182.9 cm (72\")   Wt 62 kg (136 lb 9.6 oz)   BMI 18.53 kg/m²     Physical Examination:  Alert, oriented, well-nourished, " well-developed individual in no acute distress, ambulating with the assistance of a walker  Right lower extremity shows a well-healing surgical incision with no erythema, drainage, or open skin lesions. There is no appreciable swelling in the thigh area. It is grossly well aligned, and the patient is neurovascularly intact distally. Plantar and dorsiflexion is 5/5. There is no tenderness to palpation or with range of motion, which is smooth.         Imaging:  xrays obtained today  right Hip X-Ray    Date of exam: 12/15/2021    Indication: 2 weeks status post right anterior total hip replacement    AP and lateral    Findings:a well positioned total hip without evidence of bony or implant failure and No fractures or dislocations appreciated.    within normal limits joint spaces    Hardware appropriately positioned yes    yes prior studies available for comparison.    X-RAY was ordered and reviewed by POONAM Hernandez            Assessment:  1. Status post total hip replacement, left       About 2 weeks out from surgery        Plan:  At this time, we will plan to see the patient back in about 6 weeks. The patient should continue PT, WBAT, and call with any problems.               POONAM Hernandez  12/15/21  15:52 EST    EMR Dragon/Transcription disclaimer:  Much of this encounter note is an electronic transcription/translation of spoken language to printed text. The electronic translation of spoken language may permit erroneous, or at times, nonsensical words or phrases to be inadvertently transcribed; Although I have reviewed the note for such errors, some may still exist.

## 2021-12-16 ENCOUNTER — HOME CARE VISIT (OUTPATIENT)
Dept: HOME HEALTH SERVICES | Facility: HOME HEALTHCARE | Age: 86
End: 2021-12-16

## 2021-12-16 PROCEDURE — G0299 HHS/HOSPICE OF RN EA 15 MIN: HCPCS

## 2021-12-16 PROCEDURE — G0151 HHCP-SERV OF PT,EA 15 MIN: HCPCS

## 2021-12-16 PROCEDURE — G0180 MD CERTIFICATION HHA PATIENT: HCPCS | Performed by: ORTHOPAEDIC SURGERY

## 2021-12-17 ENCOUNTER — HOME CARE VISIT (OUTPATIENT)
Dept: HOME HEALTH SERVICES | Facility: HOME HEALTHCARE | Age: 86
End: 2021-12-17

## 2021-12-17 VITALS
TEMPERATURE: 97.9 F | SYSTOLIC BLOOD PRESSURE: 126 MMHG | OXYGEN SATURATION: 97 % | RESPIRATION RATE: 18 BRPM | DIASTOLIC BLOOD PRESSURE: 54 MMHG | HEART RATE: 78 BPM

## 2021-12-17 VITALS
DIASTOLIC BLOOD PRESSURE: 58 MMHG | HEART RATE: 84 BPM | TEMPERATURE: 98.7 F | SYSTOLIC BLOOD PRESSURE: 116 MMHG | OXYGEN SATURATION: 99 %

## 2021-12-17 NOTE — HOME HEALTH
Patient is complying with illustrated home exercise program between PT visits and showing good progress toward meeting bed mobility, transfer and ambulation goals. No disturbing pain in rt hip per patient

## 2021-12-17 NOTE — HOME HEALTH
Pt home post hospital and rehab after RTHR.  Pt taking warfarin 7.5mg on Fri and 5 all other days.  He is unsure what last INR was but thiks it was 1.8 in rehab last.  Call placed to Dr Barreto office (Emily)  and message left for orders to obtain PT INR when next due.  Pt is homebound at present and does not think he could get out for INRs.  Awaiting return call.      Pt staying on 2nd story in home being remodeled.  Cluttered upstairs due to downstair remodel.   States he has everything up here he needs and not walking down the stairs at present.  Pts main complant is neck and shoulder pain from either lying wrong or using it differently since surgery.   New on norco for this.  ALso started gabapentin and iron tab.  Instructed on all these effect Se.  Pt states no BM today and will start his stool softener since 2 of these can cause constipation.  Instructed on food high in fiber and drinking at least 6-8 8oz cups of water daily.  Pt states he will try.   Dressing to R hip taken off by MD yesterday and now open to air, well approximated without signs of infection.  No gutierrez in place but pt states he is now incont where he was not before surgery and hope this corrects itself over time.  Instructed on frequent position changes. [place]   10.5cm right hip incision.  SKin assessed.  coccyx area a little sore.  Pink but blanchable.  Instructed to keep clean and dry and on frequent position changes to prevent skin breakdown,  Pt able to teachback.    Plan for next visit: assess incision to right hip.  assess pain and pain control.  See when INR due. obtain if needed,  assess for SE of new med, such as constipation, assess skin.

## 2021-12-20 ENCOUNTER — HOME CARE VISIT (OUTPATIENT)
Dept: HOME HEALTH SERVICES | Facility: HOME HEALTHCARE | Age: 86
End: 2021-12-20

## 2021-12-20 VITALS
SYSTOLIC BLOOD PRESSURE: 128 MMHG | TEMPERATURE: 97.9 F | DIASTOLIC BLOOD PRESSURE: 68 MMHG | RESPIRATION RATE: 15 BRPM | HEART RATE: 82 BPM | OXYGEN SATURATION: 15 %

## 2021-12-20 PROCEDURE — G0157 HHC PT ASSISTANT EA 15: HCPCS

## 2021-12-21 ENCOUNTER — ANTICOAGULATION VISIT (OUTPATIENT)
Dept: CARDIOLOGY | Facility: CLINIC | Age: 86
End: 2021-12-21

## 2021-12-21 ENCOUNTER — HOME CARE VISIT (OUTPATIENT)
Dept: HOME HEALTH SERVICES | Facility: HOME HEALTHCARE | Age: 86
End: 2021-12-21

## 2021-12-21 ENCOUNTER — LAB REQUISITION (OUTPATIENT)
Dept: LAB | Facility: HOSPITAL | Age: 86
End: 2021-12-21

## 2021-12-21 VITALS
DIASTOLIC BLOOD PRESSURE: 60 MMHG | RESPIRATION RATE: 19 BRPM | TEMPERATURE: 98.3 F | HEART RATE: 66 BPM | SYSTOLIC BLOOD PRESSURE: 144 MMHG

## 2021-12-21 DIAGNOSIS — I48.0 PAROXYSMAL ATRIAL FIBRILLATION (HCC): Primary | ICD-10-CM

## 2021-12-21 DIAGNOSIS — Z47.1 AFTERCARE FOLLOWING JOINT REPLACEMENT SURGERY: ICD-10-CM

## 2021-12-21 LAB
INR PPP: 10.1
INR PPP: >=8 (ref 2–3)
PROTHROMBIN TIME: >90 SECONDS (ref 19.4–28.5)

## 2021-12-21 PROCEDURE — G0299 HHS/HOSPICE OF RN EA 15 MIN: HCPCS

## 2021-12-21 PROCEDURE — 93793 ANTICOAG MGMT PT WARFARIN: CPT | Performed by: INTERNAL MEDICINE

## 2021-12-21 PROCEDURE — 85610 PROTHROMBIN TIME: CPT | Performed by: INTERNAL MEDICINE

## 2021-12-21 NOTE — HOME HEALTH
medication education  fall prevention education  pt inr with results called to Emily for Dr. Melo because he is vacation

## 2021-12-22 NOTE — PATIENT INSTRUCTIONS
Pt S/P hip surgery, then rehab at [facility]. First HH visit since that discharge. Per Dr. Harry, patient advised by HH nurse, Shania Koch to present to ER but patient refused. Instructed Shania to have pt hold warfarin today & tomorrow, eat bowl of spinach/kale the next 2 days & recheck INR on Thursday.

## 2021-12-23 ENCOUNTER — ANTICOAGULATION VISIT (OUTPATIENT)
Dept: CARDIOLOGY | Facility: CLINIC | Age: 86
End: 2021-12-23

## 2021-12-23 ENCOUNTER — HOME CARE VISIT (OUTPATIENT)
Dept: HOME HEALTH SERVICES | Facility: HOME HEALTHCARE | Age: 86
End: 2021-12-23

## 2021-12-23 ENCOUNTER — LAB REQUISITION (OUTPATIENT)
Dept: LAB | Facility: HOSPITAL | Age: 86
End: 2021-12-23

## 2021-12-23 VITALS
SYSTOLIC BLOOD PRESSURE: 126 MMHG | RESPIRATION RATE: 18 BRPM | TEMPERATURE: 98.3 F | HEART RATE: 61 BPM | DIASTOLIC BLOOD PRESSURE: 60 MMHG

## 2021-12-23 DIAGNOSIS — Z47.1 AFTERCARE FOLLOWING JOINT REPLACEMENT SURGERY: ICD-10-CM

## 2021-12-23 DIAGNOSIS — I48.0 PAROXYSMAL ATRIAL FIBRILLATION (HCC): Primary | ICD-10-CM

## 2021-12-23 LAB
INR PPP: 3.5
INR PPP: 3.51 (ref 2–3)
PROTHROMBIN TIME: 35.5 SECONDS (ref 19.4–28.5)

## 2021-12-23 PROCEDURE — G0299 HHS/HOSPICE OF RN EA 15 MIN: HCPCS

## 2021-12-23 PROCEDURE — G0157 HHC PT ASSISTANT EA 15: HCPCS

## 2021-12-23 PROCEDURE — 85610 PROTHROMBIN TIME: CPT | Performed by: INTERNAL MEDICINE

## 2021-12-23 PROCEDURE — 93793 ANTICOAG MGMT PT WARFARIN: CPT | Performed by: INTERNAL MEDICINE

## 2021-12-23 NOTE — HOME HEALTH
pt inr with results called to [facility]  medication education on coumadin   fall prevention education  pain assessment

## 2021-12-24 VITALS
HEART RATE: 88 BPM | OXYGEN SATURATION: 97 % | SYSTOLIC BLOOD PRESSURE: 126 MMHG | DIASTOLIC BLOOD PRESSURE: 80 MMHG | TEMPERATURE: 97.8 F

## 2021-12-27 ENCOUNTER — HOME CARE VISIT (OUTPATIENT)
Dept: HOME HEALTH SERVICES | Facility: HOME HEALTHCARE | Age: 86
End: 2021-12-27

## 2021-12-27 VITALS
TEMPERATURE: 97.8 F | DIASTOLIC BLOOD PRESSURE: 68 MMHG | HEART RATE: 72 BPM | OXYGEN SATURATION: 96 % | SYSTOLIC BLOOD PRESSURE: 128 MMHG | RESPIRATION RATE: 15 BRPM

## 2021-12-27 PROCEDURE — G0157 HHC PT ASSISTANT EA 15: HCPCS

## 2021-12-28 ENCOUNTER — ANTICOAGULATION VISIT (OUTPATIENT)
Dept: CARDIOLOGY | Facility: CLINIC | Age: 86
End: 2021-12-28

## 2021-12-28 ENCOUNTER — HOME CARE VISIT (OUTPATIENT)
Dept: HOME HEALTH SERVICES | Facility: HOME HEALTHCARE | Age: 86
End: 2021-12-28

## 2021-12-28 VITALS
DIASTOLIC BLOOD PRESSURE: 56 MMHG | SYSTOLIC BLOOD PRESSURE: 140 MMHG | TEMPERATURE: 98.4 F | RESPIRATION RATE: 18 BRPM | HEART RATE: 72 BPM

## 2021-12-28 DIAGNOSIS — I48.0 PAROXYSMAL ATRIAL FIBRILLATION (HCC): Primary | ICD-10-CM

## 2021-12-28 LAB
HH POC INTERNATIONAL NORMALIZATION RATIO: 1.9
HH POC PROTIME: 22.6 SECONDS
INR PPP: 1.9

## 2021-12-28 PROCEDURE — 93793 ANTICOAG MGMT PT WARFARIN: CPT | Performed by: INTERNAL MEDICINE

## 2021-12-28 PROCEDURE — G0299 HHS/HOSPICE OF RN EA 15 MIN: HCPCS

## 2021-12-28 NOTE — HOME HEALTH
pt up and prepared for PT session.   pt reporting he has been performing PT hep as able but with weakness and continued limitations.    pt reporting no pain on arrival but admits he continues to have cervical and R hip soreness at times and is attempting to wean from all pain meds.       pt was guarded of the RLE, improved, but with contiued weakness and functional limitations.   pt was challenged today to wb through the RLE at times and remains reliant on his walker.  educated pt on proper posture and lumbar stab techniques when standing and during gait trg and pt tends to be forward flexed with cervical flexion.  with cues this was improved.   educated pt on standing cervical rom to improve both  balance and cervical rom.

## 2021-12-29 NOTE — HOME HEALTH
pt inr with results called to [facility]  medication education  fall prevention education   pain assessment

## 2021-12-30 ENCOUNTER — HOME CARE VISIT (OUTPATIENT)
Dept: HOME HEALTH SERVICES | Facility: HOME HEALTHCARE | Age: 86
End: 2021-12-30

## 2021-12-30 VITALS
DIASTOLIC BLOOD PRESSURE: 50 MMHG | SYSTOLIC BLOOD PRESSURE: 108 MMHG | OXYGEN SATURATION: 99 % | HEART RATE: 84 BPM | TEMPERATURE: 98 F

## 2021-12-30 PROCEDURE — G0151 HHCP-SERV OF PT,EA 15 MIN: HCPCS

## 2021-12-30 NOTE — HOME HEALTH
Denies medication changes or adverse reactions. No insurance change. No falls reported. Pt requesting more visits to practice ability to use cane for steps.   Plan for next visit: gait training with cane and steps, transfer training, strengthening.     Must be completed and at least every 30 days.    Clinical condition of patient at initial or last assessment:  he needs sba/cga for bed mobility, able to come to stand with cga and requires sba, rolling walker, and verbal/visual cues for increase stance on rt le to ambulate about 60ft indoor this visit.     Current clinical condition of patient:  Pt requires I with bed mobility, supervision with transfers, SBA with ambulation with use of cane x 50 ft on level surface  min assist with step management with rail and cane.     Overall progress towards measurable treatment goals:  Pt making progress towards goal.    Effectiveness of current plan:  Pt with improving mobility.    Plan for continuing or discontinuing service:  Added 2 more PT visits    Changes to goals or care plan update to be completed in guideline section and communicated to MD:  Added 2 more PT visits.    Statement of expectation of continued progress toward goals:  Pt with good prognosis of meeting goals.     Why is it necessary for therapy to continue?  Skilled PT required to restore safe mobility.

## 2022-01-06 ENCOUNTER — TELEPHONE (OUTPATIENT)
Dept: CARDIOLOGY | Facility: CLINIC | Age: 87
End: 2022-01-06

## 2022-01-06 ENCOUNTER — HOME CARE VISIT (OUTPATIENT)
Dept: HOME HEALTH SERVICES | Facility: HOME HEALTHCARE | Age: 87
End: 2022-01-06

## 2022-01-06 ENCOUNTER — ANTICOAGULATION VISIT (OUTPATIENT)
Dept: CARDIOLOGY | Facility: CLINIC | Age: 87
End: 2022-01-06

## 2022-01-06 VITALS
DIASTOLIC BLOOD PRESSURE: 78 MMHG | OXYGEN SATURATION: 95 % | HEART RATE: 78 BPM | TEMPERATURE: 97 F | SYSTOLIC BLOOD PRESSURE: 133 MMHG | RESPIRATION RATE: 18 BRPM

## 2022-01-06 DIAGNOSIS — I48.0 PAROXYSMAL ATRIAL FIBRILLATION: Primary | ICD-10-CM

## 2022-01-06 LAB
HH POC INTERNATIONAL NORMALIZATION RATIO: 3.7
HH POC PROTIME: 41.7 SECONDS
INR PPP: 3.7

## 2022-01-06 PROCEDURE — 93793 ANTICOAG MGMT PT WARFARIN: CPT | Performed by: INTERNAL MEDICINE

## 2022-01-06 PROCEDURE — G0157 HHC PT ASSISTANT EA 15: HCPCS

## 2022-01-06 PROCEDURE — G0299 HHS/HOSPICE OF RN EA 15 MIN: HCPCS

## 2022-01-06 NOTE — HOME HEALTH
no changes in pt. status, pt. due for PT/INR today, no upcoming MD appt's, labs done and called to Dr. Harding. Pt. to hold today, and SN to receive call back tomorrow on what to do when nurse gets in Dr. Harding's office.  Pt. denies falls, states no pain at this time.         Next SN visit: CP assess, pain assess, falls assess, PT/INR follow up

## 2022-01-06 NOTE — TELEPHONE ENCOUNTER
Received call from [facility] nurse. She states the patients INR is 3.7 and PT is 41.7. She said he takes 5mg 6 days a week and 7.5mg 1 day a week.  Spoke to Emily JESSICA CMA and she states for patient to hold warfarin today only and she will call with further orders tomorrow.    Called Geri SPAULDING back at [phone] and gave directions and she verbalized understanding and states she will notify the patient.

## 2022-01-07 VITALS
OXYGEN SATURATION: 92 % | TEMPERATURE: 97.7 F | HEART RATE: 71 BPM | DIASTOLIC BLOOD PRESSURE: 78 MMHG | SYSTOLIC BLOOD PRESSURE: 118 MMHG

## 2022-01-12 ENCOUNTER — HOME CARE VISIT (OUTPATIENT)
Dept: HOME HEALTH SERVICES | Facility: HOME HEALTHCARE | Age: 87
End: 2022-01-12

## 2022-01-12 VITALS
RESPIRATION RATE: 18 BRPM | SYSTOLIC BLOOD PRESSURE: 160 MMHG | HEART RATE: 86 BPM | OXYGEN SATURATION: 99 % | DIASTOLIC BLOOD PRESSURE: 60 MMHG | TEMPERATURE: 97.5 F

## 2022-01-12 PROCEDURE — G0151 HHCP-SERV OF PT,EA 15 MIN: HCPCS

## 2022-01-12 NOTE — HOME HEALTH
"PT Discharge    Pt up ambulating with standard cane upon arrival , pt reported has been utilizing cane for the past few days and has gone out the other day to drive using the cane outdoors. Pt reportedly cleared to drive during MD appt as long as not taking pain MEDs anymore. Pt denies falls, ER use, MED and insurance changes since last cliniician visit and denies any concerns or issues at this time.  Pt well aware of anticipated d/c and remains in agreement reporting \" I'm doing well and getting around it just needs more time for my endurance\"."

## 2022-01-13 ENCOUNTER — ANTICOAGULATION VISIT (OUTPATIENT)
Dept: CARDIOLOGY | Facility: CLINIC | Age: 87
End: 2022-01-13

## 2022-01-13 ENCOUNTER — HOME CARE VISIT (OUTPATIENT)
Dept: HOME HEALTH SERVICES | Facility: HOME HEALTHCARE | Age: 87
End: 2022-01-13

## 2022-01-13 VITALS
DIASTOLIC BLOOD PRESSURE: 79 MMHG | SYSTOLIC BLOOD PRESSURE: 128 MMHG | BODY MASS INDEX: 18.96 KG/M2 | WEIGHT: 140 LBS | HEART RATE: 76 BPM | HEIGHT: 72 IN | OXYGEN SATURATION: 99 % | RESPIRATION RATE: 18 BRPM

## 2022-01-13 DIAGNOSIS — I48.0 PAROXYSMAL ATRIAL FIBRILLATION: Primary | ICD-10-CM

## 2022-01-13 LAB
HH POC INTERNATIONAL NORMALIZATION RATIO: 2.8
HH POC PROTIME: 32 SECONDS
INR PPP: 2.8

## 2022-01-13 PROCEDURE — 93793 ANTICOAG MGMT PT WARFARIN: CPT | Performed by: INTERNAL MEDICINE

## 2022-01-13 PROCEDURE — G0299 HHS/HOSPICE OF RN EA 15 MIN: HCPCS

## 2022-01-13 NOTE — HOME HEALTH
pt. doing well, PT/INR done today WNL, pt. aware to call and schedule cardiology appt. within 4 weeks.  Physical therapy has discharged patient, pt. states he is out and driving and needs no assistance with ADL's.      Pt. aware and in agreeance with discharge today.

## 2022-01-21 ENCOUNTER — ANTICOAGULATION VISIT (OUTPATIENT)
Dept: CARDIOLOGY | Facility: CLINIC | Age: 87
End: 2022-01-21

## 2022-01-21 DIAGNOSIS — I48.0 PAROXYSMAL ATRIAL FIBRILLATION: Primary | ICD-10-CM

## 2022-01-21 LAB — INR PPP: 1.9 (ref 0.9–1.1)

## 2022-01-21 PROCEDURE — 85610 PROTHROMBIN TIME: CPT | Performed by: INTERNAL MEDICINE

## 2022-01-21 PROCEDURE — 93793 ANTICOAG MGMT PT WARFARIN: CPT | Performed by: INTERNAL MEDICINE

## 2022-01-21 PROCEDURE — 36416 COLLJ CAPILLARY BLOOD SPEC: CPT | Performed by: INTERNAL MEDICINE

## 2022-01-26 ENCOUNTER — OFFICE VISIT (OUTPATIENT)
Dept: ORTHOPEDIC SURGERY | Facility: CLINIC | Age: 87
End: 2022-01-26

## 2022-01-26 VITALS
HEART RATE: 96 BPM | HEIGHT: 72 IN | DIASTOLIC BLOOD PRESSURE: 54 MMHG | WEIGHT: 133.2 LBS | SYSTOLIC BLOOD PRESSURE: 107 MMHG | BODY MASS INDEX: 18.04 KG/M2

## 2022-01-26 DIAGNOSIS — Z96.642 STATUS POST TOTAL HIP REPLACEMENT, LEFT: Primary | ICD-10-CM

## 2022-01-26 PROBLEM — H26.493 BILATERAL POSTERIOR CAPSULAR OPACIFICATION: Status: ACTIVE | Noted: 2020-07-15

## 2022-01-26 PROBLEM — I47.1 PAROXYSMAL SUPRAVENTRICULAR TACHYCARDIA: Status: ACTIVE | Noted: 2018-01-15

## 2022-01-26 PROBLEM — I47.10 PAROXYSMAL SUPRAVENTRICULAR TACHYCARDIA: Status: ACTIVE | Noted: 2018-01-15

## 2022-01-26 PROBLEM — Z96.1 BILATERAL PSEUDOPHAKIA: Status: ACTIVE | Noted: 2020-07-15

## 2022-01-26 PROCEDURE — 99024 POSTOP FOLLOW-UP VISIT: CPT | Performed by: PHYSICIAN ASSISTANT

## 2022-01-26 NOTE — PROGRESS NOTES
"    ORTHO POSTOP VISIT       Subjective:    HPI:  Junior MONI Robles is a 86 y.o. male who presents about a weeks out from having a right anterior total hip replacement.  He reports that he is doing well with mild intermittent discomfort and some numbness in the thigh.  He is pleased with his results thus far.    Medications:    Current Outpatient Medications:   •  amLODIPine (NORVASC) 2.5 MG tablet, Take 2.5 mg by mouth Daily. Take preop, Disp: , Rfl:   •  Cholecalciferol (VITAMIN D-1000 MAX ST) 25 MCG (1000 UT) tablet, Take 2,000 Units by mouth Daily. Last dose 11/23, Disp: , Rfl:   •  clopidogrel (PLAVIX) 75 MG tablet, TAKE 1 TABLET EVERY DAY, Disp: 90 tablet, Rfl: 3  •  docusate sodium (COLACE) 100 MG capsule, Take 200 mg by mouth Daily. dont take preop, Disp: , Rfl:   •  ferrous sulfate 325 (65 FE) MG tablet, Take 325 mg by mouth Daily With Breakfast., Disp: , Rfl:   •  GUAIFENESIN PO, Take 400 mg by mouth Daily. Last dose 11/29, Disp: , Rfl:   •  pantoprazole (PROTONIX) 40 MG EC tablet, Take 40 mg by mouth Every Night., Disp: , Rfl:   •  polycarbophil 625 MG tablet tablet, Take 1,250 mg by mouth Daily. Fiber tablet, Disp: , Rfl:   •  rosuvastatin (CRESTOR) 20 MG tablet, Take 20 mg by mouth Every Night., Disp: , Rfl:   •  warfarin (COUMADIN) 5 MG tablet, Take 5 mg by mouth. Everyday EXCEPT FRIDAY, Disp: , Rfl:   •  warfarin (COUMADIN) 7.5 MG tablet, Take 7.5 mg by mouth 1 (One) Time Per Week. Friday, Disp: , Rfl:   Current outpatient and discharge medications have been reconciled for the patient.  Reviewed by: POONAM Hernandez      Allergies:  Allergies   Allergen Reactions   • Valsartan Other (See Comments)     Weakness, loss of appetite    • Atorvastatin Calcium Myalgia   • Codeine Other (See Comments)     Lightheaded           Objective   Objective:    /54 (BP Location: Left arm, Patient Position: Sitting, Cuff Size: Adult)   Pulse 96   Ht 182.9 cm (72\")   Wt 60.4 kg (133 lb 3.2 oz)   BMI 18.07 " "kg/m²     Physical Examination:  Alert, oriented, well-nourished, well-developed individual in no acute distress, ambulating with the assistance of a cane  Right lower extremity shows a well-healed surgical incision with no erythema, drainage, or open skin lesions. There is no appreciable swelling in the thigh area. It is grossly well aligned, and the patient is neurovascularly intact distally. Plantar and dorsiflexion is 5/5. There is no tenderness to palpation or with range of motion, which is smooth and WNL.         Imaging:  no diagnostic testing performed this visit            Assessment:  1. Status post total hip replacement, left       About 8 weeks out from surgery        Plan:  We will plan to see him back in about 10 months with repeat imaging.  GI, , and dental antibiotic prophylaxis discussed.  He should call with any questions or concerns.             POONAM Hernandez  01/26/22  16:09 EST    \"Please note that portions of this note were completed with a voice recognition program\".                   "

## 2022-01-26 NOTE — PATIENT INSTRUCTIONS
High-Protein and High-Calorie Diet  Eating high-protein and high-calorie foods can help you to gain weight, heal after an injury, and recover after an illness or surgery. The specific amount of daily protein and calories you need depends on:  · Your body weight.  · The reason this diet is recommended for you.  What is my plan?  Generally, a high-protein, high-calorie diet involves:  · Eating 250-500 extra calories each day.  · Making sure that you get enough of your daily calories from protein. Ask your health care provider how many of your calories should come from protein.  Talk with a health care provider, such as a diet and nutrition specialist (dietitian), about how much protein and how many calories you need each day. Follow the diet as directed by your health care provider.  What are tips for following this plan?    Preparing meals  · Add whole milk, half-and-half, or heavy cream to cereal, pudding, soup, or hot cocoa.  · Add whole milk to instant breakfast drinks.  · Add peanut butter to oatmeal or smoothies.  · Add powdered milk to baked goods, smoothies, or milkshakes.  · Add powdered milk, cream, or butter to mashed potatoes.  · Add cheese to cooked vegetables.  · Make whole-milk yogurt parfaits. Top them with granola, fruit, or nuts.  · Add cottage cheese to your fruit.  · Add avocado, cheese, or both to sandwiches or salads.  · Add meat, poultry, or seafood to rice, pasta, casseroles, salads, and soups.  · Use mayonnaise when making egg salad, chicken salad, or tuna salad.  · Use peanut butter as a dip for vegetables or as a topping for pretzels, celery, or crackers.  · Add beans to casseroles, dips, and spreads.  · Add pureed beans to sauces and soups.  · Replace calorie-free drinks with calorie-containing drinks, such as milk and fruit juice.  · Replace water with milk or heavy cream when making foods such as oatmeal, pudding, or cocoa.  General instructions  · Ask your health care provider if you  should take a nutritional supplement.  · Try to eat six small meals each day instead of three large meals.  · Eat a balanced diet. In each meal, include one food that is high in protein.  · Keep nutritious snacks available, such as nuts, trail mixes, dried fruit, and yogurt.  · If you have kidney disease or diabetes, talk with your health care provider about how much protein is safe for you. Too much protein may put extra stress on your kidneys.  · Drink your calories. Choose high-calorie drinks and have them after your meals.  What high-protein foods should I eat?    Vegetables  Soybeans. Peas.  Grains  Quinoa. Bulgur wheat.  Meats and other proteins  Beef, pork, and poultry. Fish and seafood. Eggs. Tofu. Textured vegetable protein (TVP). Peanut butter. Nuts and seeds. Dried beans. Protein powders.  Dairy  Whole milk. Whole-milk yogurt. Powdered milk. Cheese. Cottage Cheese. Eggnog.  Beverages  High-protein supplement drinks. Soy milk.  Other foods  Protein bars.  The items listed above may not be a complete list of high-protein foods and beverages. Contact a dietitian for more options.  What high-calorie foods should I eat?  Fruits  Dried fruit. Fruit leather. Canned fruit in syrup. Fruit juice. Avocado.  Vegetables  Vegetables cooked in oil or butter. Fried potatoes.  Grains  Pasta. Quick breads. Muffins. Pancakes. Ready-to-eat cereal.  Meats and other proteins  Peanut butter. Nuts and seeds.  Dairy  Heavy cream. Whipped cream. Cream cheese. Sour cream. Ice cream. Custard. Pudding.  Beverages  Meal-replacement beverages. Nutrition shakes. Fruit juice. Sugar-sweetened soft drinks.  Seasonings and condiments  Salad dressing. Mayonnaise. Cb sauce. Fruit preserves or jelly. Honey. Syrup.  Sweets and desserts  Cake. Cookies. Pie. Pastries. Candy bars. Chocolate.  Fats and oils  Butter or margarine. Oil. Gravy.  Other foods  Meal-replacement bars.  The items listed above may not be a complete list of high-calorie  foods and beverages. Contact a dietitian for more options.  Summary  · A high-protein, high-calorie diet can help you gain weight or heal faster after an injury, illness, or surgery.  · To increase your protein and calories, add ingredients such as whole milk, peanut butter, cheese, beans, meat, or seafood to meal items.  · To get enough extra calories each day, include high-calorie foods and beverages at each meal.  · Adding a high-calorie drink or shake can be an easy way to help you get enough calories each day. Talk with your healthcare provider or dietitian about the best options for you.  This information is not intended to replace advice given to you by your health care provider. Make sure you discuss any questions you have with your health care provider.  Document Revised: 11/30/2018 Document Reviewed: 10/30/2018  Elsevier Patient Education © 2021 [employer]

## 2022-02-11 ENCOUNTER — ANTICOAGULATION VISIT (OUTPATIENT)
Dept: CARDIOLOGY | Facility: CLINIC | Age: 87
End: 2022-02-11

## 2022-02-11 DIAGNOSIS — I48.0 PAROXYSMAL ATRIAL FIBRILLATION: Primary | ICD-10-CM

## 2022-02-11 LAB — INR PPP: 2.2 (ref 0.9–1.1)

## 2022-02-11 PROCEDURE — 36416 COLLJ CAPILLARY BLOOD SPEC: CPT | Performed by: INTERNAL MEDICINE

## 2022-02-11 PROCEDURE — 93793 ANTICOAG MGMT PT WARFARIN: CPT | Performed by: INTERNAL MEDICINE

## 2022-02-11 PROCEDURE — 85610 PROTHROMBIN TIME: CPT | Performed by: INTERNAL MEDICINE

## 2022-03-03 PROCEDURE — 93294 REM INTERROG EVL PM/LDLS PM: CPT | Performed by: INTERNAL MEDICINE

## 2022-03-03 PROCEDURE — 93296 REM INTERROG EVL PM/IDS: CPT | Performed by: INTERNAL MEDICINE

## 2022-03-11 ENCOUNTER — ANTICOAGULATION VISIT (OUTPATIENT)
Dept: CARDIOLOGY | Facility: CLINIC | Age: 87
End: 2022-03-11

## 2022-03-11 DIAGNOSIS — I48.0 PAROXYSMAL ATRIAL FIBRILLATION: Primary | ICD-10-CM

## 2022-03-11 LAB — INR PPP: 2.4 (ref 0.9–1.1)

## 2022-03-11 PROCEDURE — 36416 COLLJ CAPILLARY BLOOD SPEC: CPT | Performed by: INTERNAL MEDICINE

## 2022-03-11 PROCEDURE — 93793 ANTICOAG MGMT PT WARFARIN: CPT | Performed by: INTERNAL MEDICINE

## 2022-03-11 PROCEDURE — 85610 PROTHROMBIN TIME: CPT | Performed by: INTERNAL MEDICINE

## 2022-03-31 ENCOUNTER — OFFICE VISIT (OUTPATIENT)
Dept: CARDIOLOGY | Facility: CLINIC | Age: 87
End: 2022-03-31

## 2022-03-31 VITALS
DIASTOLIC BLOOD PRESSURE: 69 MMHG | SYSTOLIC BLOOD PRESSURE: 152 MMHG | OXYGEN SATURATION: 96 % | HEIGHT: 72 IN | HEART RATE: 91 BPM | BODY MASS INDEX: 18.28 KG/M2 | WEIGHT: 135 LBS

## 2022-03-31 DIAGNOSIS — I25.10 CORONARY ARTERY DISEASE INVOLVING NATIVE CORONARY ARTERY OF NATIVE HEART WITHOUT ANGINA PECTORIS: ICD-10-CM

## 2022-03-31 DIAGNOSIS — I10 ESSENTIAL HYPERTENSION: ICD-10-CM

## 2022-03-31 DIAGNOSIS — I49.5 SICK SINUS SYNDROME: ICD-10-CM

## 2022-03-31 DIAGNOSIS — Z95.0 PRESENCE OF CARDIAC PACEMAKER: ICD-10-CM

## 2022-03-31 DIAGNOSIS — I48.0 PAROXYSMAL ATRIAL FIBRILLATION: Primary | ICD-10-CM

## 2022-03-31 PROCEDURE — 93000 ELECTROCARDIOGRAM COMPLETE: CPT | Performed by: INTERNAL MEDICINE

## 2022-03-31 PROCEDURE — 99213 OFFICE O/P EST LOW 20 MIN: CPT | Performed by: INTERNAL MEDICINE

## 2022-03-31 RX ORDER — AMOXICILLIN 500 MG/1
1000 CAPSULE ORAL 2 TIMES DAILY
COMMUNITY
End: 2022-12-30

## 2022-03-31 NOTE — PROGRESS NOTES
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 Junior Schuler  is a very pleasant 86 years old anxious gentleman with history of chronic ischemic heart disease status post previous CABG and subsequent PCI stenting.  He has had  history of atrial fibrillation .  He is on long-term anticoagulation therapy as well.  He is currently taking warfarin to prevent cardioembolic episodes.   patient continues to have rapid intermittent atrial arrhythmias associated with fatigue and tiredness and patient is intolerant to sotalol and started on Amiodarone which is unable to tolerate--Patient underwent AF ablation  months ago and comes in for follow-up and  is feeling much better without any significant symptoms--Patient has noticed diffuse subcutaneous bleeding with ongoing intake of warfarin    patient had prior AV roc reentrant tachycardia ablation   chads Vasc score--4  HASBLED--3  Patient is doing remarkably well and denies any chest pain or dyspnea or syncope  No syncope or palpitations since AF ablation  Remains in functional class I for his age  No angina       Past Medical History:     Reviewed history from 01/26/2018 and no changes required:        Coronary artery disease: S/P CABG and PCI         Hyperlipidemia        Myocardial infarction, anterior        Hypertension        Gastroesophageal reflux disease        Dubose's esophagus        Diverticulitis        Atrial Fibrillation: new onset 12-4-15        SSS, Toby, Tachy : Pacemaker implantation / [employer]         Atrial Fibrillation, paroxysmal         pneumonia        AVNRT ablation--1/2018    Past Surgical History:     Reviewed history from 04/19/2018 and no changes required:        CABG x 4, 8-15-01        PCI/stent, LCX,  2-25-07        Hernia repair        Left rotator cuff repair        9  inches of colon removed        Colonoscopy        Cardiac Cath: 2001:2003;  2007: 2010:         [facility] 12-15-15 / unsuccessful attempt PCI of obtuse marginal branch of circumflex. Represents failed 2nd attempt of PCI of this vessel.         Herniorrhaphy Left Side        [facility] : 8-5-16- [employer] Pacemaker Implantation        SVT ablation 1/2018 [facility]        Left ATHR 4/4/18 Dr. Smith        Review of Systems   General: No fatigue or tiredness, No change in weight   Eyes: No redness  Cardiovascular: No chest pain, no palpitations  Respiratory: No shortness of breath          Physical Exam    General:      well developed, well nourished, in no acute distress.    Head:      normocephalic and atraumatic.    Eyes:      PERRL/EOM intact, conjunctiva and sclera clear with out nystagmus.    Neck:      no masses, thyromegaly   trachea central with normal respiratory effort and PMI is nondisplaced  Lungs:      clear bilaterally to auscultation.    Heart:       regular rate and rhythm, S1, S2 without murmurs, rubs, or gallops      ssessment plan    recurrent and  paroxysmal atrial fibrillation -- post ablation doing well---2 percent A. 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-- post ablation with complete resolution of symptoms   dual-chamber pacemaker in situ with normal function on home monitoring   coronary artery disease--stable without angina     Hypertension--- well-controlled  Medications reviewed and follow-up after 6 months and appropriate pacemaker function noted        ECG 12 Lead    Date/Time: 3/31/2022 5:25 PM  Performed by: Froilan Luque MD  Authorized by: Froilan Luque MD   Comparison: compared with previous ECG   Comparison to previous ECG: Current EKG shows atrial flutter compared to previous EKG and incomplete right bundle branch block  with a normal axis with 1 PVC            Electronically signed by Froilan Luque MD, 03/31/22, 5:25 PM EDT.

## 2022-04-08 ENCOUNTER — ANTICOAGULATION VISIT (OUTPATIENT)
Dept: CARDIOLOGY | Facility: CLINIC | Age: 87
End: 2022-04-08

## 2022-04-08 DIAGNOSIS — I48.0 PAROXYSMAL ATRIAL FIBRILLATION: Primary | ICD-10-CM

## 2022-04-08 LAB — INR PPP: 2.2 (ref 0.9–1.1)

## 2022-04-08 PROCEDURE — 36416 COLLJ CAPILLARY BLOOD SPEC: CPT | Performed by: INTERNAL MEDICINE

## 2022-04-08 PROCEDURE — 93793 ANTICOAG MGMT PT WARFARIN: CPT | Performed by: INTERNAL MEDICINE

## 2022-04-08 PROCEDURE — 85610 PROTHROMBIN TIME: CPT | Performed by: INTERNAL MEDICINE

## 2022-04-11 RX ORDER — WARFARIN SODIUM 5 MG/1
TABLET ORAL
Qty: 90 TABLET | Refills: 1 | Status: SHIPPED | OUTPATIENT
Start: 2022-04-11 | End: 2022-06-17

## 2022-04-20 ENCOUNTER — OFFICE VISIT (OUTPATIENT)
Dept: CARDIOLOGY | Facility: CLINIC | Age: 87
End: 2022-04-20

## 2022-04-20 VITALS
WEIGHT: 136 LBS | SYSTOLIC BLOOD PRESSURE: 119 MMHG | HEART RATE: 64 BPM | BODY MASS INDEX: 18.42 KG/M2 | DIASTOLIC BLOOD PRESSURE: 71 MMHG | OXYGEN SATURATION: 99 % | HEIGHT: 72 IN

## 2022-04-20 DIAGNOSIS — Z95.0 PRESENCE OF CARDIAC PACEMAKER: ICD-10-CM

## 2022-04-20 DIAGNOSIS — I21.9 MYOCARDIAL INFARCTION, UNSPECIFIED MI TYPE, UNSPECIFIED ARTERY: ICD-10-CM

## 2022-04-20 DIAGNOSIS — I49.5 SICK SINUS SYNDROME: Primary | ICD-10-CM

## 2022-04-20 DIAGNOSIS — E78.2 MIXED HYPERLIPIDEMIA: ICD-10-CM

## 2022-04-20 DIAGNOSIS — I47.1 PAROXYSMAL SUPRAVENTRICULAR TACHYCARDIA: ICD-10-CM

## 2022-04-20 DIAGNOSIS — I25.10 CORONARY ARTERY DISEASE INVOLVING NATIVE CORONARY ARTERY OF NATIVE HEART WITHOUT ANGINA PECTORIS: ICD-10-CM

## 2022-04-20 DIAGNOSIS — I10 BENIGN ESSENTIAL HYPERTENSION: ICD-10-CM

## 2022-04-20 DIAGNOSIS — I25.5 ISCHEMIC CARDIOMYOPATHY: ICD-10-CM

## 2022-04-20 DIAGNOSIS — Z79.01 LONG TERM CURRENT USE OF ANTICOAGULANT: ICD-10-CM

## 2022-04-20 DIAGNOSIS — I48.92 ATRIAL FLUTTER, UNSPECIFIED TYPE: ICD-10-CM

## 2022-04-20 DIAGNOSIS — I48.0 PAROXYSMAL ATRIAL FIBRILLATION: ICD-10-CM

## 2022-04-20 PROCEDURE — 99214 OFFICE O/P EST MOD 30 MIN: CPT | Performed by: INTERNAL MEDICINE

## 2022-04-20 RX ORDER — CLOPIDOGREL BISULFATE 75 MG/1
75 TABLET ORAL DAILY
Qty: 90 TABLET | Refills: 3 | Status: SHIPPED | OUTPATIENT
Start: 2022-04-20 | End: 2023-02-03

## 2022-04-20 NOTE — PROGRESS NOTES
Cardiology Office Visit      Encounter Date:  04/20/2022    Patient ID:   Junior MONI Robles is a 86 y.o. male.        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 As you are well aware, this is a 86 y.o. male with history of chronic ischemic heart disease status post previous CABG and subsequent PCI stenting.  He has had  history of atrial fibrillation  and  is on long-term anticoagulation   therapy with warfarin.       Patient underwent AF ablation and  has been feeling much better without any significant symptoms-he does have easy bruisability and takes clopidogrel and warfarin.   Patient had prior AV roc reentrant tachycardia ablation.  He is intolerant to numerous medications including beta-blocker therapy.  He is currently on warfarin to prevent cardioembolic episodes. chads Vasc score--4.  He is status post permanent pacemaker implant       Interval History:  Patient denies any new complaints denies any symptoms of chest pain  dizziness or syncope  Denies any new complaints except for some shortness of breath with exertion and activity  Dizziness or syncope  Compliant with medical therapy    Interpretation Summary    · Abnormal myocardial perfusion study  · Large severe intensity myocardial perfusion defect involving the anterior wall anteroseptal wall and anteroapical wall suggestive of prior myocardial infarction in this territory with small brigette-infarct ischemia  · Impressions are consistent with an intermediate risk study.  · Left ventricular ejection fraction is normal. (Calculated EF = 50%).  · Clinical correlation is recommended    Interpretation Summary    · The left ventricular  cavity is mildly dilated.  · Left ventricular wall thickness is consistent with mild concentric hypertrophy.  · Estimated left ventricular EF = 45% Left ventricular systolic function is mildly decreased.  · The following left ventricular wall segments are hypokinetic: mid anterior, apical anterior, apical septal, mid inferoseptal, apex hypokinetic and mid anteroseptal.  · Estimated right ventricular systolic pressure from tricuspid regurgitation is mildly elevated (35-45 mmHg).  · Mild pulmonary hypertension is present.  · Left ventricular diastolic function is consistent with (grade II w/high LAP) pseudonormalization.        Assessment & Plan    Impressions:  1- CAD,   Status post CABG.  Status post PCI stenting.  No anginal symptoms.      2- recurrent and  paroxysmal atrial fibrillation -- post ablation doing well very rare palpitations short-lived.    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-- post ablation with complete resolution of symptoms     4- dual-chamber pacemaker in situ with normal function      5-  hypertension  Under excellent control  Exertional shortness of breath and dyspnea  Ischemic cardiomyopathy with LV ejection fraction of 45%  Abnormal stress test with prior myocardial infarction with no significant reversible ischemia    Recommendations:  Continue current medical therapy  Continued aggressive risk factor modification  Patient is currently being considered for possible hip surgery for arthritis  Patient is tolerating anticoagulation therapy without any GI bleeding issues  Patient denies any exertional chest pain does not see any reason to proceed with any further invasive work-up after discussion the results of the stress test and echocardiogram  Continue current conservative medical management  Patient is currently on statin anticoagulation therapy with Coumadin and Plavix  Follow-up in office in 6 months  Denies any new cardiac symptoms  Medication  "refills  Continue current medical therapy with Coumadin patient is tolerating without any further problems and INR is therapeutic  Continue current medical therapy with Crestor 20 mg p.o. once a day Plavix 75 mg p.o. once a day amlodipine 2.5 mg p.o. once a day  Continued aggressive risk factor modification  Need for routine labs to be done with primary care physician in the next few months reviewed and discussed the patient  Thanks very much for allowing us to participate in the care of your patients    Objective:    Vitals:  Vitals:    04/20/22 0815   BP: 119/71   Pulse: 64   SpO2: 99%   Weight: 61.7 kg (136 lb)   Height: 182.9 cm (72\")     Body mass index is 18.44 kg/m².      Physical Exam:    General: Alert, cooperative, no distress, appears stated age  Head:  Normocephalic, atraumatic, mucous membranes moist  Eyes:  Conjunctiva/corneas clear, EOM's intact     Neck:  Supple,  no bruit    Lungs: Clear to auscultation bilaterally, no wheezes rhonchi rales are noted  Chest wall: No tenderness  Heart::  irregular rate and rhythm, S1 and S2 normal, displaced LV apical impulse  Abdomen: Soft, non-tender, nondistended bowel sounds active  Extremities: No cyanosis, clubbing, or edema  Pulses: 2+ and symmetric all extremities  Skin:  No rashes or lesions  Neuro/psych: A&O x3. CN II through XII are grossly intact with appropriate affect      Allergies:  Allergies   Allergen Reactions   • Valsartan Other (See Comments)     Weakness, loss of appetite    • Atorvastatin Calcium Myalgia   • Codeine Other (See Comments)     Lightheaded        Medication Review:     Current Outpatient Medications:   •  amLODIPine (NORVASC) 2.5 MG tablet, Take 2.5 mg by mouth Daily. Take preop, Disp: , Rfl:   •  amoxicillin (AMOXIL) 500 MG capsule, Take 1,000 mg by mouth 2 (Two) Times a Day., Disp: , Rfl:   •  cholecalciferol (Cholecalciferol) 25 MCG (1000 UT) tablet, Take 2,000 Units by mouth Daily. Last dose 11/23, Disp: , Rfl:   •  clopidogrel " (PLAVIX) 75 MG tablet, TAKE 1 TABLET EVERY DAY, Disp: 90 tablet, Rfl: 3  •  docusate sodium (COLACE) 100 MG capsule, Take 200 mg by mouth Daily. dont take preop, Disp: , Rfl:   •  ferrous sulfate 325 (65 FE) MG tablet, Take 325 mg by mouth Daily With Breakfast., Disp: , Rfl:   •  GUAIFENESIN PO, Take 400 mg by mouth Daily. Last dose 11/29, Disp: , Rfl:   •  pantoprazole (PROTONIX) 40 MG EC tablet, Take 40 mg by mouth Every Night., Disp: , Rfl:   •  polycarbophil 625 MG tablet tablet, Take 1,250 mg by mouth Daily. Fiber tablet, Disp: , Rfl:   •  rosuvastatin (CRESTOR) 20 MG tablet, Take 20 mg by mouth Every Night., Disp: , Rfl:   •  warfarin (COUMADIN) 5 MG tablet, TAKE 1 TABLET EVERY NIGHT EXCEPT TUESDAY AND FRIDAY OR AS DIRECTED, Disp: 90 tablet, Rfl: 1  •  warfarin (COUMADIN) 7.5 MG tablet, Take 7.5 mg by mouth 1 (One) Time Per Week. Friday, Disp: , Rfl:     Family History:  Family History   Problem Relation Age of Onset   • Stomach cancer Mother    • Heart disease Father         leaky valve       Past Medical History:  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    AVNRT Ablation   • Dubose esophagus    • CAD (coronary artery disease)     S/P CABG and PCI   • Diverticulitis    • Gastroesophageal reflux disease    • Hyperlipidemia    • Hypertension    • Myocardial infarction (HCC)     anterior   • Paroxysmal atrial fibrillation (HCC)     havent been in afib   • Pneumonia    • SSS (sick sinus syndrome) (HCC)     SSS, Toby, tachy; pacemaker implantation/ [employer]       Past Surgical History:  Past Surgical History:   Procedure Laterality Date   • CARDIAC CATHETERIZATION      2001:2003; 2007:2010:[facility] 12-15-15/ unsuccessful attempt PCI of obtuse marginal branch of circumflex. represents failed 2nd attempt of PCI of this vessel.   • COLON SURGERY      9 inches on colon removed   • COLONOSCOPY     •  CORONARY ANGIOPLASTY     • CORONARY ARTERY BYPASS GRAFT  08/15/2001    x4   • CORONARY STENT PLACEMENT  02/25/2007    LCX    • HERNIA REPAIR Right    • HERNIA REPAIR Left    • INSERT / REPLACE / REMOVE PACEMAKER  08/05/2016    [facility] [employer] pacemaker implantation   • PROSTATE SURGERY  03/31/2019    REZUM: prostate   • ROTATOR CUFF REPAIR Left    • TOTAL HIP ARTHROPLASTY Left 04/04/2018    [facility] Dr. Smith   • TOTAL HIP ARTHROPLASTY Right 11/30/2021    Procedure: TOTAL HIP ARTHROPLASTY ANTERIOR WITH HANA TABLE;  Surgeon: Frantz Hickman MD;  [facility]: [facility] MAIN OR;  Service: Orthopedics;  Laterality: Right;   • VENTRICULAR ABLATION SURGERY  01/2018    SVT ablation [facility]       Social History:  Social History     Socioeconomic History   • Marital status:    Tobacco Use   • Smoking status: Current Some Day Smoker     Types: Cigars   • Smokeless tobacco: Never Used   • Tobacco comment: 2  cigars  per day   Vaping Use   • Vaping Use: Never used   Substance and Sexual Activity   • Alcohol use: No   • Drug use: No   • Sexual activity: Defer       Review of Systems:  The following systems were reviewed as they relate to the cardiovascular system: Constitutional, Eyes, ENT, Cardiovascular, Respiratory, Gastrointestinal, Integumentary, Neurological, Psychiatric, Hematologic, Endocrine, Musculoskeletal, and Genitourinary. The pertinent cardiovascular findings are reported above with all other cardiovascular points within those systems being negative.    Diagnostic Study Review:     Current Electrocardiogram:  Procedures    Laboratory Data:  Lab Results   Component Value Date    GLUCOSE 116 (H) 12/02/2021    BUN 22 12/02/2021    CREATININE 1.28 (H) 12/02/2021    EGFRIFNONA 53 (L) 12/02/2021    EGFRIFAFRI >60 03/31/2017    BCR 17.2 12/02/2021    K 4.3 12/02/2021    CO2 24.0 12/02/2021    CALCIUM 7.5 (L) 12/02/2021    ALBUMIN 3.6 07/31/2018    LABIL2 1.4 07/31/2018    AST 20 07/31/2018    ALT 18 07/31/2018     Lab  Results   Component Value Date    GLUCOSE 116 (H) 12/02/2021    CALCIUM 7.5 (L) 12/02/2021     (L) 12/02/2021    K 4.3 12/02/2021    CO2 24.0 12/02/2021     12/02/2021    BUN 22 12/02/2021    CREATININE 1.28 (H) 12/02/2021    EGFRIFAFRI >60 03/31/2017    EGFRIFNONA 53 (L) 12/02/2021    BCR 17.2 12/02/2021    ANIONGAP 8.0 12/02/2021     Lab Results   Component Value Date    WBC 12.80 (H) 12/03/2021    HGB 7.9 (L) 12/04/2021    HCT 23.4 (L) 12/04/2021    MCV 96.5 12/03/2021     12/03/2021     No results found for: CHOL, CHLPL, TRIG, HDL, LDL, LDLDIRECT  Lab Results   Component Value Date    HGBA1C 5.5 11/30/2021     Lab Results   Component Value Date    INR 2.20 (A) 04/08/2022    INR 2.40 (A) 03/11/2022    INR 2.20 (A) 02/11/2022    PROTIME 35.5 (H) 12/23/2021    PROTIME >90.0 (H) 12/21/2021    PROTIME 24.4 12/10/2021       Most Recent Echo:  Results for orders placed during the hospital encounter of 05/26/21    Adult Transthoracic Echo Complete W/ Cont if Necessary Per Protocol    Interpretation Summary  · The left ventricular cavity is mildly dilated.  · Left ventricular wall thickness is consistent with mild concentric hypertrophy.  · Estimated left ventricular EF = 45% Left ventricular systolic function is mildly decreased.  · The following left ventricular wall segments are hypokinetic: mid anterior, apical anterior, apical septal, mid inferoseptal, apex hypokinetic and mid anteroseptal.  · Estimated right ventricular systolic pressure from tricuspid regurgitation is mildly elevated (35-45 mmHg).  · Mild pulmonary hypertension is present.  · Left ventricular diastolic function is consistent with (grade II w/high LAP) pseudonormalization.       Most Recent Stress Test:  Results for orders placed during the hospital encounter of 05/26/21    Stress Test With Myocardial Perfusion One Day    Interpretation Summary  · Abnormal myocardial perfusion study  · Large severe intensity myocardial perfusion  defect involving the anterior wall anteroseptal wall and anteroapical wall suggestive of prior myocardial infarction in this territory with small brigette-infarct ischemia  · Impressions are consistent with an intermediate risk study.  · Left ventricular ejection fraction is normal. (Calculated EF = 50%).  · Clinical correlation is recommended       Most Recent Cardiac Catheterization:   No results found for this or any previous visit.       NOTE: The following portions of the patient's note were reviewed, confirmed and/or updated this visit as appropriate: History of present illness/Interval history, physical examination, assessment & plan, allergies, current medications, past family history, past medical history, past social history, past surgical history and problem list.

## 2022-05-06 ENCOUNTER — ANTICOAGULATION VISIT (OUTPATIENT)
Dept: CARDIOLOGY | Facility: CLINIC | Age: 87
End: 2022-05-06

## 2022-05-06 DIAGNOSIS — I48.0 PAROXYSMAL ATRIAL FIBRILLATION: Primary | ICD-10-CM

## 2022-05-06 LAB — INR PPP: 2.2 (ref 0.9–1.1)

## 2022-05-06 PROCEDURE — 85610 PROTHROMBIN TIME: CPT | Performed by: INTERNAL MEDICINE

## 2022-05-06 PROCEDURE — 36416 COLLJ CAPILLARY BLOOD SPEC: CPT | Performed by: INTERNAL MEDICINE

## 2022-05-06 PROCEDURE — 93793 ANTICOAG MGMT PT WARFARIN: CPT | Performed by: INTERNAL MEDICINE

## 2022-06-02 PROCEDURE — 93294 REM INTERROG EVL PM/LDLS PM: CPT | Performed by: INTERNAL MEDICINE

## 2022-06-02 PROCEDURE — 93296 REM INTERROG EVL PM/IDS: CPT | Performed by: INTERNAL MEDICINE

## 2022-06-10 ENCOUNTER — ANTICOAGULATION VISIT (OUTPATIENT)
Dept: CARDIOLOGY | Facility: CLINIC | Age: 87
End: 2022-06-10

## 2022-06-10 DIAGNOSIS — I48.0 PAROXYSMAL ATRIAL FIBRILLATION: Primary | ICD-10-CM

## 2022-06-10 LAB — INR PPP: 1.9 (ref 0.9–1.1)

## 2022-06-10 PROCEDURE — 36416 COLLJ CAPILLARY BLOOD SPEC: CPT | Performed by: INTERNAL MEDICINE

## 2022-06-10 PROCEDURE — 85610 PROTHROMBIN TIME: CPT | Performed by: INTERNAL MEDICINE

## 2022-06-10 PROCEDURE — 93793 ANTICOAG MGMT PT WARFARIN: CPT | Performed by: INTERNAL MEDICINE

## 2022-06-17 RX ORDER — WARFARIN SODIUM 5 MG/1
TABLET ORAL
Qty: 90 TABLET | Refills: 0 | Status: SHIPPED | OUTPATIENT
Start: 2022-06-17 | End: 2023-01-04

## 2022-07-01 ENCOUNTER — ANTICOAGULATION VISIT (OUTPATIENT)
Dept: CARDIOLOGY | Facility: CLINIC | Age: 87
End: 2022-07-01

## 2022-07-01 DIAGNOSIS — I48.0 PAROXYSMAL ATRIAL FIBRILLATION: Primary | ICD-10-CM

## 2022-07-01 LAB — INR PPP: 1.6 (ref 0.9–1.1)

## 2022-07-01 PROCEDURE — 93793 ANTICOAG MGMT PT WARFARIN: CPT | Performed by: INTERNAL MEDICINE

## 2022-07-01 PROCEDURE — 36416 COLLJ CAPILLARY BLOOD SPEC: CPT | Performed by: INTERNAL MEDICINE

## 2022-07-01 PROCEDURE — 85610 PROTHROMBIN TIME: CPT | Performed by: INTERNAL MEDICINE

## 2022-07-22 ENCOUNTER — ANTICOAGULATION VISIT (OUTPATIENT)
Dept: CARDIOLOGY | Facility: CLINIC | Age: 87
End: 2022-07-22

## 2022-07-22 DIAGNOSIS — I48.0 PAROXYSMAL ATRIAL FIBRILLATION: Primary | ICD-10-CM

## 2022-07-22 LAB — INR PPP: 2 (ref 0.9–1.1)

## 2022-07-22 PROCEDURE — 93793 ANTICOAG MGMT PT WARFARIN: CPT | Performed by: INTERNAL MEDICINE

## 2022-07-22 PROCEDURE — 85610 PROTHROMBIN TIME: CPT | Performed by: INTERNAL MEDICINE

## 2022-07-22 PROCEDURE — 36416 COLLJ CAPILLARY BLOOD SPEC: CPT | Performed by: INTERNAL MEDICINE

## 2022-08-19 ENCOUNTER — ANTICOAGULATION VISIT (OUTPATIENT)
Dept: CARDIOLOGY | Facility: CLINIC | Age: 87
End: 2022-08-19

## 2022-08-19 DIAGNOSIS — I48.0 PAROXYSMAL ATRIAL FIBRILLATION: Primary | ICD-10-CM

## 2022-08-19 LAB — INR PPP: 1.9 (ref 0.9–1.1)

## 2022-08-19 PROCEDURE — 36416 COLLJ CAPILLARY BLOOD SPEC: CPT | Performed by: INTERNAL MEDICINE

## 2022-08-19 PROCEDURE — 93793 ANTICOAG MGMT PT WARFARIN: CPT | Performed by: INTERNAL MEDICINE

## 2022-08-19 PROCEDURE — 85610 PROTHROMBIN TIME: CPT | Performed by: INTERNAL MEDICINE

## 2022-09-01 PROCEDURE — 93296 REM INTERROG EVL PM/IDS: CPT | Performed by: INTERNAL MEDICINE

## 2022-09-01 PROCEDURE — 93294 REM INTERROG EVL PM/LDLS PM: CPT | Performed by: INTERNAL MEDICINE

## 2022-09-09 ENCOUNTER — ANTICOAGULATION VISIT (OUTPATIENT)
Dept: CARDIOLOGY | Facility: CLINIC | Age: 87
End: 2022-09-09

## 2022-09-09 DIAGNOSIS — I48.0 PAROXYSMAL ATRIAL FIBRILLATION: Primary | ICD-10-CM

## 2022-09-09 LAB — INR PPP: 1.9 (ref 0.9–1.1)

## 2022-09-09 PROCEDURE — 93793 ANTICOAG MGMT PT WARFARIN: CPT | Performed by: INTERNAL MEDICINE

## 2022-09-09 PROCEDURE — 85610 PROTHROMBIN TIME: CPT | Performed by: INTERNAL MEDICINE

## 2022-09-09 PROCEDURE — 36416 COLLJ CAPILLARY BLOOD SPEC: CPT | Performed by: INTERNAL MEDICINE

## 2022-09-30 ENCOUNTER — ANTICOAGULATION VISIT (OUTPATIENT)
Dept: CARDIOLOGY | Facility: CLINIC | Age: 87
End: 2022-09-30

## 2022-09-30 DIAGNOSIS — I48.0 PAROXYSMAL ATRIAL FIBRILLATION: Primary | ICD-10-CM

## 2022-09-30 LAB — INR PPP: 1.4 (ref 0.9–1.1)

## 2022-09-30 PROCEDURE — 85610 PROTHROMBIN TIME: CPT | Performed by: INTERNAL MEDICINE

## 2022-09-30 PROCEDURE — 93793 ANTICOAG MGMT PT WARFARIN: CPT | Performed by: INTERNAL MEDICINE

## 2022-09-30 PROCEDURE — 36416 COLLJ CAPILLARY BLOOD SPEC: CPT | Performed by: INTERNAL MEDICINE

## 2022-10-14 ENCOUNTER — ANTICOAGULATION VISIT (OUTPATIENT)
Dept: CARDIOLOGY | Facility: CLINIC | Age: 87
End: 2022-10-14

## 2022-10-14 DIAGNOSIS — I48.0 PAROXYSMAL ATRIAL FIBRILLATION: Primary | ICD-10-CM

## 2022-10-14 LAB — INR PPP: 3.8 (ref 0.9–1.1)

## 2022-10-14 PROCEDURE — 36416 COLLJ CAPILLARY BLOOD SPEC: CPT | Performed by: INTERNAL MEDICINE

## 2022-10-14 PROCEDURE — 85610 PROTHROMBIN TIME: CPT | Performed by: INTERNAL MEDICINE

## 2022-10-14 PROCEDURE — 93793 ANTICOAG MGMT PT WARFARIN: CPT | Performed by: INTERNAL MEDICINE

## 2022-11-04 ENCOUNTER — ANTICOAGULATION VISIT (OUTPATIENT)
Dept: CARDIOLOGY | Facility: CLINIC | Age: 87
End: 2022-11-04

## 2022-11-04 DIAGNOSIS — I48.0 PAROXYSMAL ATRIAL FIBRILLATION: Primary | ICD-10-CM

## 2022-11-04 LAB — INR PPP: 3.7 (ref 0.9–1.1)

## 2022-11-04 PROCEDURE — 93793 ANTICOAG MGMT PT WARFARIN: CPT | Performed by: INTERNAL MEDICINE

## 2022-11-04 PROCEDURE — 85610 PROTHROMBIN TIME: CPT | Performed by: INTERNAL MEDICINE

## 2022-11-04 PROCEDURE — 36416 COLLJ CAPILLARY BLOOD SPEC: CPT | Performed by: INTERNAL MEDICINE

## 2022-11-22 ENCOUNTER — ANTICOAGULATION VISIT (OUTPATIENT)
Dept: CARDIOLOGY | Facility: CLINIC | Age: 87
End: 2022-11-22

## 2022-11-22 DIAGNOSIS — I48.0 PAROXYSMAL ATRIAL FIBRILLATION: Primary | ICD-10-CM

## 2022-11-22 LAB — INR PPP: 1.7 (ref 0.9–1.1)

## 2022-11-22 PROCEDURE — 93793 ANTICOAG MGMT PT WARFARIN: CPT | Performed by: INTERNAL MEDICINE

## 2022-11-22 PROCEDURE — 36416 COLLJ CAPILLARY BLOOD SPEC: CPT | Performed by: INTERNAL MEDICINE

## 2022-11-22 PROCEDURE — 85610 PROTHROMBIN TIME: CPT | Performed by: INTERNAL MEDICINE

## 2022-12-13 ENCOUNTER — ANTICOAGULATION VISIT (OUTPATIENT)
Dept: CARDIOLOGY | Facility: CLINIC | Age: 87
End: 2022-12-13

## 2022-12-13 DIAGNOSIS — I48.0 PAROXYSMAL ATRIAL FIBRILLATION: Primary | ICD-10-CM

## 2022-12-13 LAB — INR PPP: 2.9 (ref 0.9–1.1)

## 2022-12-13 PROCEDURE — 36416 COLLJ CAPILLARY BLOOD SPEC: CPT | Performed by: INTERNAL MEDICINE

## 2022-12-13 PROCEDURE — 93793 ANTICOAG MGMT PT WARFARIN: CPT | Performed by: INTERNAL MEDICINE

## 2022-12-13 PROCEDURE — 85610 PROTHROMBIN TIME: CPT | Performed by: INTERNAL MEDICINE

## 2022-12-30 ENCOUNTER — OFFICE VISIT (OUTPATIENT)
Dept: CARDIOLOGY | Facility: CLINIC | Age: 87
End: 2022-12-30

## 2022-12-30 VITALS
WEIGHT: 141 LBS | HEART RATE: 75 BPM | SYSTOLIC BLOOD PRESSURE: 145 MMHG | BODY MASS INDEX: 19.1 KG/M2 | HEIGHT: 72 IN | DIASTOLIC BLOOD PRESSURE: 77 MMHG | OXYGEN SATURATION: 98 %

## 2022-12-30 DIAGNOSIS — E78.2 MIXED HYPERLIPIDEMIA: ICD-10-CM

## 2022-12-30 DIAGNOSIS — I49.5 SICK SINUS SYNDROME: ICD-10-CM

## 2022-12-30 DIAGNOSIS — Z95.0 PRESENCE OF CARDIAC PACEMAKER: ICD-10-CM

## 2022-12-30 DIAGNOSIS — I25.10 CORONARY ARTERY DISEASE INVOLVING NATIVE CORONARY ARTERY OF NATIVE HEART WITHOUT ANGINA PECTORIS: ICD-10-CM

## 2022-12-30 DIAGNOSIS — I48.0 PAROXYSMAL ATRIAL FIBRILLATION: Primary | ICD-10-CM

## 2022-12-30 PROCEDURE — 99214 OFFICE O/P EST MOD 30 MIN: CPT | Performed by: INTERNAL MEDICINE

## 2022-12-30 PROCEDURE — 93000 ELECTROCARDIOGRAM COMPLETE: CPT | Performed by: INTERNAL MEDICINE

## 2022-12-30 PROCEDURE — 93280 PM DEVICE PROGR EVAL DUAL: CPT | Performed by: INTERNAL MEDICINE

## 2022-12-30 NOTE — PROGRESS NOTES
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  Patient has chronic ischemic heart disease status post previous bypass surgery and subsequent PCI and stenting.  He also has history of paroxysmal atrial fibrillation.  Patient does have history of hyperlipidemia and hypertension in the past.  Patient has sick sinus syndrome with a dual-chamber pacemaker in situ.  Patient had cryo AF ablation 2018.       Past Medical History:     Reviewed history from 01/26/2018 and no changes required:        Coronary artery disease: S/P CABG and PCI         Hyperlipidemia        Myocardial infarction, anterior        Hypertension        Gastroesophageal reflux disease        Dubose's esophagus        Diverticulitis        Atrial Fibrillation: new onset 12-4-15        SSS, Toby, Tachy : Pacemaker implantation / [employer]         Atrial Fibrillation, paroxysmal         pneumonia        AVNRT ablation--1/2018    Past Surgical History:     Reviewed history from 04/19/2018 and no changes required:        CABG x 4, 8-15-01        PCI/stent, LCX,  2-25-07        Hernia repair        Left rotator cuff repair        9 inches of colon removed        Colonoscopy        Cardiac Cath: 2001:2003;  2007: 2010:         [facility] 12-15-15 / unsuccessful attempt PCI of obtuse marginal branch of circumflex. Represents failed 2nd attempt of PCI of this vessel.         Herniorrhaphy Left Side        [facility] : 8-5-16- [employer] Pacemaker Implantation        SVT ablation 1/2018 [facility]        Left ATHR 4/4/18 Dr. Smith      Physical Exam    General:      well developed, well nourished, in no acute distress.    Head:      normocephalic and atraumatic.    Eyes:      PERRL/EOM  intact, conjunctivae and sclerae clear without nystagmus.    Neck:      no  thyromegaly, trachea central with normal respiratory effort  Lungs:      clear bilaterally to auscultation.    Heart:       regular rate and rhythm, S1, S2 without murmurs, rubs, or gallops  Skin:      intact without lesions or rashes.    Psych:      alert and cooperative; normal mood and affect; normal attention span and concentration.          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

## 2023-01-04 RX ORDER — WARFARIN SODIUM 5 MG/1
TABLET ORAL
Qty: 90 TABLET | Refills: 0 | Status: SHIPPED | OUTPATIENT
Start: 2023-01-04 | End: 2023-03-16

## 2023-01-10 ENCOUNTER — ANTICOAGULATION VISIT (OUTPATIENT)
Dept: CARDIOLOGY | Facility: CLINIC | Age: 88
End: 2023-01-10
Payer: MEDICARE

## 2023-01-10 DIAGNOSIS — I48.0 PAROXYSMAL ATRIAL FIBRILLATION: Primary | ICD-10-CM

## 2023-01-10 LAB — INR PPP: 1.3 (ref 0.9–1.1)

## 2023-01-10 PROCEDURE — 36416 COLLJ CAPILLARY BLOOD SPEC: CPT | Performed by: INTERNAL MEDICINE

## 2023-01-10 PROCEDURE — 85610 PROTHROMBIN TIME: CPT | Performed by: INTERNAL MEDICINE

## 2023-01-10 PROCEDURE — 93793 ANTICOAG MGMT PT WARFARIN: CPT | Performed by: INTERNAL MEDICINE

## 2023-01-24 ENCOUNTER — ANTICOAGULATION VISIT (OUTPATIENT)
Dept: CARDIOLOGY | Facility: CLINIC | Age: 88
End: 2023-01-24
Payer: MEDICARE

## 2023-01-24 DIAGNOSIS — I48.0 PAROXYSMAL ATRIAL FIBRILLATION: Primary | ICD-10-CM

## 2023-01-24 LAB — INR PPP: 1.9 (ref 0.9–1.1)

## 2023-01-24 PROCEDURE — 93793 ANTICOAG MGMT PT WARFARIN: CPT | Performed by: INTERNAL MEDICINE

## 2023-01-24 PROCEDURE — 36416 COLLJ CAPILLARY BLOOD SPEC: CPT | Performed by: INTERNAL MEDICINE

## 2023-01-24 PROCEDURE — 85610 PROTHROMBIN TIME: CPT | Performed by: INTERNAL MEDICINE

## 2023-01-25 ENCOUNTER — OFFICE VISIT (OUTPATIENT)
Dept: ORTHOPEDIC SURGERY | Facility: CLINIC | Age: 88
End: 2023-01-25
Payer: MEDICARE

## 2023-01-25 VITALS — HEIGHT: 72 IN | BODY MASS INDEX: 18.93 KG/M2 | WEIGHT: 139.8 LBS

## 2023-01-25 DIAGNOSIS — Z96.642 STATUS POST TOTAL HIP REPLACEMENT, LEFT: Primary | ICD-10-CM

## 2023-01-25 DIAGNOSIS — Z96.641 STATUS POST TOTAL HIP REPLACEMENT, RIGHT: ICD-10-CM

## 2023-01-25 PROCEDURE — 99213 OFFICE O/P EST LOW 20 MIN: CPT | Performed by: ORTHOPAEDIC SURGERY

## 2023-02-03 RX ORDER — CLOPIDOGREL BISULFATE 75 MG/1
TABLET ORAL
Qty: 90 TABLET | Refills: 3 | Status: SHIPPED | OUTPATIENT
Start: 2023-02-03

## 2023-02-06 PROBLEM — Z96.641 STATUS POST TOTAL HIP REPLACEMENT, RIGHT: Status: ACTIVE | Noted: 2023-02-06

## 2023-02-21 ENCOUNTER — ANTICOAGULATION VISIT (OUTPATIENT)
Dept: CARDIOLOGY | Facility: CLINIC | Age: 88
End: 2023-02-21
Payer: MEDICARE

## 2023-02-21 DIAGNOSIS — I48.0 PAROXYSMAL ATRIAL FIBRILLATION: Primary | ICD-10-CM

## 2023-02-21 LAB — INR PPP: 3.3 (ref 0.9–1.1)

## 2023-02-21 PROCEDURE — 85610 PROTHROMBIN TIME: CPT | Performed by: INTERNAL MEDICINE

## 2023-02-21 PROCEDURE — 36416 COLLJ CAPILLARY BLOOD SPEC: CPT | Performed by: INTERNAL MEDICINE

## 2023-02-21 PROCEDURE — 93793 ANTICOAG MGMT PT WARFARIN: CPT | Performed by: INTERNAL MEDICINE

## 2023-03-14 ENCOUNTER — ANTICOAGULATION VISIT (OUTPATIENT)
Dept: CARDIOLOGY | Facility: CLINIC | Age: 88
End: 2023-03-14
Payer: MEDICARE

## 2023-03-14 DIAGNOSIS — I48.0 PAROXYSMAL ATRIAL FIBRILLATION: Primary | ICD-10-CM

## 2023-03-14 LAB — INR PPP: 3.1 (ref 0.9–1.1)

## 2023-03-14 PROCEDURE — 36416 COLLJ CAPILLARY BLOOD SPEC: CPT | Performed by: INTERNAL MEDICINE

## 2023-03-14 PROCEDURE — 85610 PROTHROMBIN TIME: CPT | Performed by: INTERNAL MEDICINE

## 2023-03-14 PROCEDURE — 93793 ANTICOAG MGMT PT WARFARIN: CPT | Performed by: INTERNAL MEDICINE

## 2023-03-16 RX ORDER — WARFARIN SODIUM 5 MG/1
TABLET ORAL
Qty: 90 TABLET | Refills: 0 | Status: SHIPPED | OUTPATIENT
Start: 2023-03-16

## 2023-03-30 PROCEDURE — 93294 REM INTERROG EVL PM/LDLS PM: CPT | Performed by: INTERNAL MEDICINE

## 2023-03-30 PROCEDURE — 93296 REM INTERROG EVL PM/IDS: CPT | Performed by: INTERNAL MEDICINE

## 2023-04-11 ENCOUNTER — ANTICOAGULATION VISIT (OUTPATIENT)
Dept: CARDIOLOGY | Facility: CLINIC | Age: 88
End: 2023-04-11
Payer: MEDICARE

## 2023-04-11 DIAGNOSIS — I48.0 PAROXYSMAL ATRIAL FIBRILLATION: Primary | ICD-10-CM

## 2023-04-11 LAB — INR PPP: 2.5 (ref 0.9–1.1)

## 2023-04-11 PROCEDURE — 93793 ANTICOAG MGMT PT WARFARIN: CPT | Performed by: INTERNAL MEDICINE

## 2023-04-11 PROCEDURE — 36416 COLLJ CAPILLARY BLOOD SPEC: CPT | Performed by: INTERNAL MEDICINE

## 2023-04-11 PROCEDURE — 85610 PROTHROMBIN TIME: CPT | Performed by: INTERNAL MEDICINE

## 2023-05-05 ENCOUNTER — TELEPHONE (OUTPATIENT)
Dept: CARDIOLOGY | Facility: CLINIC | Age: 88
End: 2023-05-05
Payer: MEDICARE

## 2023-05-05 NOTE — TELEPHONE ENCOUNTER
Called patient-spoke with wife. I told her I thought the amoxicillin would not affect the INR. He is already scheduled to see me Tuesday so I instructed her to have him continue warfarin at his current schedule.

## 2023-05-05 NOTE — TELEPHONE ENCOUNTER
Caller: Junior MONI Robles    Relationship: Self    Best call back number: [phone]        PATIENT WOULD LIKE TO LET LANDON KNOW THAT HE HAS STARTED TAKING AMOXICILLIN 5.4.23

## 2023-05-09 ENCOUNTER — ANTICOAGULATION VISIT (OUTPATIENT)
Dept: CARDIOLOGY | Facility: CLINIC | Age: 88
End: 2023-05-09
Payer: MEDICARE

## 2023-05-09 DIAGNOSIS — I48.0 PAROXYSMAL ATRIAL FIBRILLATION: Primary | ICD-10-CM

## 2023-05-09 LAB — INR PPP: 4.2 (ref 0.9–1.1)

## 2023-05-09 PROCEDURE — 36416 COLLJ CAPILLARY BLOOD SPEC: CPT | Performed by: INTERNAL MEDICINE

## 2023-05-09 PROCEDURE — 85610 PROTHROMBIN TIME: CPT | Performed by: INTERNAL MEDICINE

## 2023-05-09 PROCEDURE — 93793 ANTICOAG MGMT PT WARFARIN: CPT | Performed by: INTERNAL MEDICINE

## 2023-05-19 ENCOUNTER — ANTICOAGULATION VISIT (OUTPATIENT)
Dept: CARDIOLOGY | Facility: CLINIC | Age: 88
End: 2023-05-19
Payer: MEDICARE

## 2023-05-19 DIAGNOSIS — I48.0 PAROXYSMAL ATRIAL FIBRILLATION: Primary | ICD-10-CM

## 2023-05-19 LAB — INR PPP: 2.1 (ref 0.9–1.1)

## 2023-05-19 PROCEDURE — 36416 COLLJ CAPILLARY BLOOD SPEC: CPT | Performed by: INTERNAL MEDICINE

## 2023-05-19 PROCEDURE — 93793 ANTICOAG MGMT PT WARFARIN: CPT | Performed by: INTERNAL MEDICINE

## 2023-05-19 PROCEDURE — 85610 PROTHROMBIN TIME: CPT | Performed by: INTERNAL MEDICINE

## 2023-06-16 ENCOUNTER — ANTICOAGULATION VISIT (OUTPATIENT)
Dept: CARDIOLOGY | Facility: CLINIC | Age: 88
End: 2023-06-16
Payer: MEDICARE

## 2023-06-16 DIAGNOSIS — I48.0 PAROXYSMAL ATRIAL FIBRILLATION: Primary | ICD-10-CM

## 2023-06-16 LAB — INR PPP: 3.7 (ref 0.9–1.1)

## 2023-06-16 PROCEDURE — 36416 COLLJ CAPILLARY BLOOD SPEC: CPT | Performed by: INTERNAL MEDICINE

## 2023-06-16 PROCEDURE — 85610 PROTHROMBIN TIME: CPT | Performed by: INTERNAL MEDICINE

## 2023-06-16 PROCEDURE — 93793 ANTICOAG MGMT PT WARFARIN: CPT | Performed by: INTERNAL MEDICINE

## 2023-08-08 ENCOUNTER — ANTICOAGULATION VISIT (OUTPATIENT)
Dept: CARDIOLOGY | Facility: CLINIC | Age: 88
End: 2023-08-08
Payer: MEDICARE

## 2023-08-08 DIAGNOSIS — I48.0 PAROXYSMAL ATRIAL FIBRILLATION: Primary | ICD-10-CM

## 2023-08-08 LAB — INR PPP: 2.1 (ref 0.9–1.1)

## 2023-08-08 PROCEDURE — 93793 ANTICOAG MGMT PT WARFARIN: CPT | Performed by: INTERNAL MEDICINE

## 2023-08-08 PROCEDURE — 85610 PROTHROMBIN TIME: CPT | Performed by: INTERNAL MEDICINE

## 2023-08-08 PROCEDURE — 36416 COLLJ CAPILLARY BLOOD SPEC: CPT | Performed by: INTERNAL MEDICINE

## 2023-08-21 ENCOUNTER — TELEPHONE (OUTPATIENT)
Dept: CARDIOLOGY | Facility: CLINIC | Age: 88
End: 2023-08-21
Payer: MEDICARE

## 2023-08-21 RX ORDER — BUPROPION HYDROCHLORIDE 150 MG/1
150 TABLET ORAL DAILY
COMMUNITY

## 2023-08-21 NOTE — TELEPHONE ENCOUNTER
Caller: ORLANDO    Relationship: SELF    Best call back number: [phone]        What was the call regarding: PT CALLED IN TO STATE HE HAS STARTED ON Bupropion 150MG SLOW RELEASE

## 2023-08-29 ENCOUNTER — ANTICOAGULATION VISIT (OUTPATIENT)
Dept: CARDIOLOGY | Facility: CLINIC | Age: 88
End: 2023-08-29
Payer: MEDICARE

## 2023-08-29 DIAGNOSIS — I48.0 PAROXYSMAL ATRIAL FIBRILLATION: Primary | ICD-10-CM

## 2023-08-29 LAB — INR PPP: 3.6 (ref 0.9–1.1)

## 2023-08-29 PROCEDURE — 85610 PROTHROMBIN TIME: CPT | Performed by: INTERNAL MEDICINE

## 2023-08-29 PROCEDURE — 93793 ANTICOAG MGMT PT WARFARIN: CPT | Performed by: INTERNAL MEDICINE

## 2023-08-29 PROCEDURE — 36416 COLLJ CAPILLARY BLOOD SPEC: CPT | Performed by: INTERNAL MEDICINE

## 2023-09-15 ENCOUNTER — ANTICOAGULATION VISIT (OUTPATIENT)
Dept: CARDIOLOGY | Facility: CLINIC | Age: 88
End: 2023-09-15
Payer: MEDICARE

## 2023-09-15 DIAGNOSIS — I48.0 PAROXYSMAL ATRIAL FIBRILLATION: Primary | ICD-10-CM

## 2023-09-15 LAB — INR PPP: 1.5 (ref 0.9–1.1)

## 2023-09-15 PROCEDURE — 85610 PROTHROMBIN TIME: CPT | Performed by: INTERNAL MEDICINE

## 2023-09-15 PROCEDURE — 93793 ANTICOAG MGMT PT WARFARIN: CPT | Performed by: INTERNAL MEDICINE

## 2023-09-15 PROCEDURE — 36416 COLLJ CAPILLARY BLOOD SPEC: CPT | Performed by: INTERNAL MEDICINE

## 2023-09-19 NOTE — PATIENT INSTRUCTIONS
Increase to schedule listed.   Continue Regimen: isotretinoin 20 mg capsule Detail Level: Zone Render In Strict Bullet Format?: No Initiate Treatment: Eucerin Advanced Repair Lotion QD

## 2023-09-29 ENCOUNTER — ANTICOAGULATION VISIT (OUTPATIENT)
Dept: CARDIOLOGY | Facility: CLINIC | Age: 88
End: 2023-09-29
Payer: MEDICARE

## 2023-09-29 DIAGNOSIS — I48.0 PAROXYSMAL ATRIAL FIBRILLATION: Primary | ICD-10-CM

## 2023-09-29 LAB — INR PPP: 1.8 (ref 0.9–1.1)

## 2023-09-29 PROCEDURE — 85610 PROTHROMBIN TIME: CPT | Performed by: INTERNAL MEDICINE

## 2023-09-29 PROCEDURE — 36416 COLLJ CAPILLARY BLOOD SPEC: CPT | Performed by: INTERNAL MEDICINE

## 2023-09-29 PROCEDURE — 93793 ANTICOAG MGMT PT WARFARIN: CPT | Performed by: INTERNAL MEDICINE

## 2023-10-13 ENCOUNTER — ANTICOAGULATION VISIT (OUTPATIENT)
Dept: CARDIOLOGY | Facility: CLINIC | Age: 88
End: 2023-10-13
Payer: MEDICARE

## 2023-10-13 DIAGNOSIS — I48.0 PAROXYSMAL ATRIAL FIBRILLATION: Primary | ICD-10-CM

## 2023-10-13 LAB — INR PPP: 3.1 (ref 0.9–1.1)

## 2023-10-13 PROCEDURE — 93793 ANTICOAG MGMT PT WARFARIN: CPT | Performed by: INTERNAL MEDICINE

## 2023-10-13 PROCEDURE — 85610 PROTHROMBIN TIME: CPT | Performed by: INTERNAL MEDICINE

## 2023-10-13 PROCEDURE — 36416 COLLJ CAPILLARY BLOOD SPEC: CPT | Performed by: INTERNAL MEDICINE

## 2023-10-16 ENCOUNTER — TELEPHONE (OUTPATIENT)
Dept: CARDIOLOGY | Facility: CLINIC | Age: 88
End: 2023-10-16
Payer: MEDICARE

## 2023-10-16 RX ORDER — WARFARIN SODIUM 5 MG/1
5 TABLET ORAL DAILY
Qty: 90 TABLET | Refills: 0 | Status: SHIPPED | OUTPATIENT
Start: 2023-10-16

## 2023-10-16 RX ORDER — CLOPIDOGREL BISULFATE 75 MG/1
75 TABLET ORAL DAILY
Qty: 90 TABLET | Refills: 3 | Status: SHIPPED | OUTPATIENT
Start: 2023-10-16 | End: 2023-10-16 | Stop reason: SDUPTHER

## 2023-10-16 RX ORDER — WARFARIN SODIUM 7.5 MG/1
7.5 TABLET ORAL WEEKLY
Qty: 90 TABLET | Refills: 0 | Status: SHIPPED | OUTPATIENT
Start: 2023-10-16

## 2023-10-16 RX ORDER — WARFARIN SODIUM 5 MG/1
5 TABLET ORAL DAILY
Qty: 90 TABLET | Refills: 0 | Status: SHIPPED | OUTPATIENT
Start: 2023-10-16 | End: 2023-10-16 | Stop reason: SDUPTHER

## 2023-10-16 RX ORDER — WARFARIN SODIUM 7.5 MG/1
7.5 TABLET ORAL WEEKLY
Qty: 90 TABLET | Refills: 0 | Status: SHIPPED | OUTPATIENT
Start: 2023-10-16 | End: 2023-10-16 | Stop reason: SDUPTHER

## 2023-10-16 RX ORDER — CLOPIDOGREL BISULFATE 75 MG/1
75 TABLET ORAL DAILY
Qty: 90 TABLET | Refills: 3 | Status: SHIPPED | OUTPATIENT
Start: 2023-10-16

## 2023-10-16 NOTE — TELEPHONE ENCOUNTER
Caller: Junior MONI Robles    Relationship: Self    Best call back number: [phone]     Requested Prescriptions:   Requested Prescriptions     Pending Prescriptions Disp Refills    warfarin (COUMADIN) 5 MG tablet 90 tablet 0     Sig: Take 1 tablet by mouth Daily. or as directed.    warfarin (COUMADIN) 7.5 MG tablet       Sig: Take 1 tablet by mouth 1 (One) Time Per Week. Friday    clopidogrel (PLAVIX) 75 MG tablet 90 tablet 3     Sig: Take 1 tablet by mouth Daily.        Pharmacy where request should be sent: [email or web address] - [place] [id number] [address] [facility] - [phone] [facility] [phone] FX     Last office visit with prescribing clinician: 4/20/2022   Last telemedicine visit with prescribing clinician: Visit date not found   Next office visit with prescribing clinician: 11/17/2023     Additional details provided by patient: PT STATES HE IS NEEDING REFILLS CALLED IN TO A NEW PHARMACY SINCE HE SWITCHED INSURANCE - PT REQUESTING 90 DAY REFILLS IF POSSIBLE     Does the patient have less than a 3 day supply:  [] Yes  [x] No    Would you like a call back once the refill request has been completed: [] Yes [] No    If the office needs to give you a call back, can they leave a voicemail: [] Yes [] No    Roselyn Be Rep   10/16/23 10:20 EDT

## 2023-10-16 NOTE — TELEPHONE ENCOUNTER
Caller: Travis Lui MONI    Relationship: Self    Best call back number: [phone] (home)      Requested Prescriptions:   Requested Prescriptions     Pending Prescriptions Disp Refills    warfarin (COUMADIN) 7.5 MG tablet 90 tablet 0     Sig: Take 1 tablet by mouth 1 (One) Time Per Week. Friday    clopidogrel (PLAVIX) 75 MG tablet 90 tablet 3     Sig: Take 1 tablet by mouth Daily.        Pharmacy where request should be sent: [facility] - [address] [phone] [facility] [phone] FX     Last office visit with prescribing clinician: 4/20/2022   Last telemedicine visit with prescribing clinician: Visit date not found   Next office visit with prescribing clinician: 11/17/2023     Additional details provided by patient: PT IS NEEDING TO CHANGE ALL MEDICATIONS DR TALAMANTES PRESCRIBED OVER TO [facility]    Does the patient have less than a 3 day supply:  [] Yes  [x] No    Would you like a call back once the refill request has been completed: [x] Yes [] No    If the office needs to give you a call back, can they leave a voicemail: [x] Yes [] No    Roselyn Cole Rep   10/16/23 14:10 EDT

## 2023-10-23 ENCOUNTER — TELEPHONE (OUTPATIENT)
Dept: CARDIOLOGY | Facility: CLINIC | Age: 88
End: 2023-10-23

## 2023-10-23 NOTE — TELEPHONE ENCOUNTER
Caller: Junior MONI Robles    Relationship: Self    Best call back number: [phone] (home)      What is the best time to reach you: ANYTIME     Who are you requesting to speak with (clinical staff, provider,  specific staff member): DR TALAMANTES OR CLINICAL    What was the call regarding: PT HAS QUESTIONS REGARDING WARFARIN MEDICATION, AND POSSIBLY SWITCHING OVER TO A DIFFERENT MEDICATION, PLEASE CALL PT BACK IN REGARDS TO THIS.     Is it okay if the provider responds through MyChart: PREFERS CALL BACK

## 2023-10-24 NOTE — TELEPHONE ENCOUNTER
Contacted patient & pharmacy. Pharmacy to send warfarin to patient-wanted to verify instructions./DB

## 2023-10-24 NOTE — TELEPHONE ENCOUNTER
Caller: Junior MONI Robles    Relationship: Self    Best call back number: [phone]    What is the best time to reach you: ANY    What was the call regarding: PATIENT SAID HE RECEIVED A LETTER BECAUSE [facility] WAS CALLED IN AND DENIED, BUT HE IS ON WARFARIN    Is it okay if the provider responds through MyChart: PREFERS CALL BACK

## 2023-11-10 ENCOUNTER — ANTICOAGULATION VISIT (OUTPATIENT)
Dept: CARDIOLOGY | Facility: CLINIC | Age: 88
End: 2023-11-10
Payer: MEDICARE

## 2023-11-10 DIAGNOSIS — I48.0 PAROXYSMAL ATRIAL FIBRILLATION: Primary | ICD-10-CM

## 2023-11-10 LAB — INR PPP: 3.2 (ref 0.9–1.1)

## 2023-11-10 PROCEDURE — 85610 PROTHROMBIN TIME: CPT | Performed by: INTERNAL MEDICINE

## 2023-11-10 PROCEDURE — 93793 ANTICOAG MGMT PT WARFARIN: CPT | Performed by: INTERNAL MEDICINE

## 2023-11-10 PROCEDURE — 36416 COLLJ CAPILLARY BLOOD SPEC: CPT | Performed by: INTERNAL MEDICINE

## 2023-11-17 ENCOUNTER — OFFICE VISIT (OUTPATIENT)
Dept: CARDIOLOGY | Facility: CLINIC | Age: 88
End: 2023-11-17
Payer: MEDICARE

## 2023-11-17 VITALS
WEIGHT: 137 LBS | SYSTOLIC BLOOD PRESSURE: 127 MMHG | OXYGEN SATURATION: 98 % | BODY MASS INDEX: 18.56 KG/M2 | DIASTOLIC BLOOD PRESSURE: 61 MMHG | HEART RATE: 61 BPM | HEIGHT: 72 IN

## 2023-11-17 DIAGNOSIS — I25.10 CORONARY ARTERY DISEASE INVOLVING NATIVE CORONARY ARTERY OF NATIVE HEART WITHOUT ANGINA PECTORIS: ICD-10-CM

## 2023-11-17 DIAGNOSIS — I47.10 PAROXYSMAL SUPRAVENTRICULAR TACHYCARDIA: ICD-10-CM

## 2023-11-17 DIAGNOSIS — E78.2 MIXED HYPERLIPIDEMIA: ICD-10-CM

## 2023-11-17 DIAGNOSIS — I49.5 SICK SINUS SYNDROME: Primary | ICD-10-CM

## 2023-11-17 DIAGNOSIS — Z95.0 PRESENCE OF CARDIAC PACEMAKER: ICD-10-CM

## 2023-11-17 DIAGNOSIS — I21.9 MYOCARDIAL INFARCTION, UNSPECIFIED MI TYPE, UNSPECIFIED ARTERY: ICD-10-CM

## 2023-11-17 DIAGNOSIS — I25.5 ISCHEMIC CARDIOMYOPATHY: ICD-10-CM

## 2023-11-17 NOTE — PROGRESS NOTES
Cardiology Office Visit      Encounter Date:  11/17/2023    Patient ID:   Junior MONI Robles is a 88 y.o. male.      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 As you are well aware, this is a 88 y.o. male with history of chronic ischemic heart disease status post previous CABG and subsequent PCI stenting.  He has had  history of atrial fibrillation  and  is on long-term anticoagulation   therapy with warfarin.       Patient underwent AF ablation and  has been feeling much better without any significant symptoms-he does have easy bruisability and takes clopidogrel and warfarin.   Patient had prior AV roc reentrant tachycardia ablation.  He is intolerant to numerous medications including beta-blocker therapy.  He is currently on warfarin to prevent cardioembolic episodes. chads Vasc score--4.  He is status post permanent pacemaker implant       Interval History:  Patient denies any new complaints denies any symptoms of chest pain  dizziness or syncope  Denies any new complaints except for some shortness of breath with exertion and activity   No Dizziness or syncope  Compliant with medical therapy    Interpretation Summary    Abnormal myocardial perfusion study  Large severe intensity myocardial perfusion defect involving the anterior wall anteroseptal wall and anteroapical wall suggestive of prior myocardial infarction in this territory with small brigette-infarct ischemia  Impressions are consistent with an intermediate risk study.  Left ventricular ejection fraction is normal. (Calculated EF = 50%).  Clinical correlation is recommended    Interpretation Summary    The left ventricular cavity is  mildly dilated.  Left ventricular wall thickness is consistent with mild concentric hypertrophy.  Estimated left ventricular EF = 45% Left ventricular systolic function is mildly decreased.  The following left ventricular wall segments are hypokinetic: mid anterior, apical anterior, apical septal, mid inferoseptal, apex hypokinetic and mid anteroseptal.  Estimated right ventricular systolic pressure from tricuspid regurgitation is mildly elevated (35-45 mmHg).  Mild pulmonary hypertension is present.  Left ventricular diastolic function is consistent with (grade II w/high LAP) pseudonormalization.        Assessment & Plan    Impressions:  1- CAD,   Status post CABG.  Status post PCI stenting.  No anginal symptoms.      2- recurrent and  paroxysmal atrial fibrillation -- post ablation doing well very rare palpitations short-lived.    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-- post ablation with complete resolution of symptoms     4- dual-chamber pacemaker in situ with normal function      5-  hypertension  Under excellent control  Exertional shortness of breath and dyspnea  Ischemic cardiomyopathy with LV ejection fraction of 45%  Abnormal stress test with prior myocardial infarction with no significant reversible ischemia    Recommendations:  Continue current medical therapy  Continued aggressive risk factor modification  Patient is currently being considered for possible hip surgery for arthritis  Patient is tolerating anticoagulation therapy without any GI bleeding issues  Patient denies any exertional chest pain does not see any reason to proceed with any further invasive work-up after discussion the results of the stress test and echocardiogram  Continue current conservative medical management  Patient is currently on statin anticoagulation therapy with Coumadin and Plavix  Follow-up in office in 6 months  Denies any new cardiac symptoms  Medication refills  Continue current  "medical therapy with Coumadin patient is tolerating without any further problems and INR is therapeutic  Continue current medical therapy with Crestor 20 mg p.o. once a day Plavix 75 mg p.o. once a day amlodipine 2.5 mg p.o. once a day  Continued aggressive risk factor modification  Labs with primary care physician office  Thanks very much for allowing us to participate in the care of your patients        Vitals:  Vitals:    11/17/23 0947   BP: 127/61   BP Location: Left arm   Patient Position: Sitting   Pulse: 61   SpO2: 98%   Weight: 62.1 kg (137 lb)   Height: 182.9 cm (72\")       Physical Exam:    General: Alert, cooperative, no distress, appears stated age  Head:  Normocephalic, atraumatic, mucous membranes moist  Eyes:  Conjunctiva/corneas clear, EOM's intact     Neck:  Supple,  no adenopathy;      Lungs: Clear to auscultation bilaterally, no wheezes rhonchi rales are noted  Chest wall: No tenderness  Heart::  Regular rate and rhythm, S1 and S2 normal, no murmur, rub or gallop  Abdomen: Soft, non-tender, nondistended bowel sounds active  Extremities: No cyanosis, clubbing, or edema  Pulses: 2+ and symmetric all extremities  Skin:  No rashes or lesions  Neuro/psych: A&O x3. CN II through XII are grossly intact with appropriate affect              Lab Results   Component Value Date    GLUCOSE 116 (H) 12/02/2021    BUN 22 12/02/2021    CREATININE 1.28 (H) 12/02/2021    BCR 17.2 12/02/2021    K 4.3 12/02/2021    CO2 24.0 12/02/2021    CALCIUM 7.5 (L) 12/02/2021    ALBUMIN 3.6 07/31/2018    BILITOT 0.5 07/31/2018    AST 20 07/31/2018    ALT 18 07/31/2018     Results for orders placed during the hospital encounter of 05/26/21    Adult Transthoracic Echo Complete W/ Cont if Necessary Per Protocol    Interpretation Summary  · The left ventricular cavity is mildly dilated.  · Left ventricular wall thickness is consistent with mild concentric hypertrophy.  · Estimated left ventricular EF = 45% Left ventricular systolic " "function is mildly decreased.  · The following left ventricular wall segments are hypokinetic: mid anterior, apical anterior, apical septal, mid inferoseptal, apex hypokinetic and mid anteroseptal.  · Estimated right ventricular systolic pressure from tricuspid regurgitation is mildly elevated (35-45 mmHg).  · Mild pulmonary hypertension is present.  · Left ventricular diastolic function is consistent with (grade II w/high LAP) pseudonormalization.     No results found for this or any previous visit.     No results found for: \"CHOL\", \"CHLPL\", \"TRIG\", \"HDL\", \"LDL\", \"LDLDIRECT\"   Results for orders placed during the hospital encounter of 05/26/21    Stress Test With Myocardial Perfusion One Day    Interpretation Summary  · Abnormal myocardial perfusion study  · Large severe intensity myocardial perfusion defect involving the anterior wall anteroseptal wall and anteroapical wall suggestive of prior myocardial infarction in this territory with small brigette-infarct ischemia  · Impressions are consistent with an intermediate risk study.  · Left ventricular ejection fraction is normal. (Calculated EF = 50%).  · Clinical correlation is recommended   Results for orders placed in visit on 06/05/20    SCANNED - CARDIOLOGY           Objective:          Allergies:  Allergies   Allergen Reactions    Valsartan Other (See Comments)     Weakness, loss of appetite     Atorvastatin Calcium Myalgia    Codeine Other (See Comments)     Lightheaded        Medication Review:     Current Outpatient Medications:     amLODIPine (NORVASC) 2.5 MG tablet, Take 1 tablet by mouth Daily. Take preop, Disp: , Rfl:     buPROPion XL (WELLBUTRIN XL) 150 MG 24 hr tablet, Take 1 tablet by mouth Daily., Disp: , Rfl:     cholecalciferol (Cholecalciferol) 25 MCG (1000 UT) tablet, Take 2 tablets by mouth Daily. Last dose 11/23, Disp: , Rfl:     clopidogrel (PLAVIX) 75 MG tablet, Take 1 tablet by mouth Daily., Disp: 90 tablet, Rfl: 3    ferrous sulfate 325 (65 " FE) MG tablet, Take 1 tablet by mouth Daily With Breakfast., Disp: , Rfl:     pantoprazole (PROTONIX) 40 MG EC tablet, Take 1 tablet by mouth Every Night., Disp: , Rfl:     rosuvastatin (CRESTOR) 20 MG tablet, Take 1 tablet by mouth Every Night., Disp: , Rfl:     warfarin (COUMADIN) 5 MG tablet, Take 1 tablet by mouth Daily. or as directed., Disp: 90 tablet, Rfl: 0    warfarin (COUMADIN) 7.5 MG tablet, Take 1 tablet by mouth 1 (One) Time Per Week. Friday, Disp: 90 tablet, Rfl: 0    Family History:  Family History   Problem Relation Age of Onset    Stomach cancer Mother     Heart disease Father         leaky valve       Past Medical History:  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    AVNRT Ablation    Dubose esophagus     CAD (coronary artery disease)     S/P CABG and PCI    Diverticulitis     Gastroesophageal reflux disease     Hyperlipidemia     Hypertension     Myocardial infarction     anterior    Paroxysmal atrial fibrillation     havent been in afib    Pneumonia     SSS (sick sinus syndrome)     SSS, Toby, tachy; pacemaker implantation/ [employer]       Past surgical History:  Past Surgical History:   Procedure Laterality Date    CARDIAC CATHETERIZATION      2001:2003; 2007:2010:[facility] 12-15-15/ unsuccessful attempt PCI of obtuse marginal branch of circumflex. represents failed 2nd attempt of PCI of this vessel.    COLON SURGERY      9 inches on colon removed    COLONOSCOPY      CORONARY ANGIOPLASTY      CORONARY ARTERY BYPASS GRAFT  08/15/2001    x4    CORONARY STENT PLACEMENT  02/25/2007    LCX     HERNIA REPAIR Right     HERNIA REPAIR Left     INSERT / REPLACE / REMOVE PACEMAKER  08/05/2016    [facility] [employer] pacemaker implantation    PROSTATE SURGERY  03/31/2019    REZUM: prostate    ROTATOR CUFF REPAIR Left     TOTAL HIP ARTHROPLASTY Left 04/04/2018    [facility] Dr. Smith    TOTAL HIP ARTHROPLASTY Right  11/30/2021    Procedure: TOTAL HIP ARTHROPLASTY ANTERIOR WITH HANA TABLE;  Surgeon: Frantz Hickman MD;  Location: [facility] MAIN OR;  Service: Orthopedics;  Laterality: Right;    VENTRICULAR ABLATION SURGERY  01/2018    SVT ablation [facility]       Social History:  Social History     Socioeconomic History    Marital status:    Tobacco Use    Smoking status: Some Days     Types: Cigars    Smokeless tobacco: Never    Tobacco comments:     2  cigars  per day   Vaping Use    Vaping Use: Never used   Substance and Sexual Activity    Alcohol use: No    Drug use: No    Sexual activity: Defer       Review of Systems:  The following systems were reviewed as they relate to the cardiovascular system: Constitutional, Eyes, ENT, Cardiovascular, Respiratory, Gastrointestinal, Integumentary, Neurological, Psychiatric, Hematologic, Endocrine, Musculoskeletal, and Genitourinary. The pertinent cardiovascular findings are reported above with all other cardiovascular points within those systems being negative.    Diagnostic Study Review:     Current Electrocardiogram:    ECG 12 Lead    Date/Time: 11/17/2023 10:19 AM  Performed by: Holly Spivey MD    Authorized by: Holly Spivey MD  Comparison: compared with previous ECG   Similar to previous ECG  Rhythm: sinus rhythm  Rate: normal  BPM: 68  Conduction: conduction normal  QRS axis: normal  Other findings: non-specific ST-T wave changes    Clinical impression: abnormal EKG                NOTE: The following portions of the patient's history were reviewed and updated this visit as appropriate: allergies, current medications, past family history, past medical history, past social history, past surgical history and problem list.

## 2023-12-08 ENCOUNTER — ANTICOAGULATION VISIT (OUTPATIENT)
Dept: CARDIOLOGY | Facility: CLINIC | Age: 88
End: 2023-12-08
Payer: MEDICARE

## 2023-12-08 DIAGNOSIS — I48.0 PAROXYSMAL ATRIAL FIBRILLATION: Primary | ICD-10-CM

## 2023-12-08 LAB — INR PPP: 2.2 (ref 0.9–1.1)

## 2023-12-08 PROCEDURE — 36416 COLLJ CAPILLARY BLOOD SPEC: CPT | Performed by: INTERNAL MEDICINE

## 2023-12-08 PROCEDURE — 85610 PROTHROMBIN TIME: CPT | Performed by: INTERNAL MEDICINE

## 2023-12-08 PROCEDURE — 93793 ANTICOAG MGMT PT WARFARIN: CPT | Performed by: INTERNAL MEDICINE

## 2023-12-15 ENCOUNTER — OFFICE VISIT (OUTPATIENT)
Dept: CARDIOLOGY | Facility: CLINIC | Age: 88
End: 2023-12-15
Payer: MEDICARE

## 2023-12-15 VITALS
DIASTOLIC BLOOD PRESSURE: 62 MMHG | OXYGEN SATURATION: 100 % | SYSTOLIC BLOOD PRESSURE: 124 MMHG | HEART RATE: 72 BPM | WEIGHT: 135 LBS | HEIGHT: 72 IN | BODY MASS INDEX: 18.28 KG/M2

## 2023-12-15 DIAGNOSIS — Z95.0 PRESENCE OF CARDIAC PACEMAKER: ICD-10-CM

## 2023-12-15 DIAGNOSIS — I25.10 CORONARY ARTERY DISEASE INVOLVING NATIVE CORONARY ARTERY OF NATIVE HEART WITHOUT ANGINA PECTORIS: ICD-10-CM

## 2023-12-15 DIAGNOSIS — I48.0 PAROXYSMAL ATRIAL FIBRILLATION: Primary | ICD-10-CM

## 2023-12-15 DIAGNOSIS — E78.2 MIXED HYPERLIPIDEMIA: ICD-10-CM

## 2023-12-15 DIAGNOSIS — I49.5 SICK SINUS SYNDROME: ICD-10-CM

## 2023-12-15 DIAGNOSIS — I47.10 PAROXYSMAL SUPRAVENTRICULAR TACHYCARDIA: ICD-10-CM

## 2023-12-15 PROCEDURE — 99214 OFFICE O/P EST MOD 30 MIN: CPT | Performed by: INTERNAL MEDICINE

## 2023-12-15 PROCEDURE — 1159F MED LIST DOCD IN RCRD: CPT | Performed by: INTERNAL MEDICINE

## 2023-12-15 PROCEDURE — 1160F RVW MEDS BY RX/DR IN RCRD: CPT | Performed by: INTERNAL MEDICINE

## 2023-12-15 NOTE — PROGRESS NOTES
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  Patient has sick sinus syndrome with a dual-chamber pacemaker in situ.  Patient had prior AV roc reentrant tachycardia ablation with resolution.  And history of coronary artery disease with prior history of bypass surgery and PCI.  He does have paroxysmal atrial fibrillation and prior AF ablation was done in 2018.         Past Medical History:     Reviewed history from 01/26/2018 and no changes required:        Coronary artery disease: S/P CABG and PCI         Hyperlipidemia        Myocardial infarction, anterior        Hypertension        Gastroesophageal reflux disease        Dubose's esophagus        Diverticulitis        Atrial Fibrillation: new onset 12-4-15        SSS, Toby, Tachy : Pacemaker implantation / [employer]         Atrial Fibrillation, paroxysmal         pneumonia        AVNRT ablation--1/2018    Past Surgical History:     Reviewed history from 04/19/2018 and no changes required:        CABG x 4, 8-15-01        PCI/stent, LCX,  2-25-07        Hernia repair        Left rotator cuff repair        9 inches of colon removed        Colonoscopy        Cardiac Cath: 2001:2003;  2007: 2010:         [facility] 12-15-15 / unsuccessful attempt PCI of obtuse marginal branch of circumflex. Represents failed 2nd attempt of PCI of this vessel.         Herniorrhaphy Left Side        [facility] : 8-5-16- [employer] Pacemaker Implantation        SVT ablation 1/2018 [facility]        Left ATHR 4/4/18 Dr. Smith          Physical Exam    General:      well developed, well nourished, in no acute distress.    Head:      normocephalic and atraumatic.    Eyes:      PERRL/EOM intact, conjunctivae and sclerae clear without nystagmus.    Neck:      no   thyromegaly, trachea central with normal respiratory effort  Lungs:      clear bilaterally to auscultation.    Heart:       regular rate and rhythm, S1, S2 without murmurs, rubs, or gallops  Skin:      intact without lesions or rashes.    Psych:      alert and cooperative; normal mood and affect; normal attention span and concentration.                Assessment plan    Latest INR of 2.2.  Paroxysmal atrial fibrillation transient lasting for few seconds   Remote history of AV roc reentrant tachycardia ablation with resolution  Dual-chamber pacemaker in situ and home monitoring reviewed  Coronary artery disease stable without angina  Essential hypertension well-controlled with amlodipine  Hyperlipidemia treated with rosuvastatin  Patient adequately anticoagulated with warfarin followed by Coumadin clinic  Medications reviewed and follow-up appointments made      Electronically signed by Froilan Luque MD, 12/15/23, 9:19 AM EST.

## 2024-01-05 ENCOUNTER — ANTICOAGULATION VISIT (OUTPATIENT)
Dept: CARDIOLOGY | Facility: CLINIC | Age: 89
End: 2024-01-05
Payer: MEDICARE

## 2024-01-05 DIAGNOSIS — I48.0 PAROXYSMAL ATRIAL FIBRILLATION: Primary | ICD-10-CM

## 2024-01-05 LAB — INR PPP: 2.3 (ref 0.9–1.1)

## 2024-01-05 PROCEDURE — 93793 ANTICOAG MGMT PT WARFARIN: CPT | Performed by: INTERNAL MEDICINE

## 2024-01-05 PROCEDURE — 36416 COLLJ CAPILLARY BLOOD SPEC: CPT | Performed by: INTERNAL MEDICINE

## 2024-01-05 PROCEDURE — 85610 PROTHROMBIN TIME: CPT | Performed by: INTERNAL MEDICINE

## 2024-02-01 RX ORDER — WARFARIN SODIUM 5 MG/1
5 TABLET ORAL DAILY
Qty: 90 TABLET | Refills: 0 | Status: SHIPPED | OUTPATIENT
Start: 2024-02-01

## 2024-02-01 NOTE — TELEPHONE ENCOUNTER
Caller: TravisJunior MONTENEGRO    Relationship: Self    Best call back number: [phone]    Requested Prescriptions:   Requested Prescriptions     Pending Prescriptions Disp Refills    warfarin (COUMADIN) 5 MG tablet 90 tablet 0     Sig: Take 1 tablet by mouth Daily. or as directed.        Pharmacy where request should be sent: [facility] [address] [phone] [facility] [phone] FX     Last office visit with prescribing clinician: 12/15/2023   Last telemedicine visit with prescribing clinician: Visit date not found   Next office visit with prescribing clinician: 6/21/2024     Additional details provided by patient: PT NEEDS 3 MONTH RX     Does the patient have less than a 3 day supply:  [] Yes  [x] No    Would you like a call back once the refill request has been completed: [] Yes [x] No    If the office needs to give you a call back, can they leave a voicemail: [x] Yes [] No    Roselyn [facility] Rep   02/01/24 09:52 EST

## 2024-02-02 ENCOUNTER — ANTICOAGULATION VISIT (OUTPATIENT)
Dept: CARDIOLOGY | Facility: CLINIC | Age: 89
End: 2024-02-02
Payer: MEDICARE

## 2024-02-02 DIAGNOSIS — I48.0 PAROXYSMAL ATRIAL FIBRILLATION: Primary | ICD-10-CM

## 2024-02-02 LAB — INR PPP: 2.4 (ref 0.9–1.1)

## 2024-02-02 PROCEDURE — 93793 ANTICOAG MGMT PT WARFARIN: CPT | Performed by: INTERNAL MEDICINE

## 2024-02-02 PROCEDURE — 36416 COLLJ CAPILLARY BLOOD SPEC: CPT | Performed by: INTERNAL MEDICINE

## 2024-02-02 PROCEDURE — 85610 PROTHROMBIN TIME: CPT | Performed by: INTERNAL MEDICINE

## 2024-03-01 ENCOUNTER — ANTICOAGULATION VISIT (OUTPATIENT)
Dept: CARDIOLOGY | Facility: CLINIC | Age: 89
End: 2024-03-01
Payer: MEDICARE

## 2024-03-01 DIAGNOSIS — I48.0 PAROXYSMAL ATRIAL FIBRILLATION: Primary | ICD-10-CM

## 2024-03-01 LAB — INR PPP: 2.1 (ref 0.9–1.1)

## 2024-03-01 PROCEDURE — 85610 PROTHROMBIN TIME: CPT | Performed by: INTERNAL MEDICINE

## 2024-03-01 PROCEDURE — 36416 COLLJ CAPILLARY BLOOD SPEC: CPT | Performed by: INTERNAL MEDICINE

## 2024-03-01 PROCEDURE — 93793 ANTICOAG MGMT PT WARFARIN: CPT | Performed by: INTERNAL MEDICINE

## 2024-03-22 ENCOUNTER — ANTICOAGULATION VISIT (OUTPATIENT)
Dept: CARDIOLOGY | Facility: CLINIC | Age: 89
End: 2024-03-22
Payer: MEDICARE

## 2024-03-22 DIAGNOSIS — I48.0 PAROXYSMAL ATRIAL FIBRILLATION: Primary | ICD-10-CM

## 2024-03-22 LAB — INR PPP: 1.9 (ref 0.9–1.1)

## 2024-03-22 PROCEDURE — 36416 COLLJ CAPILLARY BLOOD SPEC: CPT | Performed by: INTERNAL MEDICINE

## 2024-03-22 PROCEDURE — 93793 ANTICOAG MGMT PT WARFARIN: CPT | Performed by: INTERNAL MEDICINE

## 2024-03-22 PROCEDURE — 85610 PROTHROMBIN TIME: CPT | Performed by: INTERNAL MEDICINE

## 2024-04-23 ENCOUNTER — ANTICOAGULATION VISIT (OUTPATIENT)
Dept: CARDIOLOGY | Facility: CLINIC | Age: 89
End: 2024-04-23
Payer: MEDICARE

## 2024-04-23 DIAGNOSIS — I48.0 PAROXYSMAL ATRIAL FIBRILLATION: Primary | ICD-10-CM

## 2024-04-23 LAB — INR PPP: 1.5 (ref 0.9–1.1)

## 2024-04-23 PROCEDURE — 36416 COLLJ CAPILLARY BLOOD SPEC: CPT | Performed by: INTERNAL MEDICINE

## 2024-04-23 PROCEDURE — 93793 ANTICOAG MGMT PT WARFARIN: CPT | Performed by: INTERNAL MEDICINE

## 2024-04-23 PROCEDURE — 85610 PROTHROMBIN TIME: CPT | Performed by: INTERNAL MEDICINE

## 2024-05-14 ENCOUNTER — ANTICOAGULATION VISIT (OUTPATIENT)
Dept: CARDIOLOGY | Facility: CLINIC | Age: 89
End: 2024-05-14
Payer: MEDICARE

## 2024-05-14 DIAGNOSIS — I48.0 PAROXYSMAL ATRIAL FIBRILLATION: Primary | ICD-10-CM

## 2024-05-14 LAB — INR PPP: 2.3 (ref 0.9–1.1)

## 2024-05-14 PROCEDURE — 93793 ANTICOAG MGMT PT WARFARIN: CPT | Performed by: INTERNAL MEDICINE

## 2024-05-14 PROCEDURE — 36416 COLLJ CAPILLARY BLOOD SPEC: CPT | Performed by: INTERNAL MEDICINE

## 2024-05-14 PROCEDURE — 85610 PROTHROMBIN TIME: CPT | Performed by: INTERNAL MEDICINE

## 2024-05-17 ENCOUNTER — OFFICE VISIT (OUTPATIENT)
Dept: CARDIOLOGY | Facility: CLINIC | Age: 89
End: 2024-05-17
Payer: MEDICARE

## 2024-05-17 ENCOUNTER — TELEPHONE (OUTPATIENT)
Dept: CARDIOLOGY | Facility: CLINIC | Age: 89
End: 2024-05-17

## 2024-05-17 VITALS
SYSTOLIC BLOOD PRESSURE: 137 MMHG | OXYGEN SATURATION: 100 % | BODY MASS INDEX: 19.1 KG/M2 | WEIGHT: 141 LBS | DIASTOLIC BLOOD PRESSURE: 58 MMHG | HEIGHT: 72 IN | HEART RATE: 67 BPM

## 2024-05-17 DIAGNOSIS — I47.10 PAROXYSMAL SUPRAVENTRICULAR TACHYCARDIA: ICD-10-CM

## 2024-05-17 DIAGNOSIS — Z95.0 PRESENCE OF CARDIAC PACEMAKER: ICD-10-CM

## 2024-05-17 DIAGNOSIS — I25.10 CORONARY ARTERY DISEASE INVOLVING NATIVE CORONARY ARTERY OF NATIVE HEART WITHOUT ANGINA PECTORIS: ICD-10-CM

## 2024-05-17 DIAGNOSIS — I21.9 MYOCARDIAL INFARCTION, UNSPECIFIED MI TYPE, UNSPECIFIED ARTERY: ICD-10-CM

## 2024-05-17 DIAGNOSIS — I49.5 SICK SINUS SYNDROME: Primary | ICD-10-CM

## 2024-05-17 DIAGNOSIS — I25.5 ISCHEMIC CARDIOMYOPATHY: ICD-10-CM

## 2024-05-17 DIAGNOSIS — E78.2 MIXED HYPERLIPIDEMIA: ICD-10-CM

## 2024-05-17 NOTE — TELEPHONE ENCOUNTER
Caller: Junior MONI Robles    Relationship: Self    Best call back number: [phone]      PATIENT CALLED IN TO HAVE THE WARFARIN 7.5 TAKEN OFF HIS MED LIST AS HE ONLY TAKES THE 5

## 2024-05-17 NOTE — PROGRESS NOTES
Cardiology Office Visit      Encounter Date:  05/17/2024    Patient ID:   Junior MONI Robles is a 88 y.o. male.      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 As you are well aware, this is a 88 y.o. male with history of chronic ischemic heart disease status post previous CABG and subsequent PCI stenting.  He has had  history of atrial fibrillation  and  is on long-term anticoagulation   therapy with warfarin.       Patient underwent AF ablation and  has been feeling much better without any significant symptoms-he does have easy bruisability and takes clopidogrel and warfarin.   Patient had prior AV roc reentrant tachycardia ablation.  He is intolerant to numerous medications including beta-blocker therapy.  He is currently on warfarin to prevent cardioembolic episodes. chads Vasc score--4.  He is status post permanent pacemaker implant       Interval History:  Patient denies any new complaints denies any symptoms of chest pain  dizziness or syncope  Denies any new complaints except for some shortness of breath with exertion and activity   No Dizziness or syncope  Compliant with medical therapy    Interpretation Summary    Abnormal myocardial perfusion study  Large severe intensity myocardial perfusion defect involving the anterior wall anteroseptal wall and anteroapical wall suggestive of prior myocardial infarction in this territory with small brigette-infarct ischemia  Impressions are consistent with an intermediate risk study.  Left ventricular ejection fraction is normal. (Calculated EF = 50%).  Clinical correlation is recommended    Interpretation Summary    The left ventricular cavity is  mildly dilated.  Left ventricular wall thickness is consistent with mild concentric hypertrophy.  Estimated left ventricular EF = 45% Left ventricular systolic function is mildly decreased.  The following left ventricular wall segments are hypokinetic: mid anterior, apical anterior, apical septal, mid inferoseptal, apex hypokinetic and mid anteroseptal.  Estimated right ventricular systolic pressure from tricuspid regurgitation is mildly elevated (35-45 mmHg).  Mild pulmonary hypertension is present.  Left ventricular diastolic function is consistent with (grade II w/high LAP) pseudonormalization.        Assessment & Plan    Impressions:  1- CAD,   Status post CABG.  Status post PCI stenting.  No anginal symptoms.      2- recurrent and  paroxysmal atrial fibrillation -- post ablation doing well very rare palpitations short-lived.    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-- post ablation with complete resolution of symptoms     4- dual-chamber pacemaker in situ with normal function      5-  hypertension  Under excellent control  Exertional shortness of breath and dyspnea  Ischemic cardiomyopathy with LV ejection fraction of 45%  Abnormal stress test with prior myocardial infarction with no significant reversible ischemia    Recommendations:  Continue current medical therapy  Continued aggressive risk factor modification  Patient is currently being considered for possible hip surgery for arthritis  Patient is tolerating anticoagulation therapy without any GI bleeding issues  Patient denies any exertional chest pain does not see any reason to proceed with any further invasive work-up after discussion the results of the stress test and echocardiogram  Continue current conservative medical management  Patient is currently on statin anticoagulation therapy with Coumadin and Plavix  Follow-up in office in 6 months  Denies any new cardiac symptoms  Medication refills  Continue current  "medical therapy with Coumadin patient is tolerating without any further problems and INR is therapeutic  Continue current medical therapy with Crestor 20 mg p.o. once a day Plavix 75 mg p.o. once a day amlodipine 2.5 mg p.o. once a day  Continued aggressive risk factor modification  Labs with primary care physician office  Thanks very much for allowing us to participate in the care of your patients        Vitals:  Vitals:    05/17/24 0950   BP: 137/58   Pulse: 67   SpO2: 100%   Weight: 64 kg (141 lb)   Height: 182.9 cm (72\")       Physical Exam:    General: Alert, cooperative, no distress, appears stated age  Head:  Normocephalic, atraumatic, mucous membranes moist  Eyes:  Conjunctiva/corneas clear, EOM's intact     Neck:  Supple,  no adenopathy;      Lungs: Clear to auscultation bilaterally, no wheezes rhonchi rales are noted  Chest wall: No tenderness  Heart::  Regular rate and rhythm, S1 and S2 normal, no murmur, rub or gallop  Abdomen: Soft, non-tender, nondistended bowel sounds active  Extremities: No cyanosis, clubbing, or edema  Pulses: 2+ and symmetric all extremities  Skin:  No rashes or lesions  Neuro/psych: A&O x3. CN II through XII are grossly intact with appropriate affect              Lab Results   Component Value Date    GLUCOSE 116 (H) 12/02/2021    BUN 22 12/02/2021    CREATININE 1.28 (H) 12/02/2021    BCR 17.2 12/02/2021    K 4.3 12/02/2021    CO2 24.0 12/02/2021    CALCIUM 7.5 (L) 12/02/2021    ALBUMIN 3.6 07/31/2018    BILITOT 0.5 07/31/2018    AST 20 07/31/2018    ALT 18 07/31/2018     Results for orders placed during the hospital encounter of 05/26/21    Adult Transthoracic Echo Complete W/ Cont if Necessary Per Protocol    Interpretation Summary  · The left ventricular cavity is mildly dilated.  · Left ventricular wall thickness is consistent with mild concentric hypertrophy.  · Estimated left ventricular EF = 45% Left ventricular systolic function is mildly decreased.  · The following left " "ventricular wall segments are hypokinetic: mid anterior, apical anterior, apical septal, mid inferoseptal, apex hypokinetic and mid anteroseptal.  · Estimated right ventricular systolic pressure from tricuspid regurgitation is mildly elevated (35-45 mmHg).  · Mild pulmonary hypertension is present.  · Left ventricular diastolic function is consistent with (grade II w/high LAP) pseudonormalization.     No results found for this or any previous visit.     No results found for: \"CHOL\", \"CHLPL\", \"TRIG\", \"HDL\", \"LDL\", \"LDLDIRECT\"   Results for orders placed during the hospital encounter of 05/26/21    Stress Test With Myocardial Perfusion One Day    Interpretation Summary  · Abnormal myocardial perfusion study  · Large severe intensity myocardial perfusion defect involving the anterior wall anteroseptal wall and anteroapical wall suggestive of prior myocardial infarction in this territory with small brigette-infarct ischemia  · Impressions are consistent with an intermediate risk study.  · Left ventricular ejection fraction is normal. (Calculated EF = 50%).  · Clinical correlation is recommended   Results for orders placed in visit on 06/05/20    SCANNED - CARDIOLOGY           Objective:          Allergies:  Allergies   Allergen Reactions    Valsartan Other (See Comments)     Weakness, loss of appetite     Atorvastatin Calcium Myalgia    Codeine Other (See Comments)     Lightheaded        Medication Review:     Current Outpatient Medications:     amLODIPine (NORVASC) 2.5 MG tablet, Take 1 tablet by mouth Daily. Take preop, Disp: , Rfl:     buPROPion XL (WELLBUTRIN XL) 150 MG 24 hr tablet, Take 1 tablet by mouth Daily., Disp: , Rfl:     cholecalciferol (Cholecalciferol) 25 MCG (1000 UT) tablet, Take 2 tablets by mouth Daily. Last dose 11/23, Disp: , Rfl:     clopidogrel (PLAVIX) 75 MG tablet, Take 1 tablet by mouth Daily., Disp: 90 tablet, Rfl: 3    ferrous sulfate 325 (65 FE) MG tablet, Take 1 tablet by mouth Daily With " Breakfast., Disp: , Rfl:     pantoprazole (PROTONIX) 40 MG EC tablet, Take 1 tablet by mouth Every Night., Disp: , Rfl:     rosuvastatin (CRESTOR) 20 MG tablet, Take 1 tablet by mouth Every Night., Disp: , Rfl:     warfarin (COUMADIN) 5 MG tablet, Take 1 tablet by mouth Daily. or as directed., Disp: 90 tablet, Rfl: 0    warfarin (COUMADIN) 7.5 MG tablet, Take 1 tablet by mouth 1 (One) Time Per Week. Friday, Disp: 90 tablet, Rfl: 0    Family History:  Family History   Problem Relation Age of Onset    Stomach cancer Mother     Heart disease Father         leaky valve       Past Medical History:  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    AVNRT Ablation    Dubose esophagus     CAD (coronary artery disease)     S/P CABG and PCI    Diverticulitis     Gastroesophageal reflux disease     Hyperlipidemia     Hypertension     Myocardial infarction     anterior    Paroxysmal atrial fibrillation     havent been in afib    Pneumonia     SSS (sick sinus syndrome)     SSS, Toby, tachy; pacemaker implantation/ [employer]       Past surgical History:  Past Surgical History:   Procedure Laterality Date    CARDIAC CATHETERIZATION      2001:2003; 2007:2010:[facility] 12-15-15/ unsuccessful attempt PCI of obtuse marginal branch of circumflex. represents failed 2nd attempt of PCI of this vessel.    COLON SURGERY      9 inches on colon removed    COLONOSCOPY      CORONARY ANGIOPLASTY      CORONARY ARTERY BYPASS GRAFT  08/15/2001    x4    CORONARY STENT PLACEMENT  02/25/2007    LCX     HERNIA REPAIR Right     HERNIA REPAIR Left     INSERT / REPLACE / REMOVE PACEMAKER  08/05/2016    [facility] [employer] pacemaker implantation    PROSTATE SURGERY  03/31/2019    REZUM: prostate    ROTATOR CUFF REPAIR Left     TOTAL HIP ARTHROPLASTY Left 04/04/2018    [facility]HR Dr. Smith    TOTAL HIP ARTHROPLASTY Right 11/30/2021    Procedure: TOTAL HIP ARTHROPLASTY  ANTERIOR WITH HANA TABLE;  Surgeon: Frantz Hickman MD;  Location: [facility] MAIN OR;  Service: Orthopedics;  Laterality: Right;    VENTRICULAR ABLATION SURGERY  01/2018    SVT ablation [facility]       Social History:  Social History     Socioeconomic History    Marital status:    Tobacco Use    Smoking status: Some Days     Types: Cigars    Smokeless tobacco: Never    Tobacco comments:     2  cigars  per day   Vaping Use    Vaping status: Never Used   Substance and Sexual Activity    Alcohol use: No    Drug use: No    Sexual activity: Defer       Review of Systems:  The following systems were reviewed as they relate to the cardiovascular system: Constitutional, Eyes, ENT, Cardiovascular, Respiratory, Gastrointestinal, Integumentary, Neurological, Psychiatric, Hematologic, Endocrine, Musculoskeletal, and Genitourinary. The pertinent cardiovascular findings are reported above with all other cardiovascular points within those systems being negative.    Diagnostic Study Review:     Current Electrocardiogram:    ECG 12 Lead    Date/Time: 5/17/2024 10:26 AM  Performed by: Holly Spivey MD    Authorized by: Holly Spivey MD  Comparison: compared with previous ECG   Similar to previous ECG  Rhythm: sinus rhythm  Rate: normal  BPM: 67  Conduction: conduction normal  QRS axis: normal  Other findings: non-specific ST-T wave changes and left ventricular hypertrophy    Clinical impression: abnormal EKG                NOTE: The following portions of the patient's history were reviewed and updated this visit as appropriate: allergies, current medications, past family history, past medical history, past social history, past surgical history and problem list.

## 2024-06-21 ENCOUNTER — ANTICOAGULATION VISIT (OUTPATIENT)
Dept: CARDIOLOGY | Facility: CLINIC | Age: 89
End: 2024-06-21
Payer: MEDICARE

## 2024-06-21 ENCOUNTER — OFFICE VISIT (OUTPATIENT)
Dept: CARDIOLOGY | Facility: CLINIC | Age: 89
End: 2024-06-21
Payer: MEDICARE

## 2024-06-21 VITALS
SYSTOLIC BLOOD PRESSURE: 112 MMHG | HEART RATE: 90 BPM | BODY MASS INDEX: 18.83 KG/M2 | OXYGEN SATURATION: 100 % | WEIGHT: 139 LBS | HEIGHT: 72 IN | DIASTOLIC BLOOD PRESSURE: 68 MMHG

## 2024-06-21 DIAGNOSIS — I48.0 PAROXYSMAL ATRIAL FIBRILLATION: Primary | ICD-10-CM

## 2024-06-21 DIAGNOSIS — Z95.0 PRESENCE OF CARDIAC PACEMAKER: ICD-10-CM

## 2024-06-21 DIAGNOSIS — I49.5 SICK SINUS SYNDROME: Primary | ICD-10-CM

## 2024-06-21 DIAGNOSIS — I25.10 CORONARY ARTERY DISEASE INVOLVING NATIVE CORONARY ARTERY OF NATIVE HEART WITHOUT ANGINA PECTORIS: ICD-10-CM

## 2024-06-21 DIAGNOSIS — E78.2 MIXED HYPERLIPIDEMIA: ICD-10-CM

## 2024-06-21 DIAGNOSIS — I48.0 PAROXYSMAL ATRIAL FIBRILLATION: ICD-10-CM

## 2024-06-21 LAB — INR PPP: 2.8 (ref 0.9–1.1)

## 2024-06-21 PROCEDURE — 1160F RVW MEDS BY RX/DR IN RCRD: CPT | Performed by: INTERNAL MEDICINE

## 2024-06-21 PROCEDURE — 1159F MED LIST DOCD IN RCRD: CPT | Performed by: INTERNAL MEDICINE

## 2024-06-21 PROCEDURE — 99214 OFFICE O/P EST MOD 30 MIN: CPT | Performed by: INTERNAL MEDICINE

## 2024-06-21 PROCEDURE — 36416 COLLJ CAPILLARY BLOOD SPEC: CPT | Performed by: INTERNAL MEDICINE

## 2024-06-21 PROCEDURE — 85610 PROTHROMBIN TIME: CPT | Performed by: INTERNAL MEDICINE

## 2024-06-21 NOTE — PROGRESS NOTES
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  Patient had remote history of AV node ablation and tachycardia which was ablated without recurrence.  He also has history of coronary artery disease with prior history of bypass surgery and PCI.  Patient history of proximal atrial fibrillation and prior AF ablation was done in 2018.         Past Medical History:     Reviewed history from 01/26/2018 and no changes required:        Coronary artery disease: S/P CABG and PCI         Hyperlipidemia        Myocardial infarction, anterior        Hypertension        Gastroesophageal reflux disease        Dubose's esophagus        Diverticulitis        Atrial Fibrillation: new onset 12-4-15        SSS, Toby, Tachy : Pacemaker implantation / [employer]         Atrial Fibrillation, paroxysmal         pneumonia        AVNRT ablation--1/2018    Past Surgical History:     Reviewed history from 04/19/2018 and no changes required:        CABG x 4, 8-15-01        PCI/stent, LCX,  2-25-07        Hernia repair        Left rotator cuff repair        9 inches of colon removed        Colonoscopy        Cardiac Cath: 2001:2003;  2007: 2010:         [facility] 12-15-15 / unsuccessful attempt PCI of obtuse marginal branch of circumflex. Represents failed 2nd attempt of PCI of this vessel.         Herniorrhaphy Left Side        [facility] : 8-5-16- [employer] Pacemaker Implantation        SVT ablation 1/2018 [facility]        Left ATHR 4/4/18 Dr. Smith        Physical Exam    General:      well developed, well nourished, in no acute distress.    Head:      normocephalic and atraumatic.    Eyes:      PERRL/EOM intact, conjunctivae and sclerae clear without nystagmus.     Neck:      no  thyromegaly, trachea central with normal respiratory effort  Lungs:      clear bilaterally to auscultation.    Heart:       regular rate and rhythm, S1, S2 without murmurs, rubs, or gallops  Skin:      intact without lesions or rashes.    Psych:      alert and cooperative; normal mood and affect; normal attention span and concentration.          Assessment plan    Latest INR of 2.8  Paroxysmal atrial fibrillation without symptoms currently on warfarin which is mostly transient  Remote history of AV roc reentrant tachycardia ablation with resolution  Dual-chamber pacemaker in situ and home monitoring reviewed 6 months of battery life which was discussed with patient  Coronary artery disease stable without angina on Plavix  Hyperlipidemia on rosuvastatin  Hypertension controlled with amlodipine  Medications reviewed and follow-up appointments made        Electronically signed by Froilan Luque MD, 06/21/24, 9:25 AM EDT.

## 2024-07-19 ENCOUNTER — ANTICOAGULATION VISIT (OUTPATIENT)
Dept: CARDIOLOGY | Facility: CLINIC | Age: 89
End: 2024-07-19
Payer: MEDICARE

## 2024-07-19 DIAGNOSIS — I48.0 PAROXYSMAL ATRIAL FIBRILLATION: Primary | ICD-10-CM

## 2024-07-19 LAB — INR PPP: 5.7 (ref 0.9–1.1)

## 2024-07-19 PROCEDURE — 93793 ANTICOAG MGMT PT WARFARIN: CPT | Performed by: INTERNAL MEDICINE

## 2024-07-19 PROCEDURE — 85610 PROTHROMBIN TIME: CPT | Performed by: INTERNAL MEDICINE

## 2024-07-19 PROCEDURE — 36416 COLLJ CAPILLARY BLOOD SPEC: CPT | Performed by: INTERNAL MEDICINE

## 2024-07-23 ENCOUNTER — ANTICOAGULATION VISIT (OUTPATIENT)
Dept: CARDIOLOGY | Facility: CLINIC | Age: 89
End: 2024-07-23
Payer: MEDICARE

## 2024-07-23 DIAGNOSIS — I48.0 PAROXYSMAL ATRIAL FIBRILLATION: Primary | ICD-10-CM

## 2024-07-23 LAB — INR PPP: 5.6 (ref 0.9–1.1)

## 2024-07-23 PROCEDURE — 85610 PROTHROMBIN TIME: CPT | Performed by: INTERNAL MEDICINE

## 2024-07-23 PROCEDURE — 93793 ANTICOAG MGMT PT WARFARIN: CPT | Performed by: INTERNAL MEDICINE

## 2024-07-23 PROCEDURE — 36416 COLLJ CAPILLARY BLOOD SPEC: CPT | Performed by: INTERNAL MEDICINE

## 2024-07-26 ENCOUNTER — ANTICOAGULATION VISIT (OUTPATIENT)
Dept: CARDIOLOGY | Facility: CLINIC | Age: 89
End: 2024-07-26
Payer: MEDICARE

## 2024-07-26 DIAGNOSIS — I48.0 PAROXYSMAL ATRIAL FIBRILLATION: Primary | ICD-10-CM

## 2024-07-26 LAB — INR PPP: 2.8 (ref 0.9–1.1)

## 2024-07-26 PROCEDURE — 85610 PROTHROMBIN TIME: CPT | Performed by: INTERNAL MEDICINE

## 2024-07-26 PROCEDURE — 93793 ANTICOAG MGMT PT WARFARIN: CPT | Performed by: INTERNAL MEDICINE

## 2024-07-26 PROCEDURE — 36416 COLLJ CAPILLARY BLOOD SPEC: CPT | Performed by: INTERNAL MEDICINE

## 2024-08-09 ENCOUNTER — ANTICOAGULATION VISIT (OUTPATIENT)
Dept: CARDIOLOGY | Facility: CLINIC | Age: 89
End: 2024-08-09
Payer: MEDICARE

## 2024-08-09 DIAGNOSIS — I48.0 PAROXYSMAL ATRIAL FIBRILLATION: Primary | ICD-10-CM

## 2024-08-09 LAB — INR PPP: 3.2 (ref 0.9–1.1)

## 2024-08-09 PROCEDURE — 36416 COLLJ CAPILLARY BLOOD SPEC: CPT | Performed by: INTERNAL MEDICINE

## 2024-08-09 PROCEDURE — 93793 ANTICOAG MGMT PT WARFARIN: CPT | Performed by: INTERNAL MEDICINE

## 2024-08-09 PROCEDURE — 85610 PROTHROMBIN TIME: CPT | Performed by: INTERNAL MEDICINE

## 2024-08-30 ENCOUNTER — ANTICOAGULATION VISIT (OUTPATIENT)
Dept: CARDIOLOGY | Facility: CLINIC | Age: 89
End: 2024-08-30
Payer: MEDICARE

## 2024-08-30 DIAGNOSIS — I48.0 PAROXYSMAL ATRIAL FIBRILLATION: Primary | ICD-10-CM

## 2024-08-30 LAB — INR PPP: 2.5 (ref 0.9–1.1)

## 2024-08-30 PROCEDURE — 93793 ANTICOAG MGMT PT WARFARIN: CPT | Performed by: INTERNAL MEDICINE

## 2024-08-30 PROCEDURE — 85610 PROTHROMBIN TIME: CPT | Performed by: INTERNAL MEDICINE

## 2024-08-30 PROCEDURE — 36416 COLLJ CAPILLARY BLOOD SPEC: CPT | Performed by: INTERNAL MEDICINE

## 2024-09-20 ENCOUNTER — ANTICOAGULATION VISIT (OUTPATIENT)
Dept: CARDIOLOGY | Facility: CLINIC | Age: 89
End: 2024-09-20
Payer: MEDICARE

## 2024-09-20 DIAGNOSIS — I48.0 PAROXYSMAL ATRIAL FIBRILLATION: Primary | ICD-10-CM

## 2024-09-20 LAB — INR PPP: 2 (ref 0.9–1.1)

## 2024-09-20 PROCEDURE — 93793 ANTICOAG MGMT PT WARFARIN: CPT | Performed by: INTERNAL MEDICINE

## 2024-09-20 PROCEDURE — 36416 COLLJ CAPILLARY BLOOD SPEC: CPT | Performed by: INTERNAL MEDICINE

## 2024-09-20 PROCEDURE — 85610 PROTHROMBIN TIME: CPT | Performed by: INTERNAL MEDICINE

## 2024-09-23 ENCOUNTER — OFFICE VISIT (OUTPATIENT)
Dept: CARDIOLOGY | Facility: CLINIC | Age: 89
End: 2024-09-23
Payer: MEDICARE

## 2024-09-23 VITALS
BODY MASS INDEX: 18.42 KG/M2 | WEIGHT: 136 LBS | HEIGHT: 72 IN | SYSTOLIC BLOOD PRESSURE: 130 MMHG | OXYGEN SATURATION: 100 % | HEART RATE: 70 BPM | DIASTOLIC BLOOD PRESSURE: 70 MMHG

## 2024-09-23 DIAGNOSIS — Z95.0 PRESENCE OF CARDIAC PACEMAKER: ICD-10-CM

## 2024-09-23 DIAGNOSIS — I49.5 SICK SINUS SYNDROME: ICD-10-CM

## 2024-09-23 DIAGNOSIS — I25.10 CORONARY ARTERY DISEASE INVOLVING NATIVE CORONARY ARTERY OF NATIVE HEART WITHOUT ANGINA PECTORIS: ICD-10-CM

## 2024-09-23 DIAGNOSIS — E78.2 MIXED HYPERLIPIDEMIA: ICD-10-CM

## 2024-09-23 DIAGNOSIS — I48.0 PAROXYSMAL ATRIAL FIBRILLATION: Primary | ICD-10-CM

## 2024-09-23 PROCEDURE — 93280 PM DEVICE PROGR EVAL DUAL: CPT | Performed by: INTERNAL MEDICINE

## 2024-09-23 PROCEDURE — 1159F MED LIST DOCD IN RCRD: CPT | Performed by: INTERNAL MEDICINE

## 2024-09-23 PROCEDURE — 93000 ELECTROCARDIOGRAM COMPLETE: CPT | Performed by: INTERNAL MEDICINE

## 2024-09-23 PROCEDURE — 99214 OFFICE O/P EST MOD 30 MIN: CPT | Performed by: INTERNAL MEDICINE

## 2024-09-23 PROCEDURE — 1160F RVW MEDS BY RX/DR IN RCRD: CPT | Performed by: INTERNAL MEDICINE

## 2024-09-23 RX ORDER — MULTIVIT WITH MINERALS/LUTEIN
500 TABLET ORAL DAILY
COMMUNITY

## 2024-09-23 RX ORDER — ROSUVASTATIN CALCIUM 40 MG/1
1 TABLET, COATED ORAL DAILY
COMMUNITY

## 2024-09-23 RX ORDER — DIPHENOXYLATE HYDROCHLORIDE AND ATROPINE SULFATE 2.5; .025 MG/1; MG/1
TABLET ORAL DAILY
COMMUNITY

## 2024-09-23 RX ORDER — DOCUSATE SODIUM 100 MG/1
100 CAPSULE, LIQUID FILLED ORAL 2 TIMES DAILY
COMMUNITY

## 2024-10-18 ENCOUNTER — ANTICOAGULATION VISIT (OUTPATIENT)
Dept: CARDIOLOGY | Facility: CLINIC | Age: 89
End: 2024-10-18
Payer: MEDICARE

## 2024-10-18 DIAGNOSIS — I48.0 PAROXYSMAL ATRIAL FIBRILLATION: Primary | ICD-10-CM

## 2024-10-18 LAB — INR PPP: 2.9 (ref 0.9–1.1)

## 2024-10-18 PROCEDURE — 85610 PROTHROMBIN TIME: CPT | Performed by: INTERNAL MEDICINE

## 2024-10-18 PROCEDURE — 36416 COLLJ CAPILLARY BLOOD SPEC: CPT | Performed by: INTERNAL MEDICINE

## 2024-10-18 PROCEDURE — 93793 ANTICOAG MGMT PT WARFARIN: CPT | Performed by: INTERNAL MEDICINE

## 2024-11-07 ENCOUNTER — OFFICE VISIT (OUTPATIENT)
Dept: CARDIOLOGY | Facility: CLINIC | Age: 89
End: 2024-11-07
Payer: MEDICARE

## 2024-11-07 VITALS
HEIGHT: 72 IN | DIASTOLIC BLOOD PRESSURE: 65 MMHG | SYSTOLIC BLOOD PRESSURE: 124 MMHG | OXYGEN SATURATION: 99 % | HEART RATE: 76 BPM | BODY MASS INDEX: 18.15 KG/M2 | WEIGHT: 134 LBS

## 2024-11-07 DIAGNOSIS — I25.10 CORONARY ARTERY DISEASE INVOLVING NATIVE CORONARY ARTERY OF NATIVE HEART WITHOUT ANGINA PECTORIS: ICD-10-CM

## 2024-11-07 DIAGNOSIS — Z95.0 PRESENCE OF CARDIAC PACEMAKER: ICD-10-CM

## 2024-11-07 DIAGNOSIS — E78.2 MIXED HYPERLIPIDEMIA: ICD-10-CM

## 2024-11-07 DIAGNOSIS — I48.0 PAROXYSMAL ATRIAL FIBRILLATION: Primary | ICD-10-CM

## 2024-11-07 DIAGNOSIS — I49.5 SICK SINUS SYNDROME: ICD-10-CM

## 2024-11-07 NOTE — PROGRESS NOTES
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  No new symptoms    Previous history attached below for reference    88-year-old male patient well-known to me has pacemaker in situ and comes in for follow-up patient had prior AV roc reentry tachycardia ablation for AVNRT which was ablated several years ago without clinical recurrence.  He does have history of paroxysmal atrial fibrillation coronary disease with prior history of bypass surgery and PCI and had AF ablation in 2018.      Past Medical History:     Reviewed history from 01/26/2018 and no changes required:        Coronary artery disease: S/P CABG and PCI         Hyperlipidemia        Myocardial infarction, anterior        Hypertension        Gastroesophageal reflux disease        Dubose's esophagus        Diverticulitis        Atrial Fibrillation: new onset 12-4-15        SSS, Toby, Tachy : Pacemaker implantation / [employer]         Atrial Fibrillation, paroxysmal         pneumonia        AVNRT ablation--1/2018    Past Surgical History:     Reviewed history from 04/19/2018 and no changes required:        CABG x 4, 8-15-01        PCI/stent, LCX,  2-25-07        Hernia repair        Left rotator cuff repair        9 inches of colon removed        Colonoscopy        Cardiac Cath: 2001:2003;  2007: 2010:         [facility] 12-15-15 / unsuccessful attempt PCI of obtuse marginal branch of circumflex. Represents failed 2nd attempt of PCI of this vessel.         Herniorrhaphy Left Side        [facility] : 8-5-16- [employer] Pacemaker Implantation        SVT ablation 1/2018 [facility]        Left ATHR 4/4/18 Dr. Smith        Physical Exam    General:      well developed, well nourished, in no acute distress.     Head:      normocephalic and atraumatic.    Eyes:      PERRL/EOM intact, conjunctivae and sclerae clear without nystagmus.    Neck:      no  thyromegaly, trachea central with normal respiratory effort  Lungs:      clear bilaterally to auscultation.    Heart:       regular rate and rhythm, S1, S2 without murmurs, rubs, or gallops  Skin:      intact without lesions or rashes.    Psych:      alert and cooperative; normal mood and affect; normal attention span and concentration.                Assessment plan    Pacemaker interrogation close to BRAD but not reached BRAD.  Interrogation attached to chart and pacemaker to be rechecked in a month  Dual-chamber pacemaker in situ with sick sinus syndrome  Coronary artery disease stable without angina on Plavix  Hyperlipidemia on rosuvastatin  Hypertension controlled amlodipine  Anticoagulation with warfarin  Medications reviewed and follow-up appointments made        Electronically signed by Froilan Luque MD, 11/07/24, 9:02 AM EST.

## 2024-11-15 ENCOUNTER — ANTICOAGULATION VISIT (OUTPATIENT)
Dept: CARDIOLOGY | Facility: CLINIC | Age: 89
End: 2024-11-15
Payer: MEDICARE

## 2024-11-15 ENCOUNTER — OFFICE VISIT (OUTPATIENT)
Dept: CARDIOLOGY | Facility: CLINIC | Age: 89
End: 2024-11-15
Payer: MEDICARE

## 2024-11-15 VITALS
SYSTOLIC BLOOD PRESSURE: 145 MMHG | HEART RATE: 70 BPM | HEIGHT: 72 IN | DIASTOLIC BLOOD PRESSURE: 68 MMHG | BODY MASS INDEX: 18.42 KG/M2 | WEIGHT: 136 LBS | OXYGEN SATURATION: 97 %

## 2024-11-15 DIAGNOSIS — I47.10 PAROXYSMAL SUPRAVENTRICULAR TACHYCARDIA: ICD-10-CM

## 2024-11-15 DIAGNOSIS — Z95.0 PRESENCE OF CARDIAC PACEMAKER: ICD-10-CM

## 2024-11-15 DIAGNOSIS — I25.5 ISCHEMIC CARDIOMYOPATHY: ICD-10-CM

## 2024-11-15 DIAGNOSIS — I49.5 SICK SINUS SYNDROME: Primary | ICD-10-CM

## 2024-11-15 DIAGNOSIS — I25.10 CORONARY ARTERY DISEASE INVOLVING NATIVE CORONARY ARTERY OF NATIVE HEART WITHOUT ANGINA PECTORIS: ICD-10-CM

## 2024-11-15 DIAGNOSIS — E78.2 MIXED HYPERLIPIDEMIA: ICD-10-CM

## 2024-11-15 DIAGNOSIS — I48.0 PAROXYSMAL ATRIAL FIBRILLATION: Primary | ICD-10-CM

## 2024-11-15 LAB — INR PPP: 2.8 (ref 0.9–1.1)

## 2024-11-15 PROCEDURE — 93000 ELECTROCARDIOGRAM COMPLETE: CPT | Performed by: INTERNAL MEDICINE

## 2024-11-15 PROCEDURE — 93793 ANTICOAG MGMT PT WARFARIN: CPT | Performed by: INTERNAL MEDICINE

## 2024-11-15 PROCEDURE — 99214 OFFICE O/P EST MOD 30 MIN: CPT | Performed by: INTERNAL MEDICINE

## 2024-11-15 PROCEDURE — 1159F MED LIST DOCD IN RCRD: CPT | Performed by: INTERNAL MEDICINE

## 2024-11-15 PROCEDURE — 1160F RVW MEDS BY RX/DR IN RCRD: CPT | Performed by: INTERNAL MEDICINE

## 2024-11-15 PROCEDURE — 36416 COLLJ CAPILLARY BLOOD SPEC: CPT | Performed by: INTERNAL MEDICINE

## 2024-11-15 PROCEDURE — 85610 PROTHROMBIN TIME: CPT | Performed by: INTERNAL MEDICINE

## 2024-11-15 NOTE — PROGRESS NOTES
Cardiology Office Visit      Encounter Date:  11/15/2024    Patient ID:   Junior MONI Robles is a 89 y.o. male.      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 As you are well aware, this is a 89 y.o. male with history of chronic ischemic heart disease status post previous CABG and subsequent PCI stenting.  He has had  history of atrial fibrillation  and  is on long-term anticoagulation therapy with warfarin.       Patient underwent AF ablation and  has been feeling much better without any significant symptoms-he does have easy bruisability and takes clopidogrel and warfarin.   Patient had prior AV roc reentrant tachycardia ablation.  He is intolerant to numerous medications including beta-blocker therapy.  He is currently on warfarin to prevent cardioembolic episodes. chads Vasc score--4.  He is status post permanent pacemaker implant       Interval History:  Patient denies any new complaints denies any symptoms of chest pain  dizziness or syncope  Denies any new complaints except for some shortness of breath with exertion and activity   No Dizziness or syncope  Compliant with medical therapy    Interpretation Summary    Abnormal myocardial perfusion study  Large severe intensity myocardial perfusion defect involving the anterior wall anteroseptal wall and anteroapical wall suggestive of prior myocardial infarction in this territory with small brigette-infarct ischemia  Impressions are consistent with an intermediate risk study.  Left ventricular ejection fraction is normal. (Calculated EF = 50%).  Clinical correlation is recommended    Interpretation Summary    The left ventricular cavity is  mildly dilated.  Left ventricular wall thickness is consistent with mild concentric hypertrophy.  Estimated left ventricular EF = 45% Left ventricular systolic function is mildly decreased.  The following left ventricular wall segments are hypokinetic: mid anterior, apical anterior, apical septal, mid inferoseptal, apex hypokinetic and mid anteroseptal.  Estimated right ventricular systolic pressure from tricuspid regurgitation is mildly elevated (35-45 mmHg).  Mild pulmonary hypertension is present.  Left ventricular diastolic function is consistent with (grade II w/high LAP) pseudonormalization.        Assessment & Plan    Impressions:  1- CAD,   Status post CABG.  Status post PCI stenting.  No anginal symptoms.      2- recurrent and  paroxysmal atrial fibrillation -- post ablation doing well very rare palpitations short-lived.    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-- post ablation with complete resolution of symptoms     4- dual-chamber pacemaker in situ with normal function      5-  hypertension  Under excellent control  Exertional shortness of breath and dyspnea  Ischemic cardiomyopathy with LV ejection fraction of 45%  Abnormal stress test with prior myocardial infarction with no significant reversible ischemia  Pacemaker end-of-life currently being closely followed up with the EP for possible device battery change    Recommendations:  Continue current medical therapy  Continued aggressive risk factor modification  Patient is currently being considered for possible hip surgery for arthritis  Patient is tolerating anticoagulation therapy without any GI bleeding issues  Patient denies any exertional chest pain does not see any reason to proceed with any further invasive work-up after discussion the results of the stress test and echocardiogram  Continue current conservative medical management  Patient is currently on statin anticoagulation therapy with Coumadin and  "Plavix  Follow-up in office in 6 months  Denies any new cardiac symptoms  Medication refills  Continue current medical therapy with Coumadin patient is tolerating without any further problems and INR is therapeutic  Continue current medical therapy with Crestor 20 mg p.o. once a day Plavix 75 mg p.o. once a day amlodipine 2.5 mg p.o. once a day  Continued aggressive risk factor modification  Labs with primary care physician office  Thanks very much for allowing us to participate in the care of your patients        Vitals:  Vitals:    11/15/24 1029   BP: 145/68   Pulse: 70   SpO2: 97%   Weight: 61.7 kg (136 lb)   Height: 182.9 cm (72\")       Physical Exam:    General: Alert, cooperative, no distress, appears stated age  Head:  Normocephalic, atraumatic, mucous membranes moist  Eyes:  Conjunctiva/corneas clear, EOM's intact     Neck:  Supple,  no adenopathy;      Lungs: Clear to auscultation bilaterally, no wheezes rhonchi rales are noted  Chest wall: No tenderness  Heart::  Regular rate and rhythm, S1 and S2 normal, no murmur, rub or gallop  Abdomen: Soft, non-tender, nondistended bowel sounds active  Extremities: No cyanosis, clubbing, or edema  Pulses: 2+ and symmetric all extremities  Skin:  No rashes or lesions  Neuro/psych: A&O x3. CN II through XII are grossly intact with appropriate affect              Lab Results   Component Value Date    GLUCOSE 116 (H) 12/02/2021    BUN 22 12/02/2021    CREATININE 1.28 (H) 12/02/2021    BCR 17.2 12/02/2021    K 4.3 12/02/2021    CO2 24.0 12/02/2021    CALCIUM 7.5 (L) 12/02/2021    ALBUMIN 3.6 07/31/2018    BILITOT 0.5 07/31/2018    AST 20 07/31/2018    ALT 18 07/31/2018     Results for orders placed during the hospital encounter of 05/26/21    Adult Transthoracic Echo Complete W/ Cont if Necessary Per Protocol    Interpretation Summary  · The left ventricular cavity is mildly dilated.  · Left ventricular wall thickness is consistent with mild concentric hypertrophy.  · " "Estimated left ventricular EF = 45% Left ventricular systolic function is mildly decreased.  · The following left ventricular wall segments are hypokinetic: mid anterior, apical anterior, apical septal, mid inferoseptal, apex hypokinetic and mid anteroseptal.  · Estimated right ventricular systolic pressure from tricuspid regurgitation is mildly elevated (35-45 mmHg).  · Mild pulmonary hypertension is present.  · Left ventricular diastolic function is consistent with (grade II w/high LAP) pseudonormalization.     No results found for this or any previous visit.     No results found for: \"CHOL\", \"CHLPL\", \"TRIG\", \"HDL\", \"LDL\", \"LDLDIRECT\"   Results for orders placed during the hospital encounter of 05/26/21    Stress Test With Myocardial Perfusion One Day    Interpretation Summary  · Abnormal myocardial perfusion study  · Large severe intensity myocardial perfusion defect involving the anterior wall anteroseptal wall and anteroapical wall suggestive of prior myocardial infarction in this territory with small brigette-infarct ischemia  · Impressions are consistent with an intermediate risk study.  · Left ventricular ejection fraction is normal. (Calculated EF = 50%).  · Clinical correlation is recommended   Results for orders placed in visit on 06/05/20    SCANNED - CARDIOLOGY           Objective:          Allergies:  Allergies   Allergen Reactions    Valsartan Other (See Comments)     Weakness, loss of appetite     Atorvastatin Calcium Myalgia    Codeine Other (See Comments)     Lightheaded     Atorvastatin Other (See Comments)    Montelukast Other (See Comments)    Promethazine Other (See Comments)       Medication Review:     Current Outpatient Medications:     amLODIPine (NORVASC) 2.5 MG tablet, Take 1 tablet by mouth Daily. Take preop, Disp: , Rfl:     cholecalciferol (Cholecalciferol) 25 MCG (1000 UT) tablet, Take 2 tablets by mouth Daily. Last dose 11/23, Disp: , Rfl:     clopidogrel (PLAVIX) 75 MG tablet, Take 1 " tablet by mouth Daily., Disp: 90 tablet, Rfl: 3    docusate sodium (COLACE) 100 MG capsule, Take 1 capsule by mouth 2 (Two) Times a Day., Disp: , Rfl:     ferrous sulfate 325 (65 FE) MG tablet, Take 1 tablet by mouth Daily With Breakfast., Disp: , Rfl:     multivitamin (MULTI-DAY VITAMINS PO), Take  by mouth Daily., Disp: , Rfl:     pantoprazole (PROTONIX) 40 MG EC tablet, Take 1 tablet by mouth Every Night., Disp: , Rfl:     Potassium 99 MG tablet, Take  by mouth., Disp: , Rfl:     rosuvastatin (CRESTOR) 40 MG tablet, Take 1 tablet by mouth Daily., Disp: , Rfl:     vitamin C (ASCORBIC ACID) 250 MG tablet, Take 2 tablets by mouth Daily., Disp: , Rfl:     warfarin (COUMADIN) 5 MG tablet, Take 1 tablet by mouth Daily. or as directed., Disp: 90 tablet, Rfl: 0    Family History:  Family History   Problem Relation Age of Onset    Stomach cancer Mother     Heart disease Father         Past. Away       Past Medical History:  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    AVNRT Ablation    Dubose esophagus     CAD (coronary artery disease)     S/P CABG and PCI    Diverticulitis     Gastroesophageal reflux disease     Hyperlipidemia     Hypertension     Myocardial infarction     anterior    Paroxysmal atrial fibrillation     havent been in afib    Pneumonia     SSS (sick sinus syndrome)     SSS, Toby, tachy; pacemaker implantation/ [employer]       Past surgical History:  Past Surgical History:   Procedure Laterality Date    CARDIAC CATHETERIZATION      2001:2003; 2007:2010:[facility] 12-15-15/ unsuccessful attempt PCI of obtuse marginal branch of circumflex. represents failed 2nd attempt of PCI of this vessel.    COLON SURGERY      9 inches on colon removed    COLONOSCOPY      CORONARY ANGIOPLASTY      CORONARY ARTERY BYPASS GRAFT  08/15/2001    x4    CORONARY STENT PLACEMENT  02/25/2007    LCX     HERNIA REPAIR Right     HERNIA  REPAIR Left     INSERT / REPLACE / REMOVE PACEMAKER  08/05/2016    [facility] [employer] pacemaker implantation    PROSTATE SURGERY  03/31/2019    REZUM: prostate    ROTATOR CUFF REPAIR Left     TOTAL HIP ARTHROPLASTY Left 04/04/2018    ATHR Dr. Smith    TOTAL HIP ARTHROPLASTY Right 11/30/2021    Procedure: TOTAL HIP ARTHROPLASTY ANTERIOR WITH HANA TABLE;  Surgeon: Frantz Hickman MD;  Location: [facility] MAIN OR;  Service: Orthopedics;  Laterality: Right;    VENTRICULAR ABLATION SURGERY  01/2018    SVT ablation [facility]       Social History:  Social History     Socioeconomic History    Marital status:    Tobacco Use    Smoking status: Some Days     Types: Pipe    Smokeless tobacco: Never    Tobacco comments:     Used filtered pipe   Vaping Use    Vaping status: Never Used   Substance and Sexual Activity    Alcohol use: No    Drug use: No    Sexual activity: Not Currently     Partners: Female       Review of Systems:  The following systems were reviewed as they relate to the cardiovascular system: Constitutional, Eyes, ENT, Cardiovascular, Respiratory, Gastrointestinal, Integumentary, Neurological, Psychiatric, Hematologic, Endocrine, Musculoskeletal, and Genitourinary. The pertinent cardiovascular findings are reported above with all other cardiovascular points within those systems being negative.    Diagnostic Study Review:     Current Electrocardiogram:    ECG 12 Lead    Date/Time: 11/15/2024 10:48 AM  Performed by: Holly Spivey MD    Authorized by: Holly Spivey MD  Comparison: compared with previous ECG   Similar to previous ECG  Rhythm: sinus rhythm  Rate: normal  BPM: 55  Conduction: conduction normal  QRS axis: normal  Other findings: non-specific ST-T wave changes and left ventricular hypertrophy    Clinical impression: abnormal EKG                NOTE: The following portions of the patient's history were reviewed and updated this visit as appropriate: allergies, current medications, past  family history, past medical history, past social history, past surgical history and problem list.

## 2024-12-06 ENCOUNTER — ANTICOAGULATION VISIT (OUTPATIENT)
Dept: CARDIOLOGY | Facility: CLINIC | Age: 89
End: 2024-12-06
Payer: MEDICARE

## 2024-12-06 DIAGNOSIS — I48.0 PAROXYSMAL ATRIAL FIBRILLATION: Primary | ICD-10-CM

## 2024-12-06 LAB — INR PPP: 1.4 (ref 0.9–1.1)

## 2024-12-06 PROCEDURE — 85610 PROTHROMBIN TIME: CPT | Performed by: INTERNAL MEDICINE

## 2024-12-06 PROCEDURE — 36416 COLLJ CAPILLARY BLOOD SPEC: CPT | Performed by: INTERNAL MEDICINE

## 2024-12-06 PROCEDURE — 93793 ANTICOAG MGMT PT WARFARIN: CPT | Performed by: INTERNAL MEDICINE

## 2024-12-10 ENCOUNTER — TELEPHONE (OUTPATIENT)
Dept: CARDIOLOGY | Facility: CLINIC | Age: 89
End: 2024-12-10

## 2024-12-10 NOTE — TELEPHONE ENCOUNTER
Caller: Junior MONI Robles    Relationship to patient: Self    Best call back number: [phone]     Patient is needing: PT IS NEEDING BATTERY INSTALLED IN PACEMAKER. LAST OFFICE VISIT NOTE WITH DR JACQUES DOES HAVE FU INSTRUCTION, BUT PT WANTED TO WAIT TO SEE ABOUT THE DEVICE BATTERY. PLS CALL TO SET PT UP WITH APPT.

## 2024-12-17 ENCOUNTER — PREP FOR SURGERY (OUTPATIENT)
Dept: OTHER | Facility: HOSPITAL | Age: 89
End: 2024-12-17
Payer: MEDICARE

## 2024-12-17 ENCOUNTER — ANTICOAGULATION VISIT (OUTPATIENT)
Dept: CARDIOLOGY | Facility: CLINIC | Age: 89
End: 2024-12-17
Payer: MEDICARE

## 2024-12-17 DIAGNOSIS — I49.5 SICK SINUS SYNDROME: ICD-10-CM

## 2024-12-17 DIAGNOSIS — Z45.010 PACEMAKER BATTERY DEPLETION: Primary | ICD-10-CM

## 2024-12-17 DIAGNOSIS — Z95.0 PRESENCE OF CARDIAC PACEMAKER: ICD-10-CM

## 2024-12-17 DIAGNOSIS — I48.0 PAROXYSMAL ATRIAL FIBRILLATION: Primary | ICD-10-CM

## 2024-12-17 LAB — INR PPP: 1.9 (ref 0.9–1.1)

## 2024-12-17 PROCEDURE — 85610 PROTHROMBIN TIME: CPT | Performed by: INTERNAL MEDICINE

## 2024-12-17 PROCEDURE — 93793 ANTICOAG MGMT PT WARFARIN: CPT | Performed by: INTERNAL MEDICINE

## 2024-12-17 PROCEDURE — 36416 COLLJ CAPILLARY BLOOD SPEC: CPT | Performed by: INTERNAL MEDICINE

## 2024-12-17 RX ORDER — SODIUM CHLORIDE 9 MG/ML
40 INJECTION, SOLUTION INTRAVENOUS AS NEEDED
OUTPATIENT
Start: 2024-12-17

## 2024-12-17 RX ORDER — SODIUM CHLORIDE 0.9 % (FLUSH) 0.9 %
3-10 SYRINGE (ML) INJECTION AS NEEDED
OUTPATIENT
Start: 2024-12-17

## 2024-12-17 RX ORDER — SODIUM CHLORIDE 0.9 % (FLUSH) 0.9 %
3 SYRINGE (ML) INJECTION EVERY 12 HOURS SCHEDULED
OUTPATIENT
Start: 2024-12-17

## 2024-12-21 ENCOUNTER — HOSPITAL ENCOUNTER (OUTPATIENT)
Facility: HOSPITAL | Age: 89
Discharge: HOME | End: 2024-12-21
Attending: EMERGENCY MEDICINE | Admitting: EMERGENCY MEDICINE
Payer: MEDICARE

## 2024-12-21 VITALS
HEART RATE: 98 BPM | BODY MASS INDEX: 18.56 KG/M2 | SYSTOLIC BLOOD PRESSURE: 142 MMHG | HEIGHT: 72 IN | OXYGEN SATURATION: 98 % | RESPIRATION RATE: 18 BRPM | DIASTOLIC BLOOD PRESSURE: 64 MMHG | TEMPERATURE: 98.1 F | WEIGHT: 137 LBS

## 2024-12-21 DIAGNOSIS — S69.91XA INJURY OF FINGER OF RIGHT HAND, INITIAL ENCOUNTER: Primary | ICD-10-CM

## 2024-12-21 PROCEDURE — 90471 IMMUNIZATION ADMIN: CPT | Performed by: EMERGENCY MEDICINE

## 2024-12-21 PROCEDURE — 25010000002 TETANUS-DIPHTH-ACELL PERTUSSIS 5-2.5-18.5 LF-MCG/0.5 SUSPENSION PREFILLED SYRINGE: Performed by: EMERGENCY MEDICINE

## 2024-12-21 PROCEDURE — 90715 TDAP VACCINE 7 YRS/> IM: CPT | Performed by: EMERGENCY MEDICINE

## 2024-12-21 PROCEDURE — G0463 HOSPITAL OUTPT CLINIC VISIT: HCPCS

## 2024-12-21 RX ADMIN — TETANUS TOXOID, REDUCED DIPHTHERIA TOXOID AND ACELLULAR PERTUSSIS VACCINE, ADSORBED 0.5 ML: 5; 2.5; 8; 8; 2.5 SUSPENSION INTRAMUSCULAR at 14:57

## 2024-12-21 NOTE — FSED PROVIDER NOTE
Subjective   History of Present Illness  Patient is an 89-year-old male who presents today with 2 small paper cuts to his right middle finger that occurred approximately 2 days ago.  He reports he is on warfarin and he will get the bleeding to stop, and then it will intermittently begin to bleed.  Currently bleeding is controlled with a bandage.  He denies loss of sensation, numbness or tingling.        Review of Systems   Skin:  Positive for wound.   All other systems reviewed and are negative.      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    AVNRT Ablation    Dubose esophagus     CAD (coronary artery disease)     S/P CABG and PCI    Diverticulitis     Gastroesophageal reflux disease     Hyperlipidemia     Hypertension     Myocardial infarction     anterior    Paroxysmal atrial fibrillation     havent been in afib    Pneumonia     SSS (sick sinus syndrome)     SSS, Toby, tachy; pacemaker implantation/ [employer]       Allergies   Allergen Reactions    Valsartan Other (See Comments)     Weakness, loss of appetite     Atorvastatin Calcium Myalgia    Codeine Other (See Comments)     Lightheaded     Atorvastatin Other (See Comments)    Montelukast Other (See Comments)    Promethazine Other (See Comments)       Past Surgical History:   Procedure Laterality Date    CARDIAC CATHETERIZATION      2001:2003; 2007:2010:[facility] 12-15-15/ unsuccessful attempt PCI of obtuse marginal branch of circumflex. represents failed 2nd attempt of PCI of this vessel.    COLON SURGERY      9 inches on colon removed    COLONOSCOPY      CORONARY ANGIOPLASTY      CORONARY ARTERY BYPASS GRAFT  08/15/2001    x4    CORONARY STENT PLACEMENT  02/25/2007    LCX     HERNIA REPAIR Right     HERNIA REPAIR Left     INSERT / REPLACE / REMOVE PACEMAKER  08/05/2016    [facility] [employer] pacemaker implantation    PROSTATE SURGERY  03/31/2019    REZUM:  prostate    ROTATOR CUFF REPAIR Left     TOTAL HIP ARTHROPLASTY Left 04/04/2018    ATHR Dr. Smith    TOTAL HIP ARTHROPLASTY Right 11/30/2021    Procedure: TOTAL HIP ARTHROPLASTY ANTERIOR WITH HANA TABLE;  Surgeon: Frantz Hickman MD;  Location: [facility] MAIN OR;  Service: Orthopedics;  Laterality: Right;    VENTRICULAR ABLATION SURGERY  01/2018    SVT ablation [facility]       Family History   Problem Relation Age of Onset    Stomach cancer Mother     Heart disease Father         Past. Away       Social History     Socioeconomic History    Marital status:    Tobacco Use    Smoking status: Some Days     Types: Pipe    Smokeless tobacco: Never    Tobacco comments:     Used filtered pipe   Vaping Use    Vaping status: Never Used   Substance and Sexual Activity    Alcohol use: No    Drug use: No    Sexual activity: Not Currently     Partners: Female           Objective   Physical Exam  Vitals and nursing note reviewed.   Constitutional:       Appearance: Normal appearance.   HENT:      Head: Normocephalic.      Nose: Nose normal.      Mouth/Throat:      Mouth: Mucous membranes are moist.   Cardiovascular:      Rate and Rhythm: Normal rate and regular rhythm.      Pulses: Normal pulses.      Heart sounds: Normal heart sounds.   Pulmonary:      Effort: Pulmonary effort is normal.      Breath sounds: Normal breath sounds.   Musculoskeletal:         General: Signs of injury present. Normal range of motion.      Cervical back: Normal range of motion.   Skin:     General: Skin is warm.      Capillary Refill: Capillary refill takes less than 2 seconds.   Neurological:      General: No focal deficit present.      Mental Status: He is alert.   Psychiatric:         Mood and Affect: Mood normal.         Behavior: Behavior normal.         Procedures           ED Course                                           Medical Decision Making  Patient is an 89-year-old male who presents today with 2 small paper cuts to his right middle finger  that occurred approximately 2 days ago.  He reports he is on warfarin and he will get the bleeding to stop, and then it will intermittently begin to bleed.  Currently bleeding is controlled with a bandage.  He denies loss of sensation, numbness or tingling.    Upon exam patient is awake and alert, nontoxic-appearing, appears in no acute distress, and is answering questions appropriately.  Lung sounds are clear and equal bilaterally.  Heart is normal rate and rhythm.  Mucous membranes are moist.  Patient has 2 small paper cuts to his to his right middle finger.  Currently the wound is not bleeding.  He reports he has his INR checked every month and it has been normal.  Hemostat dressing was placed, and wound was irrigated.  I advised him to keep this in place for 24 hours.  I updated his tetanus vaccination.  I advised him to follow-up with his primary care as needed and to return to the ER with any new or worsening symptoms.    Problems Addressed:  Injury of finger of right hand, initial encounter: complicated acute illness or injury    Risk  Prescription drug management.        Final diagnoses:   Injury of finger of right hand, initial encounter       ED Disposition  ED Disposition       ED Disposition   Discharge    Condition   Stable    Comment   --               Chace Cano MD  [address] [place] [address]  [phone]    Schedule an appointment as soon as possible for a visit            Medication List      No changes were made to your prescriptions during this visit.

## 2024-12-21 NOTE — DISCHARGE INSTRUCTIONS
Keep dressing in place for the next 24 hours.    After 24 hours you can clean with warm soap and water.    Keep a Band-Aid on if you are out and about.    Follow-up primary care as needed    Return to the ER with any new or worsening symptoms.

## 2024-12-31 ENCOUNTER — ANTICOAGULATION VISIT (OUTPATIENT)
Dept: CARDIOLOGY | Facility: CLINIC | Age: 89
End: 2024-12-31
Payer: MEDICARE

## 2024-12-31 DIAGNOSIS — I48.0 PAROXYSMAL ATRIAL FIBRILLATION: Primary | ICD-10-CM

## 2024-12-31 LAB — INR PPP: 2 (ref 0.9–1.1)

## 2024-12-31 PROCEDURE — 36416 COLLJ CAPILLARY BLOOD SPEC: CPT | Performed by: INTERNAL MEDICINE

## 2024-12-31 PROCEDURE — 85610 PROTHROMBIN TIME: CPT | Performed by: INTERNAL MEDICINE

## 2024-12-31 PROCEDURE — 93793 ANTICOAG MGMT PT WARFARIN: CPT | Performed by: INTERNAL MEDICINE

## 2025-01-07 NOTE — PROGRESS NOTES
Patient seen today for wound check s/p [place] dual chamber PPM generator change.  Left subclavian incision is well approximated with staples and without erythema or drainage.  There is no hematoma.  Staples will be discontinued today.  Device interrogation will be performed.      RTC in one month.

## 2025-01-13 ENCOUNTER — LAB (OUTPATIENT)
Dept: LAB | Facility: HOSPITAL | Age: OVER 89
End: 2025-01-13
Payer: MEDICARE

## 2025-01-13 DIAGNOSIS — I49.5 SICK SINUS SYNDROME: ICD-10-CM

## 2025-01-13 DIAGNOSIS — Z95.0 PRESENCE OF CARDIAC PACEMAKER: ICD-10-CM

## 2025-01-13 DIAGNOSIS — Z45.010 PACEMAKER BATTERY DEPLETION: ICD-10-CM

## 2025-01-13 LAB
ALBUMIN SERPL-MCNC: 3.5 G/DL (ref 3.5–5.2)
ALBUMIN/GLOB SERPL: 1 G/DL
ALP SERPL-CCNC: 77 U/L (ref 39–117)
ALT SERPL W P-5'-P-CCNC: 11 U/L (ref 1–41)
ANION GAP SERPL CALCULATED.3IONS-SCNC: 12.7 MMOL/L (ref 5–15)
AST SERPL-CCNC: 20 U/L (ref 1–40)
BASOPHILS # BLD AUTO: 0.03 10*3/MM3 (ref 0–0.2)
BASOPHILS NFR BLD AUTO: 0.4 % (ref 0–1.5)
BILIRUB SERPL-MCNC: 0.3 MG/DL (ref 0–1.2)
BUN SERPL-MCNC: 22 MG/DL (ref 8–23)
BUN/CREAT SERPL: 15.2 (ref 7–25)
CALCIUM SPEC-SCNC: 9.4 MG/DL (ref 8.6–10.5)
CHLORIDE SERPL-SCNC: 103 MMOL/L (ref 98–107)
CO2 SERPL-SCNC: 25.3 MMOL/L (ref 22–29)
CREAT SERPL-MCNC: 1.45 MG/DL (ref 0.76–1.27)
DEPRECATED RDW RBC AUTO: 45.7 FL (ref 37–54)
EGFRCR SERPLBLD CKD-EPI 2021: 46.1 ML/MIN/1.73
EOSINOPHIL # BLD AUTO: 0.21 10*3/MM3 (ref 0–0.4)
EOSINOPHIL NFR BLD AUTO: 3 % (ref 0.3–6.2)
ERYTHROCYTE [DISTWIDTH] IN BLOOD BY AUTOMATED COUNT: 12.4 % (ref 12.3–15.4)
GLOBULIN UR ELPH-MCNC: 3.6 GM/DL
GLUCOSE SERPL-MCNC: 121 MG/DL (ref 65–99)
HCT VFR BLD AUTO: 32.4 % (ref 37.5–51)
HGB BLD-MCNC: 10.9 G/DL (ref 13–17.7)
IMM GRANULOCYTES # BLD AUTO: 0.03 10*3/MM3 (ref 0–0.05)
IMM GRANULOCYTES NFR BLD AUTO: 0.4 % (ref 0–0.5)
INR PPP: 2.35 (ref 2–3)
LYMPHOCYTES # BLD AUTO: 1.32 10*3/MM3 (ref 0.7–3.1)
LYMPHOCYTES NFR BLD AUTO: 19.1 % (ref 19.6–45.3)
MAGNESIUM SERPL-MCNC: 2.2 MG/DL (ref 1.6–2.4)
MCH RBC QN AUTO: 34.4 PG (ref 26.6–33)
MCHC RBC AUTO-ENTMCNC: 33.6 G/DL (ref 31.5–35.7)
MCV RBC AUTO: 102.2 FL (ref 79–97)
MONOCYTES # BLD AUTO: 0.77 10*3/MM3 (ref 0.1–0.9)
MONOCYTES NFR BLD AUTO: 11.2 % (ref 5–12)
NEUTROPHILS NFR BLD AUTO: 4.54 10*3/MM3 (ref 1.7–7)
NEUTROPHILS NFR BLD AUTO: 65.9 % (ref 42.7–76)
NRBC BLD AUTO-RTO: 0 /100 WBC (ref 0–0.2)
PLATELET # BLD AUTO: 289 10*3/MM3 (ref 140–450)
PMV BLD AUTO: 9.7 FL (ref 6–12)
POTASSIUM SERPL-SCNC: 5 MMOL/L (ref 3.5–5.2)
PROT SERPL-MCNC: 7.1 G/DL (ref 6–8.5)
PROTHROMBIN TIME: 25.8 SECONDS (ref 19.4–28.5)
RBC # BLD AUTO: 3.17 10*6/MM3 (ref 4.14–5.8)
SODIUM SERPL-SCNC: 141 MMOL/L (ref 136–145)
WBC NRBC COR # BLD AUTO: 6.9 10*3/MM3 (ref 3.4–10.8)

## 2025-01-13 PROCEDURE — 85610 PROTHROMBIN TIME: CPT

## 2025-01-13 PROCEDURE — 85025 COMPLETE CBC W/AUTO DIFF WBC: CPT

## 2025-01-13 PROCEDURE — 36415 COLL VENOUS BLD VENIPUNCTURE: CPT

## 2025-01-13 PROCEDURE — 80053 COMPREHEN METABOLIC PANEL: CPT

## 2025-01-13 PROCEDURE — 83735 ASSAY OF MAGNESIUM: CPT

## 2025-01-14 ENCOUNTER — ANESTHESIA EVENT (OUTPATIENT)
Dept: CARDIOLOGY | Facility: HOSPITAL | Age: OVER 89
End: 2025-01-14
Payer: MEDICARE

## 2025-01-14 RX ORDER — LANOLIN ALCOHOL/MO/W.PET/CERES
1000 CREAM (GRAM) TOPICAL DAILY
COMMUNITY

## 2025-01-14 RX ORDER — CALCIUM POLYCARBOPHIL 625 MG
1250 TABLET ORAL DAILY
COMMUNITY

## 2025-01-14 RX ORDER — GUAIFENESIN 400 MG/1
400 TABLET ORAL DAILY
COMMUNITY

## 2025-01-14 RX ORDER — ROSUVASTATIN CALCIUM 10 MG/1
10 TABLET, COATED ORAL NIGHTLY
COMMUNITY

## 2025-01-14 RX ORDER — WARFARIN SODIUM 5 MG/1
5 TABLET ORAL TAKE AS DIRECTED
Status: ON HOLD | COMMUNITY
End: 2025-01-15

## 2025-01-14 RX ORDER — WARFARIN SODIUM 5 MG/1
2.5 TABLET ORAL WEEKLY
Status: ON HOLD | COMMUNITY
End: 2025-01-15

## 2025-01-15 ENCOUNTER — HOSPITAL ENCOUNTER (OUTPATIENT)
Facility: HOSPITAL | Age: OVER 89
Setting detail: HOSPITAL OUTPATIENT SURGERY
Discharge: HOME | End: 2025-01-15
Attending: INTERNAL MEDICINE | Admitting: INTERNAL MEDICINE
Payer: MEDICARE

## 2025-01-15 ENCOUNTER — ANESTHESIA (OUTPATIENT)
Dept: CARDIOLOGY | Facility: HOSPITAL | Age: OVER 89
End: 2025-01-15
Payer: MEDICARE

## 2025-01-15 VITALS
HEIGHT: 72 IN | SYSTOLIC BLOOD PRESSURE: 128 MMHG | OXYGEN SATURATION: 100 % | HEART RATE: 68 BPM | TEMPERATURE: 97.9 F | WEIGHT: 134.92 LBS | DIASTOLIC BLOOD PRESSURE: 57 MMHG | BODY MASS INDEX: 18.27 KG/M2 | RESPIRATION RATE: 20 BRPM

## 2025-01-15 DIAGNOSIS — Z45.010 PACEMAKER BATTERY DEPLETION: ICD-10-CM

## 2025-01-15 DIAGNOSIS — Z95.0 PRESENCE OF CARDIAC PACEMAKER: ICD-10-CM

## 2025-01-15 DIAGNOSIS — I49.5 SICK SINUS SYNDROME: ICD-10-CM

## 2025-01-15 PROCEDURE — 25810000003 SODIUM CHLORIDE 0.9 % SOLUTION: Performed by: NURSE ANESTHETIST, CERTIFIED REGISTERED

## 2025-01-15 PROCEDURE — 25810000003 SODIUM CHLORIDE 0.9 % SOLUTION 1,000 ML FLEX CONT: Performed by: INTERNAL MEDICINE

## 2025-01-15 PROCEDURE — 25010000002 PROPOFOL 1000 MG/100ML EMULSION: Performed by: NURSE ANESTHETIST, CERTIFIED REGISTERED

## 2025-01-15 PROCEDURE — 33228 REMV&REPLC PM GEN DUAL LEAD: CPT | Performed by: INTERNAL MEDICINE

## 2025-01-15 PROCEDURE — 25010000002 LIDOCAINE-EPINEPHRINE 2 %-1:100000 SOLUTION: Performed by: INTERNAL MEDICINE

## 2025-01-15 PROCEDURE — 25010000002 CEFAZOLIN PER 500 MG: Performed by: INTERNAL MEDICINE

## 2025-01-15 PROCEDURE — 25010000002 LIDOCAINE PF 2% 2 % SOLUTION: Performed by: NURSE ANESTHETIST, CERTIFIED REGISTERED

## 2025-01-15 PROCEDURE — 25010000002 FENTANYL CITRATE (PF) 100 MCG/2ML SOLUTION: Performed by: NURSE ANESTHETIST, CERTIFIED REGISTERED

## 2025-01-15 PROCEDURE — C1785 PMKR, DUAL, RATE-RESP: HCPCS | Performed by: INTERNAL MEDICINE

## 2025-01-15 DEVICE — PACEMAKER
Type: IMPLANTABLE DEVICE | Site: CHEST | Status: FUNCTIONAL
Brand: ACCOLADE™ MRI DR

## 2025-01-15 DEVICE — SEAL HEMO SURG ARISTA/AH ABS/PWDR 1GM: Type: IMPLANTABLE DEVICE | Site: CHEST | Status: FUNCTIONAL

## 2025-01-15 RX ORDER — ACETAMINOPHEN 160 MG/5ML
650 SOLUTION ORAL EVERY 8 HOURS
Status: DISCONTINUED | OUTPATIENT
Start: 2025-01-15 | End: 2025-01-15 | Stop reason: HOSPADM

## 2025-01-15 RX ORDER — SODIUM CHLORIDE 0.9 % (FLUSH) 0.9 %
3 SYRINGE (ML) INJECTION EVERY 12 HOURS SCHEDULED
Status: DISCONTINUED | OUTPATIENT
Start: 2025-01-15 | End: 2025-01-15 | Stop reason: HOSPADM

## 2025-01-15 RX ORDER — LIDOCAINE HYDROCHLORIDE AND EPINEPHRINE BITARTRATE 20; .01 MG/ML; MG/ML
INJECTION, SOLUTION SUBCUTANEOUS
Status: DISCONTINUED | OUTPATIENT
Start: 2025-01-15 | End: 2025-01-15 | Stop reason: HOSPADM

## 2025-01-15 RX ORDER — WARFARIN SODIUM 5 MG/1
2.5 TABLET ORAL WEEKLY
Qty: 2 TABLET | Refills: 0 | Status: SHIPPED | OUTPATIENT
Start: 2025-01-24

## 2025-01-15 RX ORDER — ONDANSETRON 2 MG/ML
4 INJECTION INTRAMUSCULAR; INTRAVENOUS EVERY 6 HOURS PRN
Status: DISCONTINUED | OUTPATIENT
Start: 2025-01-15 | End: 2025-01-15 | Stop reason: HOSPADM

## 2025-01-15 RX ORDER — WARFARIN SODIUM 5 MG/1
5 TABLET ORAL TAKE AS DIRECTED
Qty: 12 TABLET | Refills: 0 | Status: SHIPPED | OUTPATIENT
Start: 2025-01-19

## 2025-01-15 RX ORDER — ONDANSETRON 2 MG/ML
4 INJECTION INTRAMUSCULAR; INTRAVENOUS ONCE AS NEEDED
Status: DISCONTINUED | OUTPATIENT
Start: 2025-01-15 | End: 2025-01-15 | Stop reason: HOSPADM

## 2025-01-15 RX ORDER — LABETALOL HYDROCHLORIDE 5 MG/ML
5 INJECTION, SOLUTION INTRAVENOUS
Status: DISCONTINUED | OUTPATIENT
Start: 2025-01-15 | End: 2025-01-15 | Stop reason: HOSPADM

## 2025-01-15 RX ORDER — IPRATROPIUM BROMIDE AND ALBUTEROL SULFATE 2.5; .5 MG/3ML; MG/3ML
3 SOLUTION RESPIRATORY (INHALATION) ONCE AS NEEDED
Status: DISCONTINUED | OUTPATIENT
Start: 2025-01-15 | End: 2025-01-15 | Stop reason: HOSPADM

## 2025-01-15 RX ORDER — LIDOCAINE HYDROCHLORIDE 20 MG/ML
INJECTION, SOLUTION EPIDURAL; INFILTRATION; INTRACAUDAL; PERINEURAL AS NEEDED
Status: DISCONTINUED | OUTPATIENT
Start: 2025-01-15 | End: 2025-01-15 | Stop reason: SURG

## 2025-01-15 RX ORDER — FENTANYL CITRATE 50 UG/ML
INJECTION, SOLUTION INTRAMUSCULAR; INTRAVENOUS AS NEEDED
Status: DISCONTINUED | OUTPATIENT
Start: 2025-01-15 | End: 2025-01-15 | Stop reason: SURG

## 2025-01-15 RX ORDER — NALOXONE HCL 0.4 MG/ML
0.4 VIAL (ML) INJECTION AS NEEDED
Status: DISCONTINUED | OUTPATIENT
Start: 2025-01-15 | End: 2025-01-15 | Stop reason: HOSPADM

## 2025-01-15 RX ORDER — ACETAMINOPHEN 325 MG/1
650 TABLET ORAL EVERY 8 HOURS
Status: DISCONTINUED | OUTPATIENT
Start: 2025-01-15 | End: 2025-01-15 | Stop reason: HOSPADM

## 2025-01-15 RX ORDER — SODIUM CHLORIDE 0.9 % (FLUSH) 0.9 %
3-10 SYRINGE (ML) INJECTION AS NEEDED
Status: DISCONTINUED | OUTPATIENT
Start: 2025-01-15 | End: 2025-01-15 | Stop reason: HOSPADM

## 2025-01-15 RX ORDER — SODIUM CHLORIDE 9 MG/ML
40 INJECTION, SOLUTION INTRAVENOUS AS NEEDED
Status: DISCONTINUED | OUTPATIENT
Start: 2025-01-15 | End: 2025-01-15 | Stop reason: HOSPADM

## 2025-01-15 RX ORDER — ACETAMINOPHEN 650 MG/1
650 SUPPOSITORY RECTAL EVERY 8 HOURS
Status: DISCONTINUED | OUTPATIENT
Start: 2025-01-15 | End: 2025-01-15 | Stop reason: HOSPADM

## 2025-01-15 RX ORDER — HYDROMORPHONE HYDROCHLORIDE 1 MG/ML
0.25 INJECTION, SOLUTION INTRAMUSCULAR; INTRAVENOUS; SUBCUTANEOUS
Status: DISCONTINUED | OUTPATIENT
Start: 2025-01-15 | End: 2025-01-15 | Stop reason: HOSPADM

## 2025-01-15 RX ORDER — HYDRALAZINE HYDROCHLORIDE 20 MG/ML
5 INJECTION INTRAMUSCULAR; INTRAVENOUS
Status: DISCONTINUED | OUTPATIENT
Start: 2025-01-15 | End: 2025-01-15 | Stop reason: HOSPADM

## 2025-01-15 RX ORDER — SODIUM CHLORIDE 9 MG/ML
INJECTION, SOLUTION INTRAVENOUS CONTINUOUS PRN
Status: DISCONTINUED | OUTPATIENT
Start: 2025-01-15 | End: 2025-01-15 | Stop reason: SURG

## 2025-01-15 RX ORDER — PHENYLEPHRINE HCL IN 0.9% NACL 1 MG/10 ML
SYRINGE (ML) INTRAVENOUS AS NEEDED
Status: DISCONTINUED | OUTPATIENT
Start: 2025-01-15 | End: 2025-01-15 | Stop reason: SURG

## 2025-01-15 RX ORDER — DIPHENHYDRAMINE HYDROCHLORIDE 50 MG/ML
12.5 INJECTION INTRAMUSCULAR; INTRAVENOUS
Status: DISCONTINUED | OUTPATIENT
Start: 2025-01-15 | End: 2025-01-15 | Stop reason: HOSPADM

## 2025-01-15 RX ORDER — PROPOFOL 10 MG/ML
INJECTION, EMULSION INTRAVENOUS AS NEEDED
Status: DISCONTINUED | OUTPATIENT
Start: 2025-01-15 | End: 2025-01-15 | Stop reason: SURG

## 2025-01-15 RX ADMIN — Medication 100 MCG: at 15:21

## 2025-01-15 RX ADMIN — Medication 150 MCG: at 15:27

## 2025-01-15 RX ADMIN — SODIUM CHLORIDE 2000 MG: 900 INJECTION INTRAVENOUS at 14:36

## 2025-01-15 RX ADMIN — PROPOFOL INJECTABLE EMULSION 100 MCG/KG/MIN: 10 INJECTION, EMULSION INTRAVENOUS at 14:48

## 2025-01-15 RX ADMIN — FENTANYL CITRATE 25 MCG: 50 INJECTION, SOLUTION INTRAMUSCULAR; INTRAVENOUS at 14:59

## 2025-01-15 RX ADMIN — Medication 100 MCG: at 15:39

## 2025-01-15 RX ADMIN — PROPOFOL INJECTABLE EMULSION 50 MG: 10 INJECTION, EMULSION INTRAVENOUS at 14:47

## 2025-01-15 RX ADMIN — SODIUM CHLORIDE: 9 INJECTION, SOLUTION INTRAVENOUS at 14:35

## 2025-01-15 RX ADMIN — SODIUM CHLORIDE 40 ML: 9 INJECTION, SOLUTION INTRAVENOUS at 14:36

## 2025-01-15 RX ADMIN — Medication 100 MCG: at 15:06

## 2025-01-15 RX ADMIN — Medication 100 MCG: at 15:34

## 2025-01-15 RX ADMIN — Medication 100 MCG: at 15:15

## 2025-01-15 RX ADMIN — LIDOCAINE HYDROCHLORIDE 80 MG: 20 SOLUTION INTRAVENOUS at 14:47

## 2025-01-15 NOTE — ANESTHESIA PREPROCEDURE EVALUATION
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  He does have history of paroxysmal atrial fibrillation coronary disease with prior history of bypass surgery and PCI and had AF ablation in 2018.  GARIMELLA      Neuro/Psych  GI/Hepatic/Renal/Endo    (+) GERD    Musculoskeletal     Abdominal  - normal exam    Bowel sounds: normal.   Substance History      OB/GYN          Other        ROS/Med Hx Other: THR 2021 MAC4 GRI  TTE 2021 EF 45  INR 2.35 1/13  COUMADIN LAST DOSE SUNDAY. PLAVIX TODAY. CONFIRMING WITH DR GARCIA                Anesthesia Plan    ASA 3     MAC   total IV anesthesia  intravenous induction     Anesthetic plan, risks, benefits, and alternatives have been provided, discussed and informed consent has been obtained with: patient.  Pre-procedure education provided  Plan discussed with CRNA.    CODE STATUS:

## 2025-01-15 NOTE — ANESTHESIA POSTPROCEDURE EVALUATION
Patient: Junior MONI Robles    Procedure Summary       Date: 01/15/25 Room / Location: [facility] CATH LAB 3 /  [facility] CATH INVASIVE LOCATION    Anesthesia Start: 1441 Anesthesia Stop: 1550    Procedure: PPM generator change - dual [place] (Left: Chest) Diagnosis:       Pacemaker battery depletion      Sick sinus syndrome      Presence of cardiac pacemaker      (Pacemaker battery depletion)    Providers: Froilan Luque MD Provider: Levon Goldstein CRNA    Anesthesia Type: MAC ASA Status: 3            Anesthesia Type: MAC    Vitals  Vitals Value Taken Time   /61 01/15/25 1552   Temp     Pulse 75 01/15/25 1626   Resp     SpO2 100 % 01/15/25 1626   Vitals shown include unfiled device data.        Post Anesthesia Care and Evaluation    Patient location during evaluation: PACU  Patient participation: complete - patient participated  Level of consciousness: awake  Pain scale: See nurse's notes for pain score.  Pain management: adequate    Airway patency: patent  Anesthetic complications: No anesthetic complications  PONV Status: none  Cardiovascular status: acceptable  Respiratory status: acceptable and spontaneous ventilation  Hydration status: acceptable    Comments: Patient seen and examined postoperatively; vital signs stable; SpO2 greater than or equal to 90%; cardiopulmonary status stable; nausea/vomiting adequately controlled; pain adequately controlled; no apparent anesthesia complications; patient discharged from anesthesia care when discharge criteria were met

## 2025-01-15 NOTE — H&P
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  He does have history of paroxysmal atrial fibrillation coronary disease with prior history of bypass surgery and PCI and had AF ablation in 2018.        Past Medical History:     Reviewed history from 01/26/2018 and no changes required:        Coronary artery disease: S/P CABG and PCI         Hyperlipidemia        Myocardial infarction, anterior        Hypertension        Gastroesophageal reflux disease        Dubose's esophagus        Diverticulitis        Atrial Fibrillation: new onset 12-4-15        SSS, Toby, Tachy : Pacemaker implantation / [employer]         Atrial Fibrillation, paroxysmal         pneumonia        AVNRT ablation--1/2018     Past Surgical History:     Reviewed history from 04/19/2018 and no changes required:        CABG x 4, 8-15-01        PCI/stent, LCX,  2-25-07        Hernia repair        Left rotator cuff repair        9 inches of colon removed        Colonoscopy        Cardiac Cath: 2001:2003;  2007: 2010:         [facility] 12-15-15 / unsuccessful attempt PCI of obtuse marginal branch of circumflex. Represents failed 2nd attempt of PCI of this vessel.         Herniorrhaphy Left Side        [facility] : 8-5-16- [employer] Pacemaker Implantation        SVT ablation 1/2018 [facility]        Left ATHR 4/4/18 Dr. Smith           Physical Exam     General:      well developed, well nourished, in no acute  distress.    Head:      normocephalic and atraumatic.    Eyes:      PERRL/EOM intact, conjunctivae and sclerae clear without nystagmus.    Neck:      no  thyromegaly, trachea central with normal respiratory effort  Lungs:      clear bilaterally to auscultation.    Heart:       regular rate and rhythm, S1, S2 without murmurs, rubs, or gallops  Skin:      intact without lesions or rashes.    Psych:      alert and cooperative; normal mood and affect; normal attention span and concentration.                      Assessment plan     Pacemaker interrogation revealed battery depletion patient came for pacemaker generator change.    Dual-chamber pacemaker in situ with sick sinus syndrome  Coronary artery disease stable without angina on Plavix  Hyperlipidemia on rosuvastatin  Hypertension controlled amlodipine  Anticoagulation with warfarin  Medications reviewed and follow-up appointments made      Electronically signed by Froilan Luque MD, 01/15/25, 2:22 PM EST.

## 2025-01-21 ENCOUNTER — TELEPHONE (OUTPATIENT)
Dept: CARDIOLOGY | Facility: CLINIC | Age: OVER 89
End: 2025-01-21

## 2025-01-21 ENCOUNTER — ANTICOAGULATION VISIT (OUTPATIENT)
Dept: CARDIOLOGY | Facility: CLINIC | Age: OVER 89
End: 2025-01-21
Payer: MEDICARE

## 2025-01-21 DIAGNOSIS — I48.0 PAROXYSMAL ATRIAL FIBRILLATION: Primary | ICD-10-CM

## 2025-01-21 LAB — INR PPP: 1.1 (ref 0.9–1.1)

## 2025-01-21 PROCEDURE — 93793 ANTICOAG MGMT PT WARFARIN: CPT | Performed by: INTERNAL MEDICINE

## 2025-01-21 PROCEDURE — 36416 COLLJ CAPILLARY BLOOD SPEC: CPT | Performed by: INTERNAL MEDICINE

## 2025-01-21 PROCEDURE — 85610 PROTHROMBIN TIME: CPT | Performed by: INTERNAL MEDICINE

## 2025-01-21 NOTE — TELEPHONE ENCOUNTER
Caller: Junior MONI Robles    Relationship to patient: Self    Best call back number: [phone]     Patient is needing: PT HAS A QUESTION FOR BALAJI IN REGARDS TO INSTRUCTIONS FOR WARFIRIN MEDICATION. PT SAID HE HAS BEEN HAVING A HARD TIME REACHING BALAJI, PLS CALL AND ADVISE.

## 2025-01-22 ENCOUNTER — OFFICE VISIT (OUTPATIENT)
Dept: CARDIOLOGY | Facility: CLINIC | Age: OVER 89
End: 2025-01-22
Payer: MEDICARE

## 2025-01-22 DIAGNOSIS — Z45.010 PACEMAKER BATTERY DEPLETION: Primary | ICD-10-CM

## 2025-01-22 DIAGNOSIS — I49.5 SICK SINUS SYNDROME: ICD-10-CM

## 2025-01-22 DIAGNOSIS — Z95.0 PRESENCE OF CARDIAC PACEMAKER: ICD-10-CM

## 2025-01-28 ENCOUNTER — ANTICOAGULATION VISIT (OUTPATIENT)
Dept: CARDIOLOGY | Facility: CLINIC | Age: OVER 89
End: 2025-01-28
Payer: MEDICARE

## 2025-01-28 DIAGNOSIS — I48.0 PAROXYSMAL ATRIAL FIBRILLATION: Primary | ICD-10-CM

## 2025-01-28 LAB — INR PPP: 2 (ref 0.9–1.1)

## 2025-01-28 PROCEDURE — 93793 ANTICOAG MGMT PT WARFARIN: CPT | Performed by: INTERNAL MEDICINE

## 2025-01-28 PROCEDURE — 85610 PROTHROMBIN TIME: CPT | Performed by: INTERNAL MEDICINE

## 2025-01-28 PROCEDURE — 36416 COLLJ CAPILLARY BLOOD SPEC: CPT | Performed by: INTERNAL MEDICINE

## 2025-01-29 RX ORDER — CLOPIDOGREL BISULFATE 75 MG/1
75 TABLET ORAL DAILY
Qty: 90 TABLET | Refills: 3 | Status: SHIPPED | OUTPATIENT
Start: 2025-01-29

## 2025-01-29 NOTE — TELEPHONE ENCOUNTER
Caller: Junior MONI Robles    Relationship: Self    Best call back number: [phone]    Requested Prescriptions:   Requested Prescriptions     Pending Prescriptions Disp Refills    clopidogrel (PLAVIX) 75 MG tablet 90 tablet 3     Sig: Take 1 tablet by mouth Daily.        Pharmacy where request should be sent:      [facility] [id number] [place], [place] - [address] [place] [address] - [phone] [facility] [phone] [address] [place] [address]   Phone: [phone] Fax: [phone]       Last office visit with prescribing clinician: 11/15/2024   Last telemedicine visit with prescribing clinician: Visit date not found   Next office visit with prescribing clinician: 5/16/2025     Additional details provided by patient:     Does the patient have less than a 3 day supply:  [x] Yes  [] No    Would you like a call back once the refill request has been completed: [] Yes [x] No    If the office needs to give you a call back, can they leave a voicemail: [] Yes [x] No    Roselyn Gutierrez Rep   01/29/25 14:57 EST

## 2025-02-14 ENCOUNTER — ANTICOAGULATION VISIT (OUTPATIENT)
Dept: CARDIOLOGY | Facility: CLINIC | Age: OVER 89
End: 2025-02-14
Payer: MEDICARE

## 2025-02-14 DIAGNOSIS — I48.0 PAROXYSMAL ATRIAL FIBRILLATION: Primary | ICD-10-CM

## 2025-02-14 LAB — INR PPP: 2.6 (ref 0.9–1.1)

## 2025-02-14 PROCEDURE — 36416 COLLJ CAPILLARY BLOOD SPEC: CPT | Performed by: INTERNAL MEDICINE

## 2025-02-14 PROCEDURE — 93793 ANTICOAG MGMT PT WARFARIN: CPT | Performed by: INTERNAL MEDICINE

## 2025-02-14 PROCEDURE — 85610 PROTHROMBIN TIME: CPT | Performed by: INTERNAL MEDICINE

## 2025-02-27 ENCOUNTER — OFFICE VISIT (OUTPATIENT)
Dept: CARDIOLOGY | Facility: CLINIC | Age: OVER 89
End: 2025-02-27
Payer: MEDICARE

## 2025-02-27 DIAGNOSIS — Z95.0 PRESENCE OF CARDIAC PACEMAKER: ICD-10-CM

## 2025-02-27 DIAGNOSIS — I49.5 SICK SINUS SYNDROME: ICD-10-CM

## 2025-02-27 DIAGNOSIS — I48.0 PAROXYSMAL ATRIAL FIBRILLATION: Primary | ICD-10-CM

## 2025-02-27 NOTE — PROGRESS NOTES
Pacemaker site is clean and normal pacemaker function  Pacemaker interrogation attached to chart        Electronically signed by Froilan Luque MD, 02/27/25, 12:33 PM EST.

## 2025-03-14 ENCOUNTER — ANTICOAGULATION VISIT (OUTPATIENT)
Dept: CARDIOLOGY | Facility: CLINIC | Age: OVER 89
End: 2025-03-14
Payer: MEDICARE

## 2025-03-14 DIAGNOSIS — I48.0 PAROXYSMAL ATRIAL FIBRILLATION: Primary | ICD-10-CM

## 2025-03-14 LAB — INR PPP: 1.5 (ref 0.9–1.1)

## 2025-03-14 PROCEDURE — 36416 COLLJ CAPILLARY BLOOD SPEC: CPT | Performed by: INTERNAL MEDICINE

## 2025-03-14 PROCEDURE — 85610 PROTHROMBIN TIME: CPT | Performed by: INTERNAL MEDICINE

## 2025-03-14 PROCEDURE — 93793 ANTICOAG MGMT PT WARFARIN: CPT | Performed by: INTERNAL MEDICINE

## 2025-04-04 ENCOUNTER — ANTICOAGULATION VISIT (OUTPATIENT)
Dept: CARDIOLOGY | Facility: CLINIC | Age: OVER 89
End: 2025-04-04
Payer: MEDICARE

## 2025-04-04 DIAGNOSIS — I48.0 PAROXYSMAL ATRIAL FIBRILLATION: Primary | ICD-10-CM

## 2025-04-04 LAB — INR PPP: 2.2 (ref 0.9–1.1)

## 2025-04-04 PROCEDURE — 85610 PROTHROMBIN TIME: CPT | Performed by: INTERNAL MEDICINE

## 2025-04-04 PROCEDURE — 93793 ANTICOAG MGMT PT WARFARIN: CPT | Performed by: INTERNAL MEDICINE

## 2025-04-04 PROCEDURE — 36416 COLLJ CAPILLARY BLOOD SPEC: CPT | Performed by: INTERNAL MEDICINE

## 2025-05-02 ENCOUNTER — ANTICOAGULATION VISIT (OUTPATIENT)
Dept: CARDIOLOGY | Facility: CLINIC | Age: OVER 89
End: 2025-05-02
Payer: MEDICARE

## 2025-05-02 DIAGNOSIS — I48.0 PAROXYSMAL ATRIAL FIBRILLATION: Primary | ICD-10-CM

## 2025-05-02 LAB — INR PPP: 2.7 (ref 0.9–1.1)

## 2025-05-02 PROCEDURE — 85610 PROTHROMBIN TIME: CPT | Performed by: INTERNAL MEDICINE

## 2025-05-02 PROCEDURE — 36416 COLLJ CAPILLARY BLOOD SPEC: CPT | Performed by: INTERNAL MEDICINE

## 2025-05-02 PROCEDURE — 93793 ANTICOAG MGMT PT WARFARIN: CPT | Performed by: INTERNAL MEDICINE

## 2025-05-16 ENCOUNTER — OFFICE VISIT (OUTPATIENT)
Dept: CARDIOLOGY | Facility: CLINIC | Age: OVER 89
End: 2025-05-16
Payer: MEDICARE

## 2025-05-16 VITALS
WEIGHT: 135.2 LBS | BODY MASS INDEX: 18.31 KG/M2 | OXYGEN SATURATION: 100 % | HEIGHT: 72 IN | HEART RATE: 81 BPM | SYSTOLIC BLOOD PRESSURE: 138 MMHG | DIASTOLIC BLOOD PRESSURE: 64 MMHG

## 2025-05-16 DIAGNOSIS — I25.10 CORONARY ARTERY DISEASE INVOLVING NATIVE CORONARY ARTERY OF NATIVE HEART WITHOUT ANGINA PECTORIS: ICD-10-CM

## 2025-05-16 DIAGNOSIS — I49.5 SICK SINUS SYNDROME: Primary | ICD-10-CM

## 2025-05-16 DIAGNOSIS — I47.10 PAROXYSMAL SUPRAVENTRICULAR TACHYCARDIA: ICD-10-CM

## 2025-05-16 DIAGNOSIS — E78.2 MIXED HYPERLIPIDEMIA: ICD-10-CM

## 2025-05-16 DIAGNOSIS — Z95.0 PRESENCE OF CARDIAC PACEMAKER: ICD-10-CM

## 2025-05-16 DIAGNOSIS — I25.5 ISCHEMIC CARDIOMYOPATHY: ICD-10-CM

## 2025-05-16 DIAGNOSIS — I21.9 MYOCARDIAL INFARCTION, UNSPECIFIED MI TYPE, UNSPECIFIED ARTERY: ICD-10-CM

## 2025-05-16 NOTE — PROGRESS NOTES
Cardiology Office Visit      Encounter Date:  05/16/2025    Patient ID:   Junior MONI Robles is a 89 y.o. male.    Reason For Followup:  Chronic medical problems include Sick sinus syndrome, status post permanent pacemaker implant,  Paroxysmal atrial fibrillation, CAD, CABG, PCI--AV orc reentrant tachycardia status post ablation stiff previous CABG and PCI stenting/patient is here for follow-up      Brief Clinical History:  Dear Chace De La Rosa MD    I had the pleasure of seeing Junior MONI Robles today. As you are well aware, this is a 89 y.o. male with history of chronic ischemic heart disease status post previous CABG and subsequent PCI stenting.  He has had  history of atrial fibrillation  and  is on long-term anticoagulation therapy with warfarin.       Patient underwent AF ablation and  has been feeling much better without any significant symptoms-he does have easy bruisability and takes clopidogrel and warfarin.   Patient had prior AV roc reentrant tachycardia ablation.  He is intolerant to numerous medications including beta-blocker therapy.  He is currently on warfarin to prevent cardioembolic episodes. chads Vasc score--4.  He is status post permanent pacemaker implant       Interval History:  Patient denies any new complaints denies any symptoms of chest pain  dizziness or syncope  Denies any new complaints except for some shortness of breath with exertion and activity   No Dizziness or syncope  Compliant with medical therapy    Interpretation Summary    Abnormal myocardial perfusion study  Large severe intensity myocardial perfusion defect involving the anterior wall anteroseptal wall and anteroapical wall suggestive of prior myocardial infarction in this territory with small brigette-infarct ischemia  Impressions are consistent with an intermediate risk study.  Left ventricular ejection fraction is normal. (Calculated EF = 50%).  Clinical correlation is recommended    Interpretation Summary    The left  ventricular cavity is mildly dilated.  Left ventricular wall thickness is consistent with mild concentric hypertrophy.  Estimated left ventricular EF = 45% Left ventricular systolic function is mildly decreased.  The following left ventricular wall segments are hypokinetic: mid anterior, apical anterior, apical septal, mid inferoseptal, apex hypokinetic and mid anteroseptal.  Estimated right ventricular systolic pressure from tricuspid regurgitation is mildly elevated (35-45 mmHg).  Mild pulmonary hypertension is present.  Left ventricular diastolic function is consistent with (grade II w/high LAP) pseudonormalization.        Assessment & Plan    Impressions:  1- CAD,   Status post CABG.  Status post PCI stenting.  No anginal symptoms.      2- recurrent and  paroxysmal atrial fibrillation -- post ablation doing well very rare palpitations short-lived.    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-- post ablation with complete resolution of symptoms     4- dual-chamber pacemaker in situ with normal function      5-  hypertension  Under excellent control  Ischemic cardiomyopathy with LV ejection fraction of 45%  Abnormal stress test with prior myocardial infarction with no significant reversible ischemia      Recommendations:  Continue current medical therapy  Continued aggressive risk factor modification  Patient is currently being considered for possible hip surgery for arthritis  Patient is tolerating anticoagulation therapy without any GI bleeding issues  Patient denies any exertional chest pain does not see any reason to proceed with any further invasive work-up after discussion the results of the stress test and echocardiogram  Continue current conservative medical management  Patient is currently on statin anticoagulation therapy with Coumadin and Plavix  Denies any new cardiac symptoms  Medication refills  Continue current medical therapy with Coumadin patient is tolerating  "without any further problems and INR is therapeutic  Continue current medical therapy with Crestor 20 mg p.o. once a day Plavix 75 mg p.o. once a day amlodipine 2.5 mg p.o. once a day  Continued aggressive risk factor modification  Labs with primary care physician office  Labs and workup reviewed and discussed patient  Recent device interrogation findings reviewed and discussed patient  Present orders for combination of anticoagulation therapy and antiplatelet therapy reviewed and discussed patient  Thanks very much for allowing us to participate in the care of your patients  Follow-up in office 6 months      Vitals:  Vitals:    05/16/25 1032   BP: 138/64   BP Location: Left arm   Patient Position: Sitting   Cuff Size: Adult   Pulse: 81   SpO2: 100%   Weight: 61.3 kg (135 lb 3.2 oz)   Height: 182.9 cm (72\")       Physical Exam:    General: Alert, cooperative, no distress, appears stated age  Head:  Normocephalic, atraumatic, mucous membranes moist  Eyes:  Conjunctiva/corneas clear, EOM's intact     Neck:  Supple,  no adenopathy;      Lungs: Clear to auscultation bilaterally, no wheezes rhonchi rales are noted  Chest wall: No tenderness  Heart::  Regular rate and rhythm, S1 and S2 normal, no murmur, rub or gallop  Abdomen: Soft, non-tender, nondistended bowel sounds active  Extremities: No cyanosis, clubbing, or edema  Pulses: 2+ and symmetric all extremities  Skin:  No rashes or lesions  Neuro/psych: A&O x3. CN II through XII are grossly intact with appropriate affect              Lab Results   Component Value Date    GLUCOSE 121 (H) 01/13/2025    BUN 22 01/13/2025    CREATININE 1.45 (H) 01/13/2025    EGFR 46.1 (L) 01/13/2025    BCR 15.2 01/13/2025    K 5.0 01/13/2025    CO2 25.3 01/13/2025    CALCIUM 9.4 01/13/2025    ALBUMIN 3.5 01/13/2025    BILITOT 0.3 01/13/2025    AST 20 01/13/2025    ALT 11 01/13/2025     Results for orders placed during the hospital encounter of 05/26/21    Adult Transthoracic Echo Complete " "W/ Cont if Necessary Per Protocol    Interpretation Summary  · The left ventricular cavity is mildly dilated.  · Left ventricular wall thickness is consistent with mild concentric hypertrophy.  · Estimated left ventricular EF = 45% Left ventricular systolic function is mildly decreased.  · The following left ventricular wall segments are hypokinetic: mid anterior, apical anterior, apical septal, mid inferoseptal, apex hypokinetic and mid anteroseptal.  · Estimated right ventricular systolic pressure from tricuspid regurgitation is mildly elevated (35-45 mmHg).  · Mild pulmonary hypertension is present.  · Left ventricular diastolic function is consistent with (grade II w/high LAP) pseudonormalization.     No results found for this or any previous visit.     No results found for: \"CHOL\", \"CHLPL\", \"TRIG\", \"HDL\", \"LDL\", \"LDLDIRECT\"   Results for orders placed during the hospital encounter of 05/26/21    Stress Test With Myocardial Perfusion One Day    Interpretation Summary  · Abnormal myocardial perfusion study  · Large severe intensity myocardial perfusion defect involving the anterior wall anteroseptal wall and anteroapical wall suggestive of prior myocardial infarction in this territory with small brigette-infarct ischemia  · Impressions are consistent with an intermediate risk study.  · Left ventricular ejection fraction is normal. (Calculated EF = 50%).  · Clinical correlation is recommended   Results for orders placed in visit on 06/05/20    SCANNED - CARDIOLOGY           Objective:          Allergies:  Allergies   Allergen Reactions    Valsartan Other (See Comments)     Weakness, loss of appetite     Atorvastatin Calcium Myalgia    Codeine Other (See Comments)     Lightheaded     Atorvastatin Other (See Comments)    Montelukast Other (See Comments)    Promethazine Other (See Comments)       Medication Review:     Current Outpatient Medications:     amLODIPine (NORVASC) 2.5 MG tablet, Take 1 tablet by mouth Daily., " Disp: , Rfl:     cholecalciferol (Cholecalciferol) 25 MCG (1000 UT) tablet, Take 5 tablets by mouth Daily., Disp: , Rfl:     clopidogrel (PLAVIX) 75 MG tablet, Take 1 tablet by mouth Daily., Disp: 90 tablet, Rfl: 3    docusate sodium (COLACE) 100 MG capsule, Take 1 capsule by mouth Every Morning., Disp: , Rfl:     ferrous sulfate 325 (65 FE) MG tablet, Take 1 tablet by mouth Daily With Breakfast., Disp: , Rfl:     guaiFENesin (Mucus Relief) 400 MG tablet, Take 1 tablet by mouth Daily., Disp: , Rfl:     multivitamin (MULTI-DAY VITAMINS PO), Take 1 tablet by mouth Daily., Disp: , Rfl:     pantoprazole (PROTONIX) 40 MG EC tablet, Take 1 tablet by mouth Every Night., Disp: , Rfl:     polycarbophil (calcium polycarbophil) 625 MG tablet tablet, Take 2 tablets by mouth Daily., Disp: , Rfl:     Potassium 99 MG tablet, Take 99 mg by mouth Every Night., Disp: , Rfl:     rosuvastatin (CRESTOR) 10 MG tablet, Take 1 tablet by mouth Every Night., Disp: , Rfl:     vitamin B-12 (CYANOCOBALAMIN) 1000 MCG tablet, Take 1 tablet by mouth Daily., Disp: , Rfl:     vitamin C (ASCORBIC ACID) 250 MG tablet, Take 1 tablet by mouth 2 (Two) Times a Day., Disp: , Rfl:     warfarin (COUMADIN) 5 MG tablet, Take 0.5 tablets by mouth 1 (One) Time Per Week. Fridays only, Disp: 2 tablet, Rfl: 0    warfarin (COUMADIN) 5 MG tablet, Take 1 tablet by mouth Take As Directed. [place], Mo, Tu, We, Th, Sa (every day except Fridays). Take 1/2 (one-half) tablet by mouth on Friday., Disp: 12 tablet, Rfl: 0    Family History:  Family History   Problem Relation Age of Onset    Stomach cancer Mother     Heart disease Father         Past. Away       Past Medical History:  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    AVNRT Ablation    Dubose esophagus     CAD (coronary artery disease)     S/P CABG and PCI    Diverticulitis     Gastroesophageal reflux disease     Hyperlipidemia   "   Hypertension     Myocardial infarction     anterior    Paroxysmal atrial fibrillation     havent been in afib    Pneumonia     SSS (sick sinus syndrome)     SSS, Toby, tachy; pacemaker implantation/ [employer]       Past surgical History:  Past Surgical History:   Procedure Laterality Date    CARDIAC CATHETERIZATION      2001:2003; 2007:2010:[facility] 12-15-15/ unsuccessful attempt PCI of obtuse marginal branch of circumflex. represents failed 2nd attempt of PCI of this vessel.    COLON SURGERY      9 inches on colon removed    COLONOSCOPY      CORONARY ANGIOPLASTY      CORONARY ARTERY BYPASS GRAFT  08/15/2001    x4    CORONARY STENT PLACEMENT  02/25/2007    LCX     HERNIA REPAIR Right     HERNIA REPAIR Left     INSERT / REPLACE / REMOVE PACEMAKER  08/05/2016    [facility] [employer] pacemaker implantation    PACEMAKER REPLACEMENT Left 1/15/2025    Procedure: PPM generator change - dual [employer];  Surgeon: Froilan Luque MD;  Location: [facility] CATH INVASIVE LOCATION;  Service: Cardiovascular;  Laterality: Left;    PROSTATE SURGERY  03/31/2019    REZUM: prostate    ROTATOR CUFF REPAIR Left     TOTAL HIP ARTHROPLASTY Left 04/04/2018    [facility] Dr. Smith    TOTAL HIP ARTHROPLASTY Right 11/30/2021    Procedure: TOTAL HIP ARTHROPLASTY ANTERIOR WITH HANA TABLE;  Surgeon: Frantz Hickman MD;  Location: [facility] MAIN OR;  Service: Orthopedics;  Laterality: Right;    VENTRICULAR ABLATION SURGERY  01/2018    SVT ablation [facility]       Social History:  Social History     Socioeconomic History    Marital status:    Tobacco Use    Smoking status: Some Days     Types: Pipe    Smokeless tobacco: Never    Tobacco comments:     Used filtered pipe \"every once in a while\"   Vaping Use    Vaping status: Never Used   Substance and Sexual Activity    Alcohol use: No    Drug use: No    Sexual activity: Defer     Partners: Female       Review of Systems:  The following systems were reviewed as they relate to the cardiovascular system: " Constitutional, Eyes, ENT, Cardiovascular, Respiratory, Gastrointestinal, Integumentary, Neurological, Psychiatric, Hematologic, Endocrine, Musculoskeletal, and Genitourinary. The pertinent cardiovascular findings are reported above with all other cardiovascular points within those systems being negative.    Diagnostic Study Review:     Current Electrocardiogram:    ECG 12 Lead    Date/Time: 5/16/2025 11:18 AM  Performed by: Holly Spivey MD    Authorized by: Holly Spivey MD  Comparison: compared with previous ECG   Similar to previous ECG  Rhythm: sinus rhythm  Ectopy: unifocal PVCs  Rate: normal  BPM: 81  Conduction: conduction normal  QRS axis: normal  Other findings: left ventricular hypertrophy and left ventricular hypertrophy with strain    Clinical impression: abnormal EKG                NOTE: The following portions of the patient's history were reviewed and updated this visit as appropriate: allergies, current medications, past family history, past medical history, past social history, past surgical history and problem list.

## 2025-05-30 ENCOUNTER — ANTICOAGULATION VISIT (OUTPATIENT)
Dept: CARDIOLOGY | Facility: CLINIC | Age: OVER 89
End: 2025-05-30
Payer: MEDICARE

## 2025-05-30 DIAGNOSIS — I48.0 PAROXYSMAL ATRIAL FIBRILLATION: Primary | ICD-10-CM

## 2025-05-30 LAB — INR PPP: 2.7 (ref 0.9–1.1)

## 2025-05-30 PROCEDURE — 93793 ANTICOAG MGMT PT WARFARIN: CPT | Performed by: INTERNAL MEDICINE

## 2025-05-30 PROCEDURE — 85610 PROTHROMBIN TIME: CPT | Performed by: INTERNAL MEDICINE

## 2025-05-30 PROCEDURE — 36416 COLLJ CAPILLARY BLOOD SPEC: CPT | Performed by: INTERNAL MEDICINE

## 2025-06-27 ENCOUNTER — ANTICOAGULATION VISIT (OUTPATIENT)
Dept: CARDIOLOGY | Facility: CLINIC | Age: OVER 89
End: 2025-06-27
Payer: MEDICARE

## 2025-06-27 DIAGNOSIS — I48.0 PAROXYSMAL ATRIAL FIBRILLATION: Primary | ICD-10-CM

## 2025-06-27 LAB — INR PPP: 2.4 (ref 0.9–1.1)

## 2025-06-27 PROCEDURE — 85610 PROTHROMBIN TIME: CPT | Performed by: INTERNAL MEDICINE

## 2025-06-27 PROCEDURE — 93793 ANTICOAG MGMT PT WARFARIN: CPT | Performed by: INTERNAL MEDICINE

## 2025-06-27 PROCEDURE — 36416 COLLJ CAPILLARY BLOOD SPEC: CPT | Performed by: INTERNAL MEDICINE

## 2025-07-11 RX ORDER — WARFARIN SODIUM 5 MG/1
5 TABLET ORAL TAKE AS DIRECTED
Qty: 12 TABLET | Refills: 0 | Status: SHIPPED | OUTPATIENT
Start: 2025-07-11

## 2025-07-18 ENCOUNTER — INPATIENT HOSPITAL (AMBULATORY)
Dept: URBAN - METROPOLITAN AREA HOSPITAL 76 | Facility: HOSPITAL | Age: OVER 89
End: 2025-07-18
Payer: MEDICARE

## 2025-07-18 DIAGNOSIS — D64.9 ANEMIA, UNSPECIFIED: ICD-10-CM

## 2025-07-18 DIAGNOSIS — K22.6 GASTRO-ESOPHAGEAL LACERATION-HEMORRHAGE SYNDROME: ICD-10-CM

## 2025-07-18 DIAGNOSIS — D72.829 ELEVATED WHITE BLOOD CELL COUNT, UNSPECIFIED: ICD-10-CM

## 2025-07-18 DIAGNOSIS — R11.2 NAUSEA WITH VOMITING, UNSPECIFIED: ICD-10-CM

## 2025-07-18 PROCEDURE — 99232 SBSQ HOSP IP/OBS MODERATE 35: CPT

## 2025-07-20 ENCOUNTER — INPATIENT HOSPITAL (AMBULATORY)
Dept: URBAN - METROPOLITAN AREA HOSPITAL 76 | Facility: HOSPITAL | Age: OVER 89
End: 2025-07-20
Payer: MEDICARE

## 2025-07-20 DIAGNOSIS — D64.9 ANEMIA, UNSPECIFIED: ICD-10-CM

## 2025-07-20 DIAGNOSIS — K59.00 CONSTIPATION, UNSPECIFIED: ICD-10-CM

## 2025-07-20 DIAGNOSIS — R11.0 NAUSEA: ICD-10-CM

## 2025-07-20 PROCEDURE — 99232 SBSQ HOSP IP/OBS MODERATE 35: CPT | Performed by: INTERNAL MEDICINE

## 2025-07-21 ENCOUNTER — INPATIENT HOSPITAL (AMBULATORY)
Dept: URBAN - METROPOLITAN AREA HOSPITAL 76 | Facility: HOSPITAL | Age: OVER 89
End: 2025-07-21
Payer: MEDICARE

## 2025-07-21 DIAGNOSIS — D64.9 ANEMIA, UNSPECIFIED: ICD-10-CM

## 2025-07-21 DIAGNOSIS — R10.9 UNSPECIFIED ABDOMINAL PAIN: ICD-10-CM

## 2025-07-21 DIAGNOSIS — K59.00 CONSTIPATION, UNSPECIFIED: ICD-10-CM

## 2025-07-21 PROCEDURE — 99232 SBSQ HOSP IP/OBS MODERATE 35: CPT

## 2025-07-22 ENCOUNTER — TELEPHONE (OUTPATIENT)
Dept: CARDIOLOGY | Facility: CLINIC | Age: OVER 89
End: 2025-07-22
Payer: MEDICARE

## 2025-07-22 ENCOUNTER — INPATIENT HOSPITAL (AMBULATORY)
Dept: URBAN - METROPOLITAN AREA HOSPITAL 76 | Facility: HOSPITAL | Age: OVER 89
End: 2025-07-22
Payer: MEDICARE

## 2025-07-22 DIAGNOSIS — K59.00 CONSTIPATION, UNSPECIFIED: ICD-10-CM

## 2025-07-22 DIAGNOSIS — R10.9 UNSPECIFIED ABDOMINAL PAIN: ICD-10-CM

## 2025-07-22 DIAGNOSIS — D64.9 ANEMIA, UNSPECIFIED: ICD-10-CM

## 2025-07-22 PROCEDURE — 99232 SBSQ HOSP IP/OBS MODERATE 35: CPT

## 2025-07-22 NOTE — TELEPHONE ENCOUNTER
Caller: Junior MONI Robles    Relationship: Self    Best call back number: [phone]     Which medication are you concerned about: METOPROLOL    Who prescribed you this medication: HOSPITALIST, [facility]    What are your concerns: PT WAS ADMITTED TO [facility] DUE TO LOSING BLOOD FROM A RIPPED LARYNX, HE WENT FOR LOW HEMOGLOBIN AND HIGH HR - PT HAS HAD DIFFERENT MEDICATIONS ADDED AND ONE THEY HAVE ADDED IS METOPROLOL AND HE IS ASKING WHAT DR TALAMANTES THINKS AS HE IS TAKING WARFARIN AND CLOPIDOGREL ALREADY AND STATES HE BELIEVES HES TAKEN METOPROLOL BEFORE AND IT DIDNT AGREE WITH HIM, PT IS MEANT TO BE DISCHARGED TODAY BUT IS WANTING TO KNOW WHAT HE SHOULD DO - PT STATES HE WOULD LIKE TO COME IN SOONER, HE CAN COME IN TODAY - PLEASE ADVISE

## 2025-07-24 LAB
MC_CV_MDC_IDC_RATE_1: 160
MC_CV_MDC_IDC_ZONE_ID: 1
MDC_IDC_MSMT_BATTERY_REMAINING_LONGEVITY: 108 MO
MDC_IDC_MSMT_BATTERY_REMAINING_PERCENTAGE: 100 %
MDC_IDC_MSMT_BATTERY_STATUS: NORMAL
MDC_IDC_MSMT_LEADCHNL_RA_DTM: NORMAL
MDC_IDC_MSMT_LEADCHNL_RA_IMPEDANCE_VALUE: 593
MDC_IDC_MSMT_LEADCHNL_RA_PACING_THRESHOLD_POLARITY: NORMAL
MDC_IDC_MSMT_LEADCHNL_RA_SENSING_INTR_AMPL: 3.8
MDC_IDC_MSMT_LEADCHNL_RV_DTM: NORMAL
MDC_IDC_MSMT_LEADCHNL_RV_IMPEDANCE_VALUE: 614
MDC_IDC_MSMT_LEADCHNL_RV_PACING_THRESHOLD_POLARITY: NORMAL
MDC_IDC_MSMT_LEADCHNL_RV_SENSING_INTR_AMPL: 13.2
MDC_IDC_PG_IMPLANT_DTM: NORMAL
MDC_IDC_PG_MFG: NORMAL
MDC_IDC_PG_MODEL: NORMAL
MDC_IDC_PG_SERIAL: NORMAL
MDC_IDC_PG_TYPE: NORMAL
MDC_IDC_SESS_DTM: NORMAL
MDC_IDC_SESS_TYPE: NORMAL
MDC_IDC_SET_BRADY_AT_MODE_SWITCH_RATE: 170
MDC_IDC_SET_BRADY_LOWRATE: 60
MDC_IDC_SET_BRADY_MAX_SENSOR_RATE: 130
MDC_IDC_SET_BRADY_MAX_TRACKING_RATE: 120
MDC_IDC_SET_BRADY_MODE: NORMAL
MDC_IDC_SET_BRADY_PAV_DELAY: 250
MDC_IDC_SET_BRADY_SAV_DELAY: 250
MDC_IDC_SET_LEADCHNL_RA_PACING_AMPLITUDE: 2.5
MDC_IDC_SET_LEADCHNL_RA_PACING_POLARITY: NORMAL
MDC_IDC_SET_LEADCHNL_RA_PACING_PULSEWIDTH: 0.4
MDC_IDC_SET_LEADCHNL_RA_SENSING_POLARITY: NORMAL
MDC_IDC_SET_LEADCHNL_RA_SENSING_SENSITIVITY: 0.25
MDC_IDC_SET_LEADCHNL_RV_PACING_AMPLITUDE: 2
MDC_IDC_SET_LEADCHNL_RV_PACING_POLARITY: NORMAL
MDC_IDC_SET_LEADCHNL_RV_PACING_PULSEWIDTH: 0.4
MDC_IDC_SET_LEADCHNL_RV_SENSING_POLARITY: NORMAL
MDC_IDC_SET_LEADCHNL_RV_SENSING_SENSITIVITY: 0.6
MDC_IDC_SET_ZONE_STATUS: NORMAL
MDC_IDC_SET_ZONE_TYPE: NORMAL
MDC_IDC_STAT_AT_BURDEN_PERCENT: 1
MDC_IDC_STAT_BRADY_RA_PERCENT_PACED: 22
MDC_IDC_STAT_BRADY_RV_PERCENT_PACED: 6

## 2025-07-25 ENCOUNTER — TELEPHONE (OUTPATIENT)
Dept: CARDIOLOGY | Facility: CLINIC | Age: OVER 89
End: 2025-07-25

## 2025-07-25 ENCOUNTER — ANTICOAGULATION VISIT (OUTPATIENT)
Dept: CARDIOLOGY | Facility: CLINIC | Age: OVER 89
End: 2025-07-25

## 2025-07-25 ENCOUNTER — OFFICE VISIT (OUTPATIENT)
Dept: CARDIOLOGY | Facility: CLINIC | Age: OVER 89
End: 2025-07-25
Payer: MEDICARE

## 2025-07-25 VITALS
RESPIRATION RATE: 16 BRPM | HEIGHT: 72 IN | HEART RATE: 86 BPM | WEIGHT: 139 LBS | BODY MASS INDEX: 18.83 KG/M2 | SYSTOLIC BLOOD PRESSURE: 120 MMHG | DIASTOLIC BLOOD PRESSURE: 68 MMHG

## 2025-07-25 DIAGNOSIS — I49.5 SICK SINUS SYNDROME: Primary | ICD-10-CM

## 2025-07-25 DIAGNOSIS — I48.0 PAROXYSMAL ATRIAL FIBRILLATION: Primary | ICD-10-CM

## 2025-07-25 DIAGNOSIS — I47.10 PAROXYSMAL SUPRAVENTRICULAR TACHYCARDIA: ICD-10-CM

## 2025-07-25 DIAGNOSIS — I21.9 MYOCARDIAL INFARCTION, UNSPECIFIED MI TYPE, UNSPECIFIED ARTERY: ICD-10-CM

## 2025-07-25 DIAGNOSIS — Z45.010 PACEMAKER BATTERY DEPLETION: ICD-10-CM

## 2025-07-25 DIAGNOSIS — Z95.0 PRESENCE OF CARDIAC PACEMAKER: ICD-10-CM

## 2025-07-25 DIAGNOSIS — I25.5 ISCHEMIC CARDIOMYOPATHY: ICD-10-CM

## 2025-07-25 DIAGNOSIS — E78.2 MIXED HYPERLIPIDEMIA: ICD-10-CM

## 2025-07-25 LAB — INR PPP: 1.5 (ref 0.9–1.1)

## 2025-07-25 PROCEDURE — 1159F MED LIST DOCD IN RCRD: CPT | Performed by: INTERNAL MEDICINE

## 2025-07-25 PROCEDURE — 99214 OFFICE O/P EST MOD 30 MIN: CPT | Performed by: INTERNAL MEDICINE

## 2025-07-25 PROCEDURE — 85610 PROTHROMBIN TIME: CPT | Performed by: INTERNAL MEDICINE

## 2025-07-25 PROCEDURE — 36416 COLLJ CAPILLARY BLOOD SPEC: CPT | Performed by: INTERNAL MEDICINE

## 2025-07-25 PROCEDURE — 1160F RVW MEDS BY RX/DR IN RCRD: CPT | Performed by: INTERNAL MEDICINE

## 2025-07-25 NOTE — TELEPHONE ENCOUNTER
Caller: Junior MONI Robles    Relationship: Self    Best call back number: [phone]    What is the best time to reach you: ANY    Who are you requesting to speak with (clinical staff, provider,  specific staff member): CLINICAL        What was the call regarding: PATIENT STATED THAT HE HAD HIS LABS DONE THIS MORNING AND HE WOULD LIKE FOR SOMEONE TO CALL HIM BACK WITH THOSE RESULTS. THANK YOU.    Is it okay if the provider responds through MyChart: CALL

## 2025-07-28 NOTE — PROGRESS NOTES
Cardiology Office Visit      Encounter Date:  2025    PATIENT IDENTIFICATION    Name: Junior MONI Robles  Age: 89 y.o. 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 As you are well aware, this is a 89 y.o. male with history of chronic ischemic heart disease status post previous CABG and subsequent PCI stenting.  He has had  history of atrial fibrillation  and  is on long-term anticoagulation therapy with warfarin.             Interval History:  Was recently hospitalized at [facility] for upper GI bleed was diagnosed with a Janay-Morris tear and also significant anemia requiring multiple transfusions  Medical records from the [facility] reviewed and discussed with patient  Denies any further GI bleeding  Denies any hematemesis or melena  Denies any dizziness or syncope  Denies any chest pain  Denies any further new cardiac symptoms          Assessment & Plan    Impressions:  1- CAD,   Status post CABG.  Status post PCI stenting.  No anginal symptoms.      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    4- dual-chamber pacemaker in situ with normal function      5-  hypertension  Under excellent control  Ischemic cardiomyopathy with LV ejection fraction of 45%  Abnormal stress test with prior myocardial infarction with no significant reversible ischemia    Recent upper GI bleeding that was diagnosed as  a Janay-Morris tear after the EGD and patient is back on anticoagulation and antiplatelet therapy and tolerating at this time    Recent upper GI bleed  Diagnosed with a Janay-Morris tear  Patient is cleared by GI to get back on the anticoagulation and antiplatelet therapy  Risk benefits and alternatives reviewed and discussed with patient  Plans to continue close monitoring of hemoglobin  Check CBC and chemistry today  If there is any further drop in hemoglobin okay to continue the anticoagulation therapy and antiplatelet therapy  Patient is back on Coumadin will check a PT/INR today and patient is back on Plavix  Avoid any anti-inflammatory medication  Continue Protonix as prescribed      Atrial arrhythmias and atrial fibrillation status post prior ablation therapy  Elevated Efe vascular score  Patient is currently on anticoagulation therapy with warfarin  Risk benefits and alternatives for long-term oral anticoagulation therapy and alternative therapies like a watchman implantation reviewed and discussed with patient  [id number] SCORE 4  No data recorded   Risk of bleeding at the combination of antiplatelet therapy and anticoagulation therapy reviewed and discussed with patient    Coronary artery disease with prior coronary artery bypass surgery currently stable denies any new cardiac symptoms  Current medications include Plavix 75 mg p.o. once a day patient is on amlodipine 2.5 mg p.o. once a day patient is on Crestor 10 mg p.o. once a day    Sick sinus syndrome status post dual-chamber pacemaker in situ    Medical records from recent hospitalization reviewed and discussed with patient and family  Continue Plavix and Coumadin as long as there is no further active GI bleeding  Patient is advised to consider alternatives for anticoagulation therapy like watchman evaluation  Check CBC and chemistry today  Follow-up closely with GI as scheduled        Follow-up in office 3 months                      Diagnoses  "and all orders for this visit:    1. Sick sinus syndrome (Primary)  -     CBC & Differential; Future  -     Basic Metabolic Panel; Future    2. Presence of cardiac pacemaker  -     CBC & Differential; Future  -     Basic Metabolic Panel; Future    3. Paroxysmal supraventricular tachycardia  -     CBC & Differential; Future  -     Basic Metabolic Panel; Future    4. Myocardial infarction, unspecified MI type, unspecified artery  Overview:  anterior    Orders:  -     CBC & Differential; Future  -     Basic Metabolic Panel; Future    5. Pacemaker battery depletion    6. Ischemic cardiomyopathy    7. Mixed hyperlipidemia          Objective:    Vitals:  Vitals:    07/25/25 0947   BP: 120/68   BP Location: Left arm   Patient Position: Sitting   Cuff Size: Adult   Pulse: 86   Resp: 16   Weight: 63 kg (139 lb)   Height: 182.9 cm (72\")     Body mass index is 18.85 kg/m².      Physical Exam:    General: Alert, cooperative, no distress, appears stated age  Head:  Normocephalic, atraumatic, mucous membranes moist  Eyes:  Conjunctiva/corneas clear, EOM's intact     Neck:  Supple,  no bruit    Lungs: Clear to auscultation bilaterally, no wheezes rhonchi rales are noted  Chest wall: No tenderness  Heart::  Regular rate and rhythm, S1 and S2 normal, no murmur, rub or gallop  Abdomen: Soft, non-tender, nondistended bowel sounds active  Extremities: No cyanosis, clubbing, or edema  Pulses: 2+ and symmetric all extremities  Skin:  No rashes or lesions  Neuro/psych: A&O x3. CN II through XII are grossly intact with appropriate affect      Allergies:  Allergies   Allergen Reactions    Valsartan Other (See Comments)     Weakness, loss of appetite     Atorvastatin Calcium Myalgia    Codeine Other (See Comments)     Lightheaded     Atorvastatin Other (See Comments)    Montelukast Other (See Comments)    Promethazine Other (See Comments)       Medication Review:     Current Outpatient Medications:     amLODIPine (NORVASC) 2.5 MG tablet, " Take 1 tablet by mouth Daily., Disp: , Rfl:     cholecalciferol (Cholecalciferol) 25 MCG (1000 UT) tablet, Take 5 tablets by mouth Daily., Disp: , Rfl:     clopidogrel (PLAVIX) 75 MG tablet, Take 1 tablet by mouth Daily., Disp: 90 tablet, Rfl: 3    docusate sodium (COLACE) 100 MG capsule, Take 1 capsule by mouth Every Morning., Disp: , Rfl:     ferrous sulfate 325 (65 FE) MG tablet, Take 1 tablet by mouth Daily With Breakfast., Disp: , Rfl:     guaiFENesin (Mucus Relief) 400 MG tablet, Take 1 tablet by mouth Daily., Disp: , Rfl:     multivitamin (MULTI-DAY VITAMINS PO), Take 1 tablet by mouth Daily., Disp: , Rfl:     pantoprazole (PROTONIX) 40 MG EC tablet, Take 1 tablet by mouth Every Night., Disp: , Rfl:     polycarbophil (calcium polycarbophil) 625 MG tablet tablet, Take 2 tablets by mouth Daily., Disp: , Rfl:     Potassium 99 MG tablet, Take 99 mg by mouth Every Night., Disp: , Rfl:     rosuvastatin (CRESTOR) 10 MG tablet, Take 1 tablet by mouth Every Night., Disp: , Rfl:     vitamin B-12 (CYANOCOBALAMIN) 1000 MCG tablet, Take 1 tablet by mouth Daily., Disp: , Rfl:     vitamin C (ASCORBIC ACID) 250 MG tablet, Take 1 tablet by mouth 2 (Two) Times a Day., Disp: , Rfl:     warfarin (COUMADIN) 5 MG tablet, Take 0.5 tablets by mouth 1 (One) Time Per Week. Fridays only, Disp: 2 tablet, Rfl: 0    warfarin (COUMADIN) 5 MG tablet, Take 1 tablet by mouth Take As Directed. [place], Mo, Tu, We, Th, Sa (every day except Fridays). Take 1/2 (one-half) tablet by mouth on Friday., Disp: 12 tablet, Rfl: 0    Family History:  Family History   Problem Relation Age of Onset    Stomach cancer Mother     Heart disease Father         Past. Away       Past Medical History:  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    AVNRT Ablation    Dubose esophagus     CAD (coronary artery disease)     S/P CABG and PCI    Diverticulitis     Gastroesophageal  "reflux disease     Hyperlipidemia     Hypertension     Myocardial infarction     anterior    Paroxysmal atrial fibrillation     havent been in afib    Pneumonia     SSS (sick sinus syndrome)     SSS, Toby, tachy; pacemaker implantation/ [employer]       Past Surgical History:  Past Surgical History:   Procedure Laterality Date    CARDIAC CATHETERIZATION      2001:2003; 2007:2010:[facility] 12-15-15/ unsuccessful attempt PCI of obtuse marginal branch of circumflex. represents failed 2nd attempt of PCI of this vessel.    COLON SURGERY      9 inches on colon removed    COLONOSCOPY      CORONARY ANGIOPLASTY      CORONARY ARTERY BYPASS GRAFT  08/15/2001    x4    CORONARY STENT PLACEMENT  02/25/2007    LCX     HERNIA REPAIR Right     HERNIA REPAIR Left     INSERT / REPLACE / REMOVE PACEMAKER  08/05/2016    [facility] [employer] pacemaker implantation    PACEMAKER REPLACEMENT Left 1/15/2025    Procedure: PPM generator change - dual [employer];  Surgeon: Froilan Luque MD;  Location: [facility] CATH INVASIVE LOCATION;  Service: Cardiovascular;  Laterality: Left;    PROSTATE SURGERY  03/31/2019    REZUM: prostate    ROTATOR CUFF REPAIR Left     TOTAL HIP ARTHROPLASTY Left 04/04/2018    ATHR Dr. Smith    TOTAL HIP ARTHROPLASTY Right 11/30/2021    Procedure: TOTAL HIP ARTHROPLASTY ANTERIOR WITH HANA TABLE;  Surgeon: Frantz Hickman MD;  Location: [facility] MAIN OR;  Service: Orthopedics;  Laterality: Right;    VENTRICULAR ABLATION SURGERY  01/2018    SVT ablation [facility]       Social History:  Social History     Socioeconomic History    Marital status:    Tobacco Use    Smoking status: Some Days     Types: Pipe    Smokeless tobacco: Never    Tobacco comments:     Used filtered pipe \"every once in a while\"   Vaping Use    Vaping status: Never Used   Substance and Sexual Activity    Alcohol use: No    Drug use: No    Sexual activity: Defer     Partners: Female       Review of Systems:  The following systems were reviewed as they " "relate to the cardiovascular system: Constitutional, Eyes, ENT, Cardiovascular, Respiratory, Gastrointestinal, Integumentary, Neurological, Psychiatric, Hematologic, Endocrine, Musculoskeletal, and Genitourinary. The pertinent cardiovascular findings are reported above with all other cardiovascular points within those systems being negative.    Diagnostic Study Review:     Current Electrocardiogram:  Procedures    Laboratory Data:  Lab Results   Component Value Date    GLUCOSE 121 (H) 01/13/2025    BUN 22 01/13/2025    CREATININE 1.45 (H) 01/13/2025    EGFRIFNONA 53 (L) 12/02/2021    EGFRIFAFRI >60 03/31/2017    BCR 15.2 01/13/2025    K 5.0 01/13/2025    CO2 25.3 01/13/2025    CALCIUM 9.4 01/13/2025    ALBUMIN 3.5 01/13/2025    AST 20 01/13/2025    ALT 11 01/13/2025     Lab Results   Component Value Date    GLUCOSE 121 (H) 01/13/2025    CALCIUM 9.4 01/13/2025     01/13/2025    K 5.0 01/13/2025    CO2 25.3 01/13/2025     01/13/2025    BUN 22 01/13/2025    CREATININE 1.45 (H) 01/13/2025    EGFRIFAFRI >60 03/31/2017    EGFRIFNONA 53 (L) 12/02/2021    BCR 15.2 01/13/2025    ANIONGAP 12.7 01/13/2025     Lab Results   Component Value Date    WBC 6.90 01/13/2025    HGB 10.9 (L) 01/13/2025    HCT 32.4 (L) 01/13/2025    .2 (H) 01/13/2025     01/13/2025     No results found for: \"CHOL\", \"CHLPL\", \"TRIG\", \"HDL\", \"LDL\", \"LDLDIRECT\"  Lab Results   Component Value Date    HGBA1C 5.5 11/30/2021     Lab Results   Component Value Date    INR 1.50 (A) 07/25/2025    INR 2.40 (A) 06/27/2025    INR 2.70 (A) 05/30/2025    PROTIME 25.8 01/13/2025    PROTIME 35.5 (H) 12/23/2021    PROTIME >90.0 (H) 12/21/2021       Most Recent Echo:  Results for orders placed during the hospital encounter of 05/26/21    Adult Transthoracic Echo Complete W/ Cont if Necessary Per Protocol 06/02/2021  5:28 PM    Interpretation Summary  · The left ventricular cavity is mildly dilated.  · Left ventricular wall thickness is consistent " with mild concentric hypertrophy.  · Estimated left ventricular EF = 45% Left ventricular systolic function is mildly decreased.  · The following left ventricular wall segments are hypokinetic: mid anterior, apical anterior, apical septal, mid inferoseptal, apex hypokinetic and mid anteroseptal.  · Estimated right ventricular systolic pressure from tricuspid regurgitation is mildly elevated (35-45 mmHg).  · Mild pulmonary hypertension is present.  · Left ventricular diastolic function is consistent with (grade II w/high LAP) pseudonormalization.       Most Recent Stress Test:  Results for orders placed during the hospital encounter of 05/26/21    Stress Test With Myocardial Perfusion One Day 06/02/2021  1:12 PM    Interpretation Summary  · Abnormal myocardial perfusion study  · Large severe intensity myocardial perfusion defect involving the anterior wall anteroseptal wall and anteroapical wall suggestive of prior myocardial infarction in this territory with small brigette-infarct ischemia  · Impressions are consistent with an intermediate risk study.  · Left ventricular ejection fraction is normal. (Calculated EF = 50%).  · Clinical correlation is recommended       Most Recent Cardiac Catheterization:   No results found for this or any previous visit.       NOTE: The following portions of the patient's note were reviewed, confirmed and/or updated this visit as appropriate: History of present illness/Interval history, physical examination, assessment & plan, allergies, current medications, past family history, past medical history, past social history, past surgical history and problem list.    Labs pertinent to today's visit on 07/25/2025 (including but not limited to CBC, CMP, and lipid profiles) were requested from the patient's primary care provider/hospital/clinical laboratory.  If the labs were available for the visit, they were reviewed with the patient.  If they were not available, when received, special  "interest will be made to the labs pertinent to this visit.  The patient's most recent \"in-house\" labs are noted below and have been reviewed.  Outside labs pertinent to this visit are scanned into the record and have been reviewed.    Discussions held today, 07/25/2025,regarding procedures included risk, benefits, and options including but not limited to: Death, MI, stroke, pain, bleeding, infection, and possible need for vascular/thoracic/cardiothoracic surgery.    Copied information within this note was reviewed and is current as of 07/25/2025.    Assessment and plan noted herein represents the current plan of care as of 07/25/2025.    Significant resources from our office and staff are inherent in engaging this patient in a continuous and active collaborative plan of care related to their chronic cardiovascular conditions outlined herein.  The management of these conditions requires the direction of our service with specialized clinical knowledge, skills, and experience.  This collaborative care includes but is not limited to patient education, expectations and responsibilities, shared decision making around therapeutic goals, and shared commitments to achieve those goals.   "

## 2025-07-30 NOTE — TELEPHONE ENCOUNTER
Caller: Junior MONI Robles    Relationship: Self    Best call back number: [phone]    What was the call regarding: PATIENT CALLED BACK CHECKING ON THIS PLEASE CALL AND ADVISE.       [Medium Joint Injection] : medium joint injection [Left] : of the left [Ankle Joint] : ankle joint [Pain] : pain [Alcohol] : alcohol [Ethyl Chloride sprayed topically] : ethyl chloride sprayed topically [___ cc    6mg] :  Betamethasone (Celestone) ~Vcc of 6mg [___ cc    1%] : Lidocaine ~Vcc of 1%  [___ cc    0.5%] : Bupivacaine (Marcaine) ~Vcc of 0.5%  [Risks, benefits, alternatives discussed / Verbal consent obtained] : the risks benefits, and alternatives have been discussed, and verbal consent was obtained [All ultrasound images have been permanently captured and stored accordingly in our picture archiving and communication system] : All ultrasound images have been permanently captured and stored accordingly in our picture archiving and communication system

## 2025-07-30 NOTE — TELEPHONE ENCOUNTER
I spoke to Lui.  He said that he got the labs drawn at [place]. I am in the process of getting results from [place].  Once I get results and Dr. Spivey has reviewed them I will call Lui.  He verbalized his understanding.  COLLETTE Shukla

## 2025-07-31 NOTE — TELEPHONE ENCOUNTER
I spoke with [facility]. They stated they only have the CBC, which they will be faxing.  They do not have the BMP.  I spoke with Lui letting him know that the BMP was not drawn. He states that he will discuss with his PCP at his office visit 8/8/25.  CBC will be out for Dr. Spivey's review once we receive them.  COLLETTE Shukla

## 2025-08-01 ENCOUNTER — ANTICOAGULATION VISIT (OUTPATIENT)
Dept: CARDIOLOGY | Facility: CLINIC | Age: OVER 89
End: 2025-08-01
Payer: MEDICARE

## 2025-08-01 DIAGNOSIS — I48.0 PAROXYSMAL ATRIAL FIBRILLATION: Primary | ICD-10-CM

## 2025-08-01 LAB — INR PPP: 1.5 (ref 0.9–1.1)

## 2025-08-01 PROCEDURE — 36416 COLLJ CAPILLARY BLOOD SPEC: CPT | Performed by: INTERNAL MEDICINE

## 2025-08-01 PROCEDURE — 85610 PROTHROMBIN TIME: CPT | Performed by: INTERNAL MEDICINE

## 2025-08-01 PROCEDURE — 93793 ANTICOAG MGMT PT WARFARIN: CPT | Performed by: INTERNAL MEDICINE

## 2025-08-08 ENCOUNTER — ANTICOAGULATION VISIT (OUTPATIENT)
Dept: CARDIOLOGY | Facility: CLINIC | Age: OVER 89
End: 2025-08-08
Payer: MEDICARE

## 2025-08-08 DIAGNOSIS — I48.0 PAROXYSMAL ATRIAL FIBRILLATION: Primary | ICD-10-CM

## 2025-08-08 LAB — INR PPP: 1.1 (ref 0.9–1.1)

## 2025-08-08 PROCEDURE — 85610 PROTHROMBIN TIME: CPT | Performed by: INTERNAL MEDICINE

## 2025-08-08 PROCEDURE — 36416 COLLJ CAPILLARY BLOOD SPEC: CPT | Performed by: INTERNAL MEDICINE

## 2025-08-08 PROCEDURE — 93793 ANTICOAG MGMT PT WARFARIN: CPT | Performed by: INTERNAL MEDICINE

## 2025-08-08 RX ORDER — WARFARIN SODIUM 5 MG/1
TABLET ORAL
Qty: 100 TABLET | Refills: 1 | Status: SHIPPED | OUTPATIENT
Start: 2025-08-08

## 2025-08-22 ENCOUNTER — ANTICOAGULATION VISIT (OUTPATIENT)
Dept: CARDIOLOGY | Facility: CLINIC | Age: OVER 89
End: 2025-08-22
Payer: MEDICARE

## 2025-08-22 DIAGNOSIS — I48.0 PAROXYSMAL ATRIAL FIBRILLATION: Primary | ICD-10-CM

## 2025-08-22 LAB — INR PPP: 1.7 (ref 0.9–1.1)

## 2025-08-22 PROCEDURE — 93793 ANTICOAG MGMT PT WARFARIN: CPT | Performed by: INTERNAL MEDICINE

## 2025-08-22 PROCEDURE — 36416 COLLJ CAPILLARY BLOOD SPEC: CPT | Performed by: INTERNAL MEDICINE

## 2025-08-22 PROCEDURE — 85610 PROTHROMBIN TIME: CPT | Performed by: INTERNAL MEDICINE

## 2025-08-29 ENCOUNTER — OFFICE VISIT (OUTPATIENT)
Dept: CARDIOLOGY | Facility: CLINIC | Age: OVER 89
End: 2025-08-29
Payer: MEDICARE

## 2025-08-29 VITALS
BODY MASS INDEX: 17.88 KG/M2 | HEIGHT: 72 IN | SYSTOLIC BLOOD PRESSURE: 118 MMHG | OXYGEN SATURATION: 98 % | HEART RATE: 71 BPM | DIASTOLIC BLOOD PRESSURE: 62 MMHG | WEIGHT: 132 LBS

## 2025-08-29 DIAGNOSIS — Z95.0 PRESENCE OF CARDIAC PACEMAKER: ICD-10-CM

## 2025-08-29 DIAGNOSIS — I25.10 CORONARY ARTERY DISEASE INVOLVING NATIVE CORONARY ARTERY OF NATIVE HEART WITHOUT ANGINA PECTORIS: ICD-10-CM

## 2025-08-29 DIAGNOSIS — E78.2 MIXED HYPERLIPIDEMIA: ICD-10-CM

## 2025-08-29 DIAGNOSIS — I48.0 PAROXYSMAL ATRIAL FIBRILLATION: Primary | ICD-10-CM

## 2025-08-29 DIAGNOSIS — I49.5 SICK SINUS SYNDROME: ICD-10-CM

## 2025-08-29 PROCEDURE — 99214 OFFICE O/P EST MOD 30 MIN: CPT | Performed by: INTERNAL MEDICINE

## 2025-08-29 PROCEDURE — 1159F MED LIST DOCD IN RCRD: CPT | Performed by: INTERNAL MEDICINE

## 2025-08-29 PROCEDURE — 1160F RVW MEDS BY RX/DR IN RCRD: CPT | Performed by: INTERNAL MEDICINE

## (undated) DEVICE — IRRIGATOR BULB ASEPTO 60CC STRL

## (undated) DEVICE — GLV SURG SENSICARE PI MIC PF SZ7.5 LF STRL

## (undated) DEVICE — PAD E/S GRND SGL/FOIL 9FT/CORD DISP

## (undated) DEVICE — ADHS SKIN PREMIERPRO EXOFIN TOPICAL HI/VISC .5ML

## (undated) DEVICE — REFLEX ONE, SKIN STAPLER, 35 WIDE: Brand: REFLEX

## (undated) DEVICE — DRSNG SLVR/ANTIBAC PRIMASEAL POST/OP ADHS 3.5X10IN

## (undated) DEVICE — ZIPPERED TOGA, 2X LARGE: Brand: FLYTE

## (undated) DEVICE — ELECTRD DEFIB M/FUNC PROPADZ RADIOL 2PK

## (undated) DEVICE — 3M™ STERI-STRIP™ BLEND TONE SKIN CLOSURES, B1557, TAN, 1/2 IN X 4 IN (12MM X 100MM), 6 STRIPS/ENVELOPE: Brand: 3M™ STERI-STRIP™

## (undated) DEVICE — WRAP COMPR HIP COLD THERP

## (undated) DEVICE — ADHS LIQ MASTISOL 2/3ML

## (undated) DEVICE — SOL IRR NACL 0.9PCT 3000ML

## (undated) DEVICE — KT PT POSITION SUPINE HANA/PROFX TABL

## (undated) DEVICE — SUT MNCRYL 2/0 CT1 36IN

## (undated) DEVICE — GLV SURG SENSICARE PI ORTHO SZ8 LF STRL

## (undated) DEVICE — GLV SURG SIGNATURE ESSENTIAL PF LTX SZ8.5

## (undated) DEVICE — INTEGUSEAL MICROBIAL SEALANT: Brand: AVANOS

## (undated) DEVICE — BIPOLAR SEALER 23-112-1 AQM 6.0: Brand: AQUAMANTYS™

## (undated) DEVICE — BLANKT WARM UNDER/BDY FUL/ACC A/ 90X206CM

## (undated) DEVICE — GLV SURG SENSICARE PI LF PF 8 GRN STRL

## (undated) DEVICE — DRAPE,INSTRUMENT,MAGNETIC,10X16: Brand: MEDLINE

## (undated) DEVICE — SYR LUERLOK 30CC

## (undated) DEVICE — DUAL CUT SAGITTAL BLADE

## (undated) DEVICE — SUT VIC 2/0 CT2 27IN J269H

## (undated) DEVICE — SUT VIC 1 CT1 36IN J947H

## (undated) DEVICE — C-ARM: Brand: UNBRANDED

## (undated) DEVICE — PLASMABLADE PS200-040 4.0: Brand: PLASMABLADE™

## (undated) DEVICE — DRAPE,U/ SHT,SPLIT,PLAS,STERIL: Brand: MEDLINE

## (undated) DEVICE — PK TOTL HIP 50

## (undated) DEVICE — DRSNG SURESITE WNDW 4X4.5

## (undated) DEVICE — SOLUTION,WATER,IRRIGATION,1000ML,STERILE: Brand: MEDLINE

## (undated) DEVICE — PACEMAKER CDS: Brand: MEDLINE INDUSTRIES, INC.

## (undated) DEVICE — SOL IRR NACL 0.9PCT ARTHROMATIC 3000ML

## (undated) DEVICE — KT SURG TURNOVER 050

## (undated) DEVICE — NEEDLE, QUINCKE, 18GX3.5": Brand: MEDLINE

## (undated) DEVICE — GOWN,REINFORCE,POLY,SIRUS,BREATH SLV,LG: Brand: MEDLINE